# Patient Record
Sex: FEMALE | Race: WHITE | Employment: PART TIME | URBAN - METROPOLITAN AREA
[De-identification: names, ages, dates, MRNs, and addresses within clinical notes are randomized per-mention and may not be internally consistent; named-entity substitution may affect disease eponyms.]

---

## 2017-01-10 ENCOUNTER — HOSPITAL ENCOUNTER (OUTPATIENT)
Dept: CT IMAGING | Age: 32
Discharge: HOME | End: 2017-01-10
Attending: OBSTETRICS & GYNECOLOGY
Payer: COMMERCIAL

## 2017-01-10 DIAGNOSIS — R10.2 PELVIC PAIN: ICD-10-CM

## 2017-01-10 PROCEDURE — 74011000258 HC RX REV CODE- 258: Performed by: OBSTETRICS & GYNECOLOGY

## 2017-01-10 PROCEDURE — 74011636320 HC RX REV CODE- 636/320: Performed by: OBSTETRICS & GYNECOLOGY

## 2017-01-10 PROCEDURE — 74177 CT ABD & PELVIS W/CONTRAST: CPT

## 2017-01-10 RX ORDER — SODIUM CHLORIDE 0.9 % (FLUSH) 0.9 %
10 SYRINGE (ML) INJECTION
Status: COMPLETED | OUTPATIENT
Start: 2017-01-10 | End: 2017-01-10

## 2017-01-10 RX ADMIN — IOVERSOL 100 ML: 741 INJECTION INTRA-ARTERIAL; INTRAVENOUS at 10:05

## 2017-01-10 RX ADMIN — SODIUM CHLORIDE 100 ML: 900 INJECTION, SOLUTION INTRAVENOUS at 10:05

## 2017-01-10 RX ADMIN — Medication 10 ML: at 10:05

## 2017-01-10 RX ADMIN — DIATRIZOATE MEGLUMINE AND DIATRIZOATE SODIUM 15 ML: 660; 100 LIQUID ORAL; RECTAL at 10:05

## 2017-05-23 ENCOUNTER — HOSPITAL ENCOUNTER (OUTPATIENT)
Dept: LAB | Age: 32
Discharge: HOME | End: 2017-05-23
Attending: PHYSICAL MEDICINE & REHABILITATION
Payer: COMMERCIAL

## 2017-05-23 LAB
CRP SERPL-MCNC: <0.2 MG/DL (ref 0–0.9)
ERYTHROCYTE [SEDIMENTATION RATE] IN BLOOD: 6 MM/HR (ref 0–20)
URATE SERPL-MCNC: 3.5 MG/DL (ref 2.6–6)

## 2017-05-23 PROCEDURE — 86140 C-REACTIVE PROTEIN: CPT

## 2017-05-23 PROCEDURE — 84550 ASSAY OF BLOOD/URIC ACID: CPT

## 2017-05-23 PROCEDURE — 86060 ANTISTREPTOLYSIN O TITER: CPT

## 2017-05-23 PROCEDURE — 36415 COLL VENOUS BLD VENIPUNCTURE: CPT

## 2017-05-23 PROCEDURE — 85652 RBC SED RATE AUTOMATED: CPT

## 2017-05-23 PROCEDURE — 82306 VITAMIN D 25 HYDROXY: CPT

## 2017-05-23 PROCEDURE — 86038 ANTINUCLEAR ANTIBODIES: CPT

## 2017-05-23 PROCEDURE — 86430 RHEUMATOID FACTOR TEST QUAL: CPT

## 2017-05-23 PROCEDURE — 82652 VIT D 1 25-DIHYDROXY: CPT

## 2017-05-24 LAB
1,25(OH)2D3 SERPL-MCNC: 84.8 PG/ML (ref 19.9–79.3)
25(OH)D3+25(OH)D2 SERPL-MCNC: 36.5 NG/ML (ref 30–100)
ANA SER QL: NEGATIVE
ASO AB SERPL-ACNC: 84 IU/ML (ref 0–200)
RHEUMATOID FACT SER QL LA: NEGATIVE

## 2017-05-27 LAB
25(OH)D2 SERPL-MCNC: 3.2 NG/ML
25(OH)D3 SERPL-MCNC: 35 NG/ML
25(OH)D3+25(OH)D2 SERPL-MCNC: 38 NG/ML

## 2017-11-07 ENCOUNTER — HOSPITAL ENCOUNTER (OUTPATIENT)
Dept: PHYSICAL THERAPY | Age: 32
Discharge: HOME | End: 2017-11-07
Payer: COMMERCIAL

## 2017-11-07 PROCEDURE — 97140 MANUAL THERAPY 1/> REGIONS: CPT

## 2017-11-07 PROCEDURE — 97162 PT EVAL MOD COMPLEX 30 MIN: CPT

## 2017-11-07 NOTE — PROGRESS NOTES
Ambulatory/Rehab Services H2 Model Falls Risk Assessment    Risk Factor Pts. ·   Confusion/Disorientation/Impulsivity  []    4 ·   Symptomatic Depression  []   2 ·   Altered Elimination  []   1 ·   Dizziness/Vertigo  []   1 ·   Gender (Male)  []   1 ·   Any administered antiepileptics (anticonvulsants):  []   2 ·   Any administered benzodiazepines:  []   1 ·   Visual Impairment (specify):  []   1 ·   Portable Oxygen Use  []   1 ·   Orthostatic ? BP  []   1 ·   History of Recent Falls (within 3 mos.)  []   5     Ability to Rise from Chair (choose one) Pts. ·   Ability to rise in a single movement  [x]   0 ·   Pushes up, successful in one attempt  []   1 ·   Multiple attempts, but successful  []   3 ·   Unable to rise without assistance  []   4   Total: (5 or greater = High Risk) 0     Falls Prevention Plan:   []                Physical Limitations to Exercise (specify):   []                Mobility Assistance Device (type):   []                Exercise/Equipment Adaptation (specify):    ©2010 [facility] Patent #[id number].  Federal Law prohibits the replication, distribution or use without written permission from [facility] [employer]

## 2017-11-07 NOTE — THERAPY EVALUATION
Aisha Joiner Bob  : 1985  Payor: Shannen Hwang / [place] / Product Type: PPO /  [address]  [address]  [phone]   [phone]          OUTPATIENT PHYSICAL THERAPY:Initial Assessment and Daily Note 2017    ICD-10: Treatment Diagnosis: Chronic pelvic pain (R10.2); spasm of muscle (M62.838); Pelvic floor dysfunction (M62.89)  Precautions/Allergies:   Codeine and Dermagraft [cult skin subst, human-bovine]   Fall Risk Score: 0 (? 5 = High Risk)  MD Orders: Evaluate and treat MEDICAL/REFERRING DIAGNOSIS:  Pelvic floor dysfunction [M62.89]   DATE OF ONSET: Chronic endometriosis since menstruation  REFERRING PHYSICIAN: Jeannette Covarrubias, *  RETURN PHYSICIAN APPOINTMENT: TBD     INITIAL ASSESSMENT:  Ms. Henok Johnson presents with significant pelvic floor muscle (PFM) over activity and lack of coordination, as well as connective tissue restrictions and trigger points in her adductors, abdominal wall and posterior chain; this results in chronic pelvic pain. She also demonstrates weakness throughout her core musculature and pelvic girdle. This is contributing to her difficulty with all ADL's and physical activity. I believe she will benefit from skilled PT, with an emphasis on manual therapy and muscle retraining, strength, and coordination to improve her ability to perform housework, , work responsibilities and physical activity without pain or difficulty. She is pleasant, motivated and eager to return to her prior level of function. Progress may be imited, however, due to the chronic and severe nature of her pain. PROBLEM LIST (Impacting functional limitations):  1. Decreased Strength  2. Decreased ADL/Functional Activities  3. Increased Pain  4. Decreased Activity Tolerance INTERVENTIONS PLANNED:  1. Electrical Stimulation  2. Home Exercise Program (HEP)  3. Therapeutic Activites  4.  Therapeutic Exercise/Strengthening   TREATMENT PLAN:  Effective Dates: 11/7/2017 TO 1/30/2018. Frequency/Duration: Once every two weeks for 12 weeks. GOALS: (Goals have been discussed and agreed upon with patient.)  Short-Term Functional Goals: Time Frame: 6 weeks  1. Pt will demonstrate N voluntary relaxation of PFM to improve her ability to urinate without hesitancy or incomplete emptying. 2. Pt will demonstrate I with basic PFM HEP to improve resting tone and decrease pain with prolonged sitting. Discharge Goals: Time Frame: 12 weeks  1. Pt will reports < 2/10 pain with 1 finger palpation of PFM and obturator internus (OI); to initiate pain free intercourse with spouse. 2. Pt will demonstrate appropriate co contraction of the Transversus Abdominus and Pelvic Floor muscle group (without excessive compensation), and maintain x 60 seconds to improve core stability and return to dynamic physical activity such as running and jumping. 3. Pt will tolerate dilator training with < 3/10 pain to initiate at home and improve tenderness in PFM to return to pain free sexual activity with spouse. Rehabilitation Potential For Stated Goals: Good                The information in this section was collected on 11/7/2017 (except where otherwise noted). HISTORY:   History of Present Injury/Illness (Reason for Referral):  Pt with a history of chronic endometriosis. This has resulted in > 10 surgeries to remove, including hysterectomy (has ovaries) and excision in November 2016. Pain comes and goes, but is always present. Recent worsening of pain in beginning of November. Urinary: Complains of hesitancy and incomplete emptying. Takes ~ 15-30 seconds to start stream. Denies UI or dysuria at this time,  Bowel: History of constipation managed with OTC medications. However, recently has been going daily with push/strain. Sexual: Sex has always been painful.  Currently, pain during is 6/10 (diffuse in the vagina) and 8/10 after (throughout the pelvic girdle). 10/10 if he \"hits my cuff\". Pelvic Organ Prolapse/Pelvic Pain: Daily pelvic pain reported in L LQ. Sharp, shooting down anterior thigh on L. Can radiate up to ribs. Best pain is 6/19o while on pain medication, worst 10/10. Nothing seems to make pain better other than meds, prolonged sitting or standing, as well as ADL's increase pain. Past Medical History/Comorbidities:   Ms. Aakash Washington  has a past medical history of Anxiety (9/6/2013); Endometriosis; Gluten intolerance; Migraine; Nausea & vomiting; Ovarian cyst; PUD (peptic ulcer disease) (12/2013 ); Unspecified adverse effect of anesthesia; UTI (urinary tract infection); and Vaginal cuff dehiscence. Ms. Aakash Washington  has a past surgical history that includes breast augmentation (2003); gyn (2000, 2004); hysteroscopy; dilation and curettage; endometrial cryoablation; pelvic laparoscopy (11/03/2016); other surgical; bilateral salpingectomy; other surgical (11/2016); and total laparoscopic hysterectomy. Social History/Living Environment:     Pt lives in [address] [place] [address] home with spouse and 3 children  Prior Level of Function/Work/Activity:  Works part time at [employer]    Current Medications:       Current Outpatient Prescriptions:     carisoprodol (SOMA) 250 mg tablet, Take 250 mg by mouth four (4) times daily. , Disp: , Rfl:     minocycline (MINOCIN, DYNACIN) 100 mg capsule, Take  by mouth., Disp: , Rfl:     traMADol (ULTRAM) 50 mg tablet, Take 1-2 Tabs by mouth every six (6) hours as needed for Pain. Max Daily Amount: 400 mg., Disp: 60 Tab, Rfl: 0    clonazePAM (KLONOPIN) 0.5 mg tablet, Take  by mouth nightly as needed. , Disp: , Rfl:     HYDROcodone-acetaminophen (NORCO) 7.5-325 mg per tablet, Take 1 Tab by mouth every six (6) hours as needed for Pain. Max Daily Amount: 4 Tabs., Disp: 60 Tab, Rfl: 0    amitriptyline (ELAVIL) 25 mg tablet, Take 1 Tab by mouth nightly.  Indications: NEUROPATHIC PAIN, Disp: 30 Tab, Rfl: 11    gabapentin (NEURONTIN) 100 mg capsule, TAKE THREE CAPSULES BY MOUTH EVERY DAY, Disp: 90 Cap, Rfl: 6    citalopram (CELEXA) 10 mg tablet, Take 20 mg by mouth daily. , Disp: , Rfl:     cyclobenzaprine (FLEXERIL) 5 mg tablet, Take 5 mg by mouth as needed for Muscle Spasm(s). , Disp: , Rfl:     promethazine (PHENERGAN) 25 mg tablet, Take 25 mg by mouth every six (6) hours as needed for Nausea., Disp: , Rfl:     docusate sodium (COLACE) 50 mg capsule, Take 50 mg by mouth three (3) times daily. , Disp: , Rfl:     ibuprofen (MOTRIN) 800 mg tablet, Take 1 Tab by mouth every six (6) hours as needed. , Disp: 30 Tab, Rfl: 0    magnesium oxide (MAG-OX) 400 mg tablet, Take 400 mg by mouth daily. , Disp: , Rfl:     melatonin 3 mg tablet, Take 5 mg by mouth nightly., Disp: , Rfl:     loratadine (CLARITIN) 10 mg tablet, Take 10 mg by mouth every evening., Disp: , Rfl:     LACTOBACILLUS ACIDOPHILUS (PROBIOTIC PO), Take  by mouth., Disp: , Rfl:     multivitamin (ONE A DAY) tablet, Take 1 Tab by mouth daily. , Disp: , Rfl:    Date Last Reviewed:  11/7/2017     Number of Personal Factors/Comorbidities that affect the Plan of Care: 1-2: MODERATE COMPLEXITY   EXAMINATION:   Palpation:          Adductors: Moderate connective tissue (CT) restrictions B, L>R with pain and wincing. Pudendal nerve glides significantly tender to palpation (TTP). Thoracic Spine: T10-12 refers pain towards vaginal cuff, however mobility is WNL. ROM:          Pelvic mobility: WNL and painfree. Lumbar Mobility: WNL, and painfree. Strength: Of PFM via SEMG: Resting tone generally 10-15 mV. Superficial PFM via vaginal canal: Power at least 3/5 with moderate delay in relaxation. Transverse Abdominus (TA): Good with strong PFM co contraction       Body Structures Involved:  1. Muscles Body Functions Affected:  1. Reproductive  2. Neuromusculoskeletal  3. Movement Related Activities and Participation Affected:  1.  General Tasks and Demands  2. Mobility  3. Self Care  4. Interpersonal Interactions and Relationships   Number of elements (examined above) that affect the Plan of Care: 3: MODERATE COMPLEXITY   CLINICAL PRESENTATION:   Presentation: Evolving clinical presentation with changing clinical characteristics: MODERATE COMPLEXITY   CLINICAL DECISION MAKING:   Outcome Measure: Tool Used: Pain Disability Index  Score:  Initial: 11/7/2017; 42 Most Recent: TBD   Interpretation of Score: The rating scale measures the degree to which aspects of your life are disrupted by chronic pain. For each of the 7 categories, patient circles the level of disability they experience 0 to 10. 0 signifies no disability at all, 10 signifies that these activities are totally disrupted by pain. Medical Necessity:   · Patient is expected to demonstrate progress in range of motion, coordination and functional technique to return to painfree ADL's, work activities and recreation. Reason for Services/Other Comments:  · Patient continues to require skilled intervention due to above mentioned deficits. Use of outcome tool(s) and clinical judgement create a POC that gives a: Questionable prediction of patient's progress: MODERATE COMPLEXITY            TREATMENT:   (In addition to Assessment/Re-Assessment sessions the following treatments were rendered)  Pre-treatment Symptoms/Complaints: This is the worst flare she has been in in years, and the pain feels different. Has an MRI on the 13th. Pain: Initial: Pain Intensity 1: 6/10 Post Session:  6/10     THERAPEUTIC EXERCISE: (5 minutes):  Exercises per grid below to improve mobility and coordination. Required moderate verbal and tactile cues to promote proper performance of exercise. Progressed repetitions and complexity of movement as indicated.    Date:  11/7/2017 Date:   Date:     Activity/Exercise Parameters Parameters Parameters   Pelvic Floor Drops 2 minutes     Dilator Education 3 minutes         MANUAL THERAPY: (20 minutes): Joint mobilization and Soft tissue mobilization was utilized and necessary because of the patient's painful spasm. (Used abbreviations: MET - muscle energy technique; SCS- Strain counter strain; CTM- Connective tissue mobilizations; CR- Contract/relax; SP- Sustained pressure). SP utilized to superficial PFM with 1 finger and XS dilator, followed by CTM to B adductors, pudendal nerve glides B, and thoracolumbar PA's and UPA's. Ended session with CTM to thoracolumbar paraspinals and coccyx taping. Trigger point dry needling performed to B adductor sami/longus. Treatment/Session Assessment:    · Response to Treatment:  No abdominal assessment this date due to significant L LQ pain. Initiated down training HEP and MT to pelvic girdle. · Compliance with Program/Exercises: Will assess as treatment progresses. · Recommendations/Intent for next treatment session: \"Next visit will focus on semg, internal, adductors and review HEP\".   Total Treatment Duration: Evaluation: 45 minutes, Treatment 25 minutes  PT Patient Time In/Time Out  Time In: 0900  Time Out: Aydin Higuera 38., PT

## 2017-11-13 ENCOUNTER — HOSPITAL ENCOUNTER (OUTPATIENT)
Dept: MRI IMAGING | Age: 32
Discharge: HOME | End: 2017-11-13
Attending: OBSTETRICS & GYNECOLOGY
Payer: COMMERCIAL

## 2017-11-13 DIAGNOSIS — R10.32 LLQ PAIN: ICD-10-CM

## 2017-11-13 PROCEDURE — A9577 INJ MULTIHANCE: HCPCS | Performed by: OBSTETRICS & GYNECOLOGY

## 2017-11-13 PROCEDURE — 74011250636 HC RX REV CODE- 250/636: Performed by: OBSTETRICS & GYNECOLOGY

## 2017-11-13 PROCEDURE — 72197 MRI PELVIS W/O & W/DYE: CPT

## 2017-11-13 RX ORDER — SODIUM CHLORIDE 0.9 % (FLUSH) 0.9 %
10 SYRINGE (ML) INJECTION
Status: COMPLETED | OUTPATIENT
Start: 2017-11-13 | End: 2017-11-13

## 2017-11-13 RX ADMIN — Medication 10 ML: at 16:20

## 2017-11-13 RX ADMIN — GADOBENATE DIMEGLUMINE 10 ML: 529 INJECTION, SOLUTION INTRAVENOUS at 16:20

## 2017-11-14 NOTE — PROGRESS NOTES
Please notify the patient of normal MRI. Between USG and MRI I don't find anything to explain the pain. Follow up as indicated.   Thanks

## 2017-11-21 ENCOUNTER — HOSPITAL ENCOUNTER (OUTPATIENT)
Dept: PHYSICAL THERAPY | Age: 32
Discharge: HOME | End: 2017-11-21
Payer: COMMERCIAL

## 2017-11-21 DIAGNOSIS — R10.2 PELVIC PAIN: ICD-10-CM

## 2017-11-21 PROCEDURE — 97140 MANUAL THERAPY 1/> REGIONS: CPT

## 2017-11-21 PROCEDURE — 97110 THERAPEUTIC EXERCISES: CPT

## 2017-12-08 ENCOUNTER — HOSPITAL ENCOUNTER (OUTPATIENT)
Dept: PHYSICAL THERAPY | Age: 32
Discharge: HOME | End: 2017-12-08
Payer: COMMERCIAL

## 2017-12-08 PROCEDURE — 97110 THERAPEUTIC EXERCISES: CPT

## 2017-12-08 PROCEDURE — 97140 MANUAL THERAPY 1/> REGIONS: CPT

## 2017-12-08 NOTE — PROGRESS NOTES
Giorgio Rojas  : 1985  Payor: Yoli Singh / [place] / Product Type: PPO /  [address]  at [address]  [phone]   [phone]          OUTPATIENT PHYSICAL THERAPY:Daily Note 2017    ICD-10: Treatment Diagnosis: Chronic pelvic pain (R10.2); spasm of muscle (M62.838); Pelvic floor dysfunction (M62.89)  Precautions/Allergies:   Codeine and Dermagraft [cult skin subst, human-bovine]   Fall Risk Score: 0 (? 5 = High Risk)  MD Orders: Evaluate and treat MEDICAL/REFERRING DIAGNOSIS:  Pelvic floor dysfunction [M62.89]   DATE OF ONSET: Chronic endometriosis since menstruation  REFERRING PHYSICIAN: Radha Contreras, *  RETURN PHYSICIAN APPOINTMENT: TBD     INITIAL ASSESSMENT:  Ms. Mathieu Díaz presents with significant pelvic floor muscle (PFM) over activity and lack of coordination, as well as connective tissue restrictions and trigger points in her adductors, abdominal wall and posterior chain; this results in chronic pelvic pain. She also demonstrates weakness throughout her core musculature and pelvic girdle. This is contributing to her difficulty with all ADL's and physical activity. I believe she will benefit from skilled PT, with an emphasis on manual therapy and muscle retraining, strength, and coordination to improve her ability to perform housework, , work responsibilities and physical activity without pain or difficulty. She is pleasant, motivated and eager to return to her prior level of function. Progress may be imited, however, due to the chronic and severe nature of her pain. PROBLEM LIST (Impacting functional limitations):  1. Decreased Strength  2. Decreased ADL/Functional Activities  3. Increased Pain  4. Decreased Activity Tolerance INTERVENTIONS PLANNED:  1. Electrical Stimulation  2. Home Exercise Program (HEP)  3. Therapeutic Activites  4.  Therapeutic Exercise/Strengthening TREATMENT PLAN:  Effective Dates: 11/7/2017 TO 1/30/2018. Frequency/Duration: Once every two weeks for 12 weeks. GOALS: (Goals have been discussed and agreed upon with patient.)  Short-Term Functional Goals: Time Frame: 6 weeks  1. Pt will demonstrate N voluntary relaxation of PFM to improve her ability to urinate without hesitancy or incomplete emptying. 2. Pt will demonstrate I with basic PFM HEP to improve resting tone and decrease pain with prolonged sitting. Discharge Goals: Time Frame: 12 weeks  1. Pt will reports < 2/10 pain with 1 finger palpation of PFM and obturator internus (OI); to initiate pain free intercourse with spouse. 2. Pt will demonstrate appropriate co contraction of the Transversus Abdominus and Pelvic Floor muscle group (without excessive compensation), and maintain x 60 seconds to improve core stability and return to dynamic physical activity such as running and jumping. 3. Pt will tolerate dilator training with < 3/10 pain to initiate at home and improve tenderness in PFM to return to pain free sexual activity with spouse. Rehabilitation Potential For Stated Goals: Good                The information in this section was collected on 11/7/2017 (except where otherwise noted). HISTORY:   History of Present Injury/Illness (Reason for Referral):  Pt with a history of chronic endometriosis. This has resulted in > 10 surgeries to remove, including hysterectomy (has ovaries) and excision in November 2016. Pain comes and goes, but is always present. Recent worsening of pain in beginning of November. Urinary: Complains of hesitancy and incomplete emptying. Takes ~ 15-30 seconds to start stream. Denies UI or dysuria at this time,  Bowel: History of constipation managed with OTC medications. However, recently has been going daily with push/strain. Sexual: Sex has always been painful. Currently, pain during is 6/10 (diffuse in the vagina) and 8/10 after (throughout the pelvic girdle).  10/10 if he \"hits my cuff\". Pelvic Organ Prolapse/Pelvic Pain: Daily pelvic pain reported in L LQ. Sharp, shooting down anterior thigh on L. Can radiate up to ribs. Best pain is 6/19o while on pain medication, worst 10/10. Nothing seems to make pain better other than meds, prolonged sitting or standing, as well as ADL's increase pain. Past Medical History/Comorbidities:   Ms. Sedrick Batista  has a past medical history of Anxiety (9/6/2013); Endometriosis; Gluten intolerance; Migraine; Nausea & vomiting; Ovarian cyst; PUD (peptic ulcer disease) (12/2013 ); Unspecified adverse effect of anesthesia; UTI (urinary tract infection); and Vaginal cuff dehiscence. Ms. Sedrick Batista  has a past surgical history that includes breast augmentation (2003); gyn (2000, 2004); hysteroscopy; dilation and curettage; endometrial cryoablation; pelvic laparoscopy (11/03/2016); other surgical; bilateral salpingectomy; other surgical (11/2016); and total laparoscopic hysterectomy. Social History/Living Environment:     Pt lives in [address] [place] home with spouse and 3 children  Prior Level of Function/Work/Activity:  Works part time at [employer]    Current Medications:       Current Outpatient Prescriptions:     carisoprodol (SOMA) 250 mg tablet, Take 250 mg by mouth four (4) times daily. , Disp: , Rfl:     minocycline (MINOCIN, DYNACIN) 100 mg capsule, Take  by mouth., Disp: , Rfl:     traMADol (ULTRAM) 50 mg tablet, Take 1-2 Tabs by mouth every six (6) hours as needed for Pain. Max Daily Amount: 400 mg., Disp: 60 Tab, Rfl: 0    clonazePAM (KLONOPIN) 0.5 mg tablet, Take  by mouth nightly as needed. , Disp: , Rfl:     HYDROcodone-acetaminophen (NORCO) 7.5-325 mg per tablet, Take 1 Tab by mouth every six (6) hours as needed for Pain. Max Daily Amount: 4 Tabs., Disp: 60 Tab, Rfl: 0    amitriptyline (ELAVIL) 25 mg tablet, Take 1 Tab by mouth nightly.  Indications: NEUROPATHIC PAIN, Disp: 30 Tab, Rfl: 11    gabapentin (NEURONTIN) 100 mg capsule, TAKE THREE CAPSULES BY MOUTH EVERY DAY, Disp: 90 Cap, Rfl: 6    citalopram (CELEXA) 10 mg tablet, Take 20 mg by mouth daily. , Disp: , Rfl:     cyclobenzaprine (FLEXERIL) 5 mg tablet, Take 5 mg by mouth as needed for Muscle Spasm(s). , Disp: , Rfl:     promethazine (PHENERGAN) 25 mg tablet, Take 25 mg by mouth every six (6) hours as needed for Nausea., Disp: , Rfl:     docusate sodium (COLACE) 50 mg capsule, Take 50 mg by mouth three (3) times daily. , Disp: , Rfl:     ibuprofen (MOTRIN) 800 mg tablet, Take 1 Tab by mouth every six (6) hours as needed. , Disp: 30 Tab, Rfl: 0    magnesium oxide (MAG-OX) 400 mg tablet, Take 400 mg by mouth daily. , Disp: , Rfl:     melatonin 3 mg tablet, Take 5 mg by mouth nightly., Disp: , Rfl:     loratadine (CLARITIN) 10 mg tablet, Take 10 mg by mouth every evening., Disp: , Rfl:     LACTOBACILLUS ACIDOPHILUS (PROBIOTIC PO), Take  by mouth., Disp: , Rfl:     multivitamin (ONE A DAY) tablet, Take 1 Tab by mouth daily. , Disp: , Rfl:    Date Last Reviewed:  12/8/2017     Number of Personal Factors/Comorbidities that affect the Plan of Care: 1-2: MODERATE COMPLEXITY   EXAMINATION:   Palpation:          Adductors: Moderate connective tissue (CT) restrictions B, L>R with pain and wincing. Pudendal nerve glides significantly tender to palpation (TTP). Thoracic Spine: T10-12 refers pain towards vaginal cuff, however mobility is WNL. ROM:          Pelvic mobility: WNL and painfree. Lumbar Mobility: WNL, and painfree. Strength: Of PFM via SEMG: Resting tone generally 10-15 mV. Superficial PFM via vaginal canal: Power at least 3/5 with moderate delay in relaxation. Transverse Abdominus (TA): Good with strong PFM co contraction       Body Structures Involved:  1. Muscles Body Functions Affected:  1. Reproductive  2. Neuromusculoskeletal  3. Movement Related Activities and Participation Affected:  1. General Tasks and Demands  2. Mobility  3.  Self Care  4. Interpersonal Interactions and Relationships   Number of elements (examined above) that affect the Plan of Care: 3: MODERATE COMPLEXITY   CLINICAL PRESENTATION:   Presentation: Evolving clinical presentation with changing clinical characteristics: MODERATE COMPLEXITY   CLINICAL DECISION MAKING:   Outcome Measure: Tool Used: Pain Disability Index  Score:  Initial: 11/7/2017; 42 Most Recent: TBD   Interpretation of Score: The rating scale measures the degree to which aspects of your life are disrupted by chronic pain. For each of the 7 categories, patient circles the level of disability they experience 0 to 10. 0 signifies no disability at all, 10 signifies that these activities are totally disrupted by pain. Medical Necessity:   · Patient is expected to demonstrate progress in range of motion, coordination and functional technique to return to painfree ADL's, work activities and recreation. Reason for Services/Other Comments:  · Patient continues to require skilled intervention due to above mentioned deficits. Use of outcome tool(s) and clinical judgement create a POC that gives a: Questionable prediction of patient's progress: MODERATE COMPLEXITY            TREATMENT:   (In addition to Assessment/Re-Assessment sessions the following treatments were rendered)    Pre-treatment Symptoms/Complaints:  Currently, feels like pelvic pain is about the same. Traveled to the [facility] for 10 days, with increase in pain with prolonged travel (as well as return of tailbone pain). Did have intercourse with spouse when she returned, and again very painful, requiring medication. Currently, pain is primarily in the L LQ and can radiate down into the thigh. Pain: Initial: Pain Intensity 1: 6/10 Post Session:  5/10 (but very sore)     THERAPEUTIC EXERCISE: (10 minutes):  Exercises per grid below to improve mobility and coordination.   Required moderate verbal and tactile cues to promote proper performance of exercise. Progressed repetitions and complexity of movement as indicated. Date:  12/8/2017     Activity/Exercise Parameters   Pelvic Floor Drops Supine; 5 minutes   Adductor stretch Performed B. Supine frog and DKTC; 4 minutes   Home Exercise Program Drops, DKTC/Adductor Stretch, Pelvic Mobility, Dilators; 1 minutes       MANUAL THERAPY: (50 minutes): Joint mobilization and Soft tissue mobilization was utilized and necessary because of the patient's painful spasm. (Used abbreviations: MET - muscle energy technique; SCS- Strain counter strain; CTM- Connective tissue mobilizations; CR- Contract/relax; SP- Sustained pressure). - SP utilized to superficial and deep PFM, as well as OI with 1 finger, followed by CTM and IASTM to B adductors, pudendal nerve glides B, gentle iliacus release, and external PFM release. Ended session with trigger point dry needling performed to B adductor sami/longus and iliac clearing B. Treatment/Session Assessment:    · Response to Treatment:  Continues to report significant pain with palpation of PFM, unable to access OI due to pain today. As well as pain, with complaints of nausea, with gentle iliac clearing. She left PT with soreness and continued pain, but generally states that relief comes the following day. Instructed to continue down training and attempt to use dilators this week to improve PFM ROM and decrease daily pain. · Compliance with Program/Exercises: Compliant with downtraining at this time. · Recommendations/Intent for next treatment session: \"Next visit will focus on semg, internal, ischiorectal fossa, thoracic spine mobility\".     Total Treatment Duration: 60 minutes; 5 min regla/doff clothing  PT Patient Time In/Time Out  Time In: 1230  Time Out: 1335    Erna Cunha PT

## 2017-12-19 ENCOUNTER — HOSPITAL ENCOUNTER (OUTPATIENT)
Dept: PHYSICAL THERAPY | Age: 32
Discharge: HOME | End: 2017-12-19
Payer: COMMERCIAL

## 2017-12-19 PROCEDURE — 97110 THERAPEUTIC EXERCISES: CPT

## 2017-12-19 PROCEDURE — 97140 MANUAL THERAPY 1/> REGIONS: CPT

## 2017-12-19 NOTE — PROGRESS NOTES
Le Rojas  : 1985  Payor: Rudy Yuan / Plan[place] / Product Type: PPO /  [address]  at [address]  [phone]   [phone]          OUTPATIENT PHYSICAL THERAPY:Daily Note 2017    ICD-10: Treatment Diagnosis: Chronic pelvic pain (R10.2); spasm of muscle (M62.838); Pelvic floor dysfunction (M62.89)  Precautions/Allergies:   Codeine and Dermagraft [cult skin subst, human-bovine]   Fall Risk Score: 0 (? 5 = High Risk)  MD Orders: Evaluate and treat MEDICAL/REFERRING DIAGNOSIS:  Pelvic floor dysfunction [M62.89]   DATE OF ONSET: Chronic endometriosis since menstruation  REFERRING PHYSICIAN: Kathrin Chaudhari, *  RETURN PHYSICIAN APPOINTMENT: TBD     INITIAL ASSESSMENT:  Ms. Shiv Antony presents with significant pelvic floor muscle (PFM) over activity and lack of coordination, as well as connective tissue restrictions and trigger points in her adductors, abdominal wall and posterior chain; this results in chronic pelvic pain. She also demonstrates weakness throughout her core musculature and pelvic girdle. This is contributing to her difficulty with all ADL's and physical activity. I believe she will benefit from skilled PT, with an emphasis on manual therapy and muscle retraining, strength, and coordination to improve her ability to perform housework, , work responsibilities and physical activity without pain or difficulty. She is pleasant, motivated and eager to return to her prior level of function. Progress may be imited, however, due to the chronic and severe nature of her pain. PROBLEM LIST (Impacting functional limitations):  1. Decreased Strength  2. Decreased ADL/Functional Activities  3. Increased Pain  4. Decreased Activity Tolerance INTERVENTIONS PLANNED:  1. Electrical Stimulation  2. Home Exercise Program (HEP)  3. Therapeutic Activites  4.  Therapeutic Exercise/Strengthening TREATMENT PLAN:  Effective Dates: 11/7/2017 TO 1/30/2018. Frequency/Duration: Once every two weeks for 12 weeks. GOALS: (Goals have been discussed and agreed upon with patient.)  Short-Term Functional Goals: Time Frame: 6 weeks  1. Pt will demonstrate N voluntary relaxation of PFM to improve her ability to urinate without hesitancy or incomplete emptying. 2. Pt will demonstrate I with basic PFM HEP to improve resting tone and decrease pain with prolonged sitting. Discharge Goals: Time Frame: 12 weeks  1. Pt will reports < 2/10 pain with 1 finger palpation of PFM and obturator internus (OI); to initiate pain free intercourse with spouse. 2. Pt will demonstrate appropriate co contraction of the Transversus Abdominus and Pelvic Floor muscle group (without excessive compensation), and maintain x 60 seconds to improve core stability and return to dynamic physical activity such as running and jumping. 3. Pt will tolerate dilator training with < 3/10 pain to initiate at home and improve tenderness in PFM to return to pain free sexual activity with spouse. Rehabilitation Potential For Stated Goals: Good                The information in this section was collected on 11/7/2017 (except where otherwise noted). HISTORY:   History of Present Injury/Illness (Reason for Referral):  Pt with a history of chronic endometriosis. This has resulted in > 10 surgeries to remove, including hysterectomy (has ovaries) and excision in November 2016. Pain comes and goes, but is always present. Recent worsening of pain in beginning of November. Urinary: Complains of hesitancy and incomplete emptying. Takes ~ 15-30 seconds to start stream. Denies UI or dysuria at this time,  Bowel: History of constipation managed with OTC medications. However, recently has been going daily with push/strain. Sexual: Sex has always been painful. Currently, pain during is 6/10 (diffuse in the vagina) and 8/10 after (throughout the pelvic girdle).  10/10 if he \"hits my cuff\". Pelvic Organ Prolapse/Pelvic Pain: Daily pelvic pain reported in L LQ. Sharp, shooting down anterior thigh on L. Can radiate up to ribs. Best pain is 6/19o while on pain medication, worst 10/10. Nothing seems to make pain better other than meds, prolonged sitting or standing, as well as ADL's increase pain. Past Medical History/Comorbidities:   Ms. Alexander Salas  has a past medical history of Anxiety (9/6/2013); Endometriosis; Gluten intolerance; Migraine; Nausea & vomiting; Ovarian cyst; PUD (peptic ulcer disease) (12/2013 ); Unspecified adverse effect of anesthesia; UTI (urinary tract infection); and Vaginal cuff dehiscence. Ms. Alexander Salas  has a past surgical history that includes breast augmentation (2003); gyn (2000, 2004); hysteroscopy; dilation and curettage; endometrial cryoablation; pelvic laparoscopy (11/03/2016); other surgical; bilateral salpingectomy; other surgical (11/2016); and total laparoscopic hysterectomy. Social History/Living Environment:     Pt lives in [address] [place] [address] home with spouse and 3 children  Prior Level of Function/Work/Activity:  Works part time at [employer]    Current Medications:       Current Outpatient Prescriptions:     carisoprodol (SOMA) 250 mg tablet, Take 250 mg by mouth four (4) times daily. , Disp: , Rfl:     minocycline (MINOCIN, DYNACIN) 100 mg capsule, Take  by mouth., Disp: , Rfl:     traMADol (ULTRAM) 50 mg tablet, Take 1-2 Tabs by mouth every six (6) hours as needed for Pain. Max Daily Amount: 400 mg., Disp: 60 Tab, Rfl: 0    clonazePAM (KLONOPIN) 0.5 mg tablet, Take  by mouth nightly as needed. , Disp: , Rfl:     HYDROcodone-acetaminophen (NORCO) 7.5-325 mg per tablet, Take 1 Tab by mouth every six (6) hours as needed for Pain. Max Daily Amount: 4 Tabs., Disp: 60 Tab, Rfl: 0    amitriptyline (ELAVIL) 25 mg tablet, Take 1 Tab by mouth nightly.  Indications: NEUROPATHIC PAIN, Disp: 30 Tab, Rfl: 11    gabapentin (NEURONTIN) 100 mg capsule, TAKE THREE CAPSULES BY MOUTH EVERY DAY, Disp: 90 Cap, Rfl: 6    citalopram (CELEXA) 10 mg tablet, Take 20 mg by mouth daily. , Disp: , Rfl:     cyclobenzaprine (FLEXERIL) 5 mg tablet, Take 5 mg by mouth as needed for Muscle Spasm(s). , Disp: , Rfl:     promethazine (PHENERGAN) 25 mg tablet, Take 25 mg by mouth every six (6) hours as needed for Nausea., Disp: , Rfl:     docusate sodium (COLACE) 50 mg capsule, Take 50 mg by mouth three (3) times daily. , Disp: , Rfl:     ibuprofen (MOTRIN) 800 mg tablet, Take 1 Tab by mouth every six (6) hours as needed. , Disp: 30 Tab, Rfl: 0    magnesium oxide (MAG-OX) 400 mg tablet, Take 400 mg by mouth daily. , Disp: , Rfl:     melatonin 3 mg tablet, Take 5 mg by mouth nightly., Disp: , Rfl:     loratadine (CLARITIN) 10 mg tablet, Take 10 mg by mouth every evening., Disp: , Rfl:     LACTOBACILLUS ACIDOPHILUS (PROBIOTIC PO), Take  by mouth., Disp: , Rfl:     multivitamin (ONE A DAY) tablet, Take 1 Tab by mouth daily. , Disp: , Rfl:    Date Last Reviewed:  12/19/2017     Number of Personal Factors/Comorbidities that affect the Plan of Care: 1-2: MODERATE COMPLEXITY   EXAMINATION:   Palpation:          Adductors: Moderate connective tissue (CT) restrictions B, L>R with pain and wincing. Pudendal nerve glides significantly tender to palpation (TTP). Thoracic Spine: T10-12 refers pain towards vaginal cuff, however mobility is WNL. ROM:          Pelvic mobility: WNL and painfree. Lumbar Mobility: WNL, and painfree. Strength: Of PFM via SEMG: Resting tone generally 10-15 mV. Superficial PFM via vaginal canal: Power at least 3/5 with moderate delay in relaxation. Transverse Abdominus (TA): Good with strong PFM co contraction       Body Structures Involved:  1. Muscles Body Functions Affected:  1. Reproductive  2. Neuromusculoskeletal  3. Movement Related Activities and Participation Affected:  1. General Tasks and Demands  2. Mobility  3.  Self Care  4. Interpersonal Interactions and Relationships   Number of elements (examined above) that affect the Plan of Care: 3: MODERATE COMPLEXITY   CLINICAL PRESENTATION:   Presentation: Evolving clinical presentation with changing clinical characteristics: MODERATE COMPLEXITY   CLINICAL DECISION MAKING:   Outcome Measure: Tool Used: Pain Disability Index  Score:  Initial: 11/7/2017; 42 Most Recent: TBD   Interpretation of Score: The rating scale measures the degree to which aspects of your life are disrupted by chronic pain. For each of the 7 categories, patient circles the level of disability they experience 0 to 10. 0 signifies no disability at all, 10 signifies that these activities are totally disrupted by pain. Medical Necessity:   · Patient is expected to demonstrate progress in range of motion, coordination and functional technique to return to painfree ADL's, work activities and recreation. Reason for Services/Other Comments:  · Patient continues to require skilled intervention due to above mentioned deficits. Use of outcome tool(s) and clinical judgement create a POC that gives a: Questionable prediction of patient's progress: MODERATE COMPLEXITY            TREATMENT:   (In addition to Assessment/Re-Assessment sessions the following treatments were rendered)    Pre-treatment Symptoms/Complaints:  Yesterday was not a good day, significant increase in suprapubic pain. Also, up last night with daughter who was not feeling well so this morning was in 7/10 pain. Now with medication, feels a little better. Pain: Initial: Pain Intensity 1: 7/10 Post Session:  7/10 (very sore)     THERAPEUTIC EXERCISE: (10 minutes):  Exercises per grid below to improve mobility and coordination. Required moderate verbal and tactile cues to promote proper performance of exercise. Progressed repetitions and complexity of movement as indicated.    Date:  12/19/2017     Activity/Exercise Parameters   Pelvic Floor Drops Supine; 5 minutes   Diaphragmatic Breathing 2 minutes   Adductor stretch Performed B. Supine frog; 2 minutes   Home Exercise Program Drops, DKTC/Adductor Stretch, Pelvic Mobility, Dilators; 1 minutes       MANUAL THERAPY: (45 minutes): Joint mobilization and Soft tissue mobilization was utilized and necessary because of the patient's painful spasm. (Used abbreviations: MET - muscle energy technique; SCS- Strain counter strain; CTM- Connective tissue mobilizations; CR- Contract/relax; SP- Sustained pressure). - SP utilized to superficial and deep PFM, as well as OI with 1 finger, followed by CTM and IASTM to B adductors, pudendal nerve glides B, gentle iliacus release, and external PFM release. Ended session with trigger point dry needling performed to B adductor sami/longus and iliac clearing B. Treatment/Session Assessment:    · Response to Treatment:  Did tolerate internal interventions better today, but with continued severe pain reported. Emphasis today on mobilizations throughout the pelvic girdle, as she will undergo a superior hypogastric nerve block in 2 days. Instructed to continue dilators and drops at this time to improve PFM ROM and decrease daily pelvic pain and dyspareunia. · Compliance with Program/Exercises: Compliant with downtraining at this time. · Recommendations/Intent for next treatment session: \"Next visit will focus on semg, internal, ischiorectal fossa, thoracic spine mobility\".     Total Treatment Duration: 55 minutes; 5 min regla/doff clothing  PT Patient Time In/Time Out  Time In: 0805  Time Out: 7111    Dorothy Barakat PT

## 2017-12-21 ENCOUNTER — HOSPITAL ENCOUNTER (OUTPATIENT)
Age: 32
Setting detail: OUTPATIENT SURGERY
Discharge: HOME | End: 2017-12-21
Attending: PHYSICAL MEDICINE & REHABILITATION | Admitting: PHYSICAL MEDICINE & REHABILITATION
Payer: COMMERCIAL

## 2017-12-21 ENCOUNTER — APPOINTMENT (OUTPATIENT)
Dept: GENERAL RADIOLOGY | Age: 32
End: 2017-12-21
Attending: PHYSICAL MEDICINE & REHABILITATION
Payer: COMMERCIAL

## 2017-12-21 ENCOUNTER — APPOINTMENT (OUTPATIENT)
Dept: PHYSICAL THERAPY | Age: 32
End: 2017-12-21
Payer: COMMERCIAL

## 2017-12-21 VITALS
DIASTOLIC BLOOD PRESSURE: 67 MMHG | RESPIRATION RATE: 16 BRPM | WEIGHT: 138 LBS | BODY MASS INDEX: 20.38 KG/M2 | HEART RATE: 79 BPM | TEMPERATURE: 98 F | SYSTOLIC BLOOD PRESSURE: 111 MMHG | OXYGEN SATURATION: 96 %

## 2017-12-21 PROCEDURE — 76010000138 HC OR TIME 0.5 TO 1 HR: Performed by: PHYSICAL MEDICINE & REHABILITATION

## 2017-12-21 PROCEDURE — 77030014125 HC TY EPDRL BBMI -B: Performed by: PHYSICAL MEDICINE & REHABILITATION

## 2017-12-21 PROCEDURE — 76210000026 HC REC RM PH II 1 TO 1.5 HR: Performed by: PHYSICAL MEDICINE & REHABILITATION

## 2017-12-21 PROCEDURE — 74011250636 HC RX REV CODE- 250/636: Performed by: PHYSICAL MEDICINE & REHABILITATION

## 2017-12-21 PROCEDURE — 74011000250 HC RX REV CODE- 250: Performed by: PHYSICAL MEDICINE & REHABILITATION

## 2017-12-21 PROCEDURE — 74011636320 HC RX REV CODE- 636/320: Performed by: PHYSICAL MEDICINE & REHABILITATION

## 2017-12-21 PROCEDURE — 77030003666 HC NDL SPINAL BD -A: Performed by: PHYSICAL MEDICINE & REHABILITATION

## 2017-12-21 RX ORDER — DEXAMETHASONE SODIUM PHOSPHATE 100 MG/10ML
INJECTION INTRAMUSCULAR; INTRAVENOUS AS NEEDED
Status: DISCONTINUED | OUTPATIENT
Start: 2017-12-21 | End: 2017-12-21 | Stop reason: HOSPADM

## 2017-12-21 RX ORDER — LIDOCAINE HYDROCHLORIDE 10 MG/ML
INJECTION INFILTRATION; PERINEURAL AS NEEDED
Status: DISCONTINUED | OUTPATIENT
Start: 2017-12-21 | End: 2017-12-21 | Stop reason: HOSPADM

## 2017-12-21 RX ORDER — FENTANYL CITRATE 50 UG/ML
INJECTION, SOLUTION INTRAMUSCULAR; INTRAVENOUS AS NEEDED
Status: DISCONTINUED | OUTPATIENT
Start: 2017-12-21 | End: 2017-12-21 | Stop reason: HOSPADM

## 2017-12-21 RX ORDER — KETOROLAC TROMETHAMINE 30 MG/ML
30 INJECTION, SOLUTION INTRAMUSCULAR; INTRAVENOUS ONCE
Status: COMPLETED | OUTPATIENT
Start: 2017-12-21 | End: 2017-12-21

## 2017-12-21 RX ORDER — ALPRAZOLAM 0.25 MG/1
0.25 TABLET ORAL
COMMUNITY
End: 2018-09-26

## 2017-12-21 RX ADMIN — KETOROLAC TROMETHAMINE 30 MG: 30 INJECTION, SOLUTION INTRAMUSCULAR; INTRAVENOUS at 14:45

## 2017-12-21 NOTE — PROCEDURES
Procedure Note    Patient: Kwasi Michael MRN: [id number]  SSN: [id number]    YOB: 1985  Age: 28 y.o. Sex: female      Date of Procedure: 12/21/2017     Pre-procedure Diagnosis:  Chronic pelvic pain                                                Sympathetically maintained pain    Post-procedure Diagnosis: Chronic pelvic pain                                                Sympathetically maintained pain    Procedure: Lumbar sympathetic block, bilateral (superior hypogastric plexus)    Performed By: David Escobedo MD     Anesthesia: Local;                          Fentanyl 25 mcg administered IV after completing the left side. Contrast: Omnipaque 300     Findings:  The patient was brought in and placed in a prone position. The patient was prepped and draped in the usual sterile manner. Vital signs were monitored before, during, and after the procedure by the presiding nurse and medical staff. Under fluoroscopic guidance, the T12 level was verified. Counting from T12, the level of interest  was identified. The C-arm was adjusted to flatten the superior endplate at the L5 level. The C-arm was then obliqued to the affected side on the left to align the end of the transverse process with the anterior edge of the vertebral body. The skin overlying the area of interest was marked and, using a 1 inch 25 gauge needle, approximately 1 cc of 1% preservative free lidocaine was injected superficially for local anesthesia. A  5-inch 22 gauge spinal needle was then introduced and slowly advanced under flouroscopic guidance, taking spot films to ensure accuracy. Needle position was then verified with AP and lateral spot films. Upon contacting the vertebral body, the needle was slowly walked anteriorly, keeping contact with the bony surface at all times. Spot films were taken and saved.   Under live fluoroscopy, needle position was verified by injection 0.5-1 cc of contrast through full-length extension tubing, producing flow anterior to the vertebral body. No vascular spread was seen. After checking for negative withdrawal of blood or CSF,  3 cc of 1% preservative free lidocaine and 1 cc of dexamethasone slowly was injected through full-length extension tubing. The needle was then withdrawn. Under fluoroscopic guidance, the T12 level was verified. Counting from T12, the level of interest  was identified. The C-arm was adjusted to flatten the superior endplate at the L5 level. The C-arm was then obliqued to the affected side on the left to align the end of the transverse process with the anterior edge of the vertebral body. The skin overlying the area of interest was marked and, using a 1 inch 25 gauge needle, approximately 1 cc of 1% preservative free lidocaine was injected superficially for local anesthesia. A  5-inch 22 gauge spinal needle was then introduced and slowly advanced under flouroscopic guidance, taking spot films to ensure accuracy. Needle position was then verified with AP and lateral spot films. Upon contacting the vertebral body, the needle was slowly walked anteriorly, keeping contact with the bony surface at all times. Spot films were taken and saved. Under live fluoroscopy, needle position was verified by injection 0.5-1 cc of contrast through full-length extension tubing, producing flow anterior to the vertebral body. No vascular spread was seen. After checking for negative withdrawal of blood or CSF,  3 cc of 1% preservative free lidocaine and 1 cc of dexamethasone slowly was injected through full-length extension tubing. The needle was then withdrawn. The area was then cleaned and bandaged with a sterile dressing. The patient was sent to recovery in good condition. She stated that she had significant low back pain after the procedure and was sensitive to local pressure. Her vital signs were stable. There were no apparent rashes or erythema.  Toradol 30 mg IV was administered. Of note, she has a history of chronic pelvic pain and back pain; she tends to have periods of pain flares after injections. My feeling is that this was more of the same. I discussed this with her at length. I will call her to follow-up. After a period of post-procedure monitoring, the patient received post-procedural instructions and was discharged to home in good condition. Complications: None    Recommendations: Follow-up in 2 weeks.     Signed By: Kirsten Mcgrath MD     December 21, 2017

## 2017-12-21 NOTE — IP AVS SNAPSHOT
[address] 
[phone] Patient: Leon Barraza MRN: [id number] WIO:4[birth year]23 About your hospitalization You were admitted on:  December 21, 2017 You last received care in the:  [place] [address] You were discharged on:  December 21, 2017 Why you were hospitalized Your primary diagnosis was:  Not on File Things You Need To Do (next 8 weeks) Follow up with Rohit Mejia MD  
keep appointment Phone:  [phone] Where:  [facility],[address] [address] Thursday Dec 28, 2017 [facility] with Linford Settler, PT at  8:00 AM  
Where:  [facility] ([address]) Thursday Jan 04, 2018 [facility] with Linford Settler, PT at  9:00 AM  
Where:  [facility] ([address]) Thursday Jan 18, 2018 [facility] with Linford Settler, PT at 12:30 PM  
Where:  [facility] ([address]) Wednesday Jan 31, 2018 [facility] with Linford Settler, PT at 12:30 PM  
Where:  [facility] ([address]) Discharge Orders None A check sobeida indicates which time of day the medication should be taken. My Medications ASK your physician about these medications Instructions Each Dose to Equal  
 Morning Noon Evening Bedtime  
 amitriptyline 25 mg tablet Commonly known as:  ELAVIL Your last dose was: Your next dose is: Take 1 Tab by mouth nightly. Indications: NEUROPATHIC PAIN  
 25 mg  
    
   
   
   
  
 B COMPLEX SL Your last dose was: Your next dose is:    
   
   
 by SubLINGual route daily. CeleXA 10 mg tablet Generic drug:  citalopram  
   
Your last dose was: Your next dose is: Take 20 mg by mouth every evening. 20 mg CLARITIN 10 mg tablet Generic drug:  loratadine Your last dose was: Your next dose is: Take 10 mg by mouth every evening. 10 mg  
    
   
   
   
  
 COQ10  PO Your last dose was: Your next dose is: Take  by mouth nightly.  
     
   
   
   
  
 gabapentin 100 mg capsule Commonly known as:  NEURONTIN Your last dose was: Your next dose is: TAKE THREE CAPSULES BY MOUTH EVERY DAY  
     
   
   
   
  
 ibuprofen 800 mg tablet Commonly known as:  MOTRIN Your last dose was: Your next dose is: Take 1 Tab by mouth every six (6) hours as needed. 800 mg KlonoPIN 0.5 mg tablet Generic drug:  clonazePAM  
   
Your last dose was: Your next dose is: Take  by mouth nightly as needed. magnesium oxide 400 mg tablet Commonly known as:  MAG-OX Your last dose was: Your next dose is: Take 400 mg by mouth daily. 400 mg  
    
   
   
   
  
 melatonin 3 mg tablet Your last dose was: Your next dose is: Take 5 mg by mouth nightly. 5 mg  
    
   
   
   
  
 minocycline 100 mg capsule Commonly known as:  Atifjosé Mcconnell Your last dose was: Your next dose is: Take  by mouth every Monday, Wednesday, Friday. Pt takes twice a day on these days For antiinflammatory in connection with endometriosis  
     
   
   
   
  
 multivitamin tablet Commonly known as:  ONE A DAY Your last dose was: Your next dose is: Take 1 Tab by mouth daily. 1 Tab PROBIOTIC PO Your last dose was: Your next dose is: Take  by mouth.  
     
   
   
   
  
 promethazine 25 mg tablet Commonly known as:  PHENERGAN  
   
 Your last dose was: Your next dose is: Take 25 mg by mouth every six (6) hours as needed for Nausea. 25 mg  
    
   
   
   
  
 SOMA 250 mg tablet Generic drug:  carisoprodol Your last dose was: Your next dose is: Take 250 mg by mouth four (4) times daily. Pt takes only 1 x/d at most at HS  
 250 mg  
    
   
   
   
  
 traMADol 50 mg tablet Commonly known as:  ULTRAM  
   
Your last dose was: Your next dose is: Take 1-2 Tabs by mouth every six (6) hours as needed for Pain. Max Daily Amount: 400 mg.  
  mg  
    
   
   
   
  
 XANAX 0.25 mg tablet Generic drug:  ALPRAZolam  
   
Your last dose was: Your next dose is: Take 0.25 mg by mouth now. 0.25 mg Discharge Instructions ACTIVITY · As tolerated and as directed by your doctor. · Bathe or shower as directed by your doctor. DIET · Clear liquids until no nausea or vomiting; then light diet for the first day. · Advance to regular diet on second day, unless your doctor orders otherwise. · If nausea and vomiting continues, call your doctor. PAIN 
· Take pain medication as directed by your doctor. · Call your doctor if pain is NOT relieved by medication. · DO NOT take aspirin of blood thinners unless directed by your doctor. DRESSING CARE  
 
 
 
 After general anesthesia or intravenous sedation, for 24 hours or while taking prescription Narcotics: · Limit your activities · Do not drive and operate hazardous machinery · Do not make important personal or business decisions · Do  not drink alcoholic beverages · If you have not urinated within 8 hours after discharge, please contact your surgeon on call. *  Please give a list of your current medications to your Primary Care Provider. *  Please update this list whenever your medications are discontinued, doses are 
    changed, or new medications (including over-the-counter products) are added. *  Please carry medication information at all times in case of emergency situations. These are general instructions for a healthy lifestyle: No smoking/ No tobacco products/ Avoid exposure to second hand smoke Surgeon General's Warning:  Quitting smoking now greatly reduces serious risk to your health. Obesity, smoking, and sedentary lifestyle greatly increases your risk for illness A healthy diet, regular physical exercise & weight monitoring are important for maintaining a healthy lifestyle You may be retaining fluid if you have a history of heart failure or if you experience any of the following symptoms:  Weight gain of 3 pounds or more overnight or 5 pounds in a week, increased swelling in our hands or feet or shortness of breath while lying flat in bed. Please call your doctor as soon as you notice any of these symptoms; do not wait until your next office visit. Recognize signs and symptoms of STROKE: 
 
F-face looks uneven A-arms unable to move or move unevenly S-speech slurred or non-existent T-time-call 911 as soon as signs and symptoms begin-DO NOT go Back to bed or wait to see if you get better-TIME IS BRAIN. Introducing hospitals & HEALTH SERVICES! Dear Karen Reece: Thank you for requesting a [employer] account.   Our records indicate that you have previously registered for a [employer] account but its currently inactive. Please call our [employer] support line at [phone]. Additional Information If you have questions, please visit the Frequently Asked Questions section of the [employer] website at [email or web address] Remember, MyChart is NOT to be used for urgent needs. For medical emergencies, dial 911. Now available from your iPhone and Android! Providers Seen During Your Hospitalization Provider Specialty Primary office phone Renata Hampton MD Physical Medicine and Rehab [phone] Your Primary Care Physician (PCP) Primary Care Physician Office Phone Office Fax Nayeli Marcelo [phone] You are allergic to the following Allergen Reactions Codeine Hives Dermagraft (Cult Skin Subst, Human-Bovine) Hives Recent Documentation Weight BMI OB Status Smoking Status 62.6 kg 20.38 kg/m2 Hysterectomy Never Smoker Emergency Contacts Name Discharge Info Relation Home Work Mobile BobJacky DISCHARGE CAREGIVER [3] Spouse [3] [phone] Kae Robins DISCHARGE CAREGIVER [3] Mother [14] [phone] Patient Belongings The following personal items are in your possession at time of discharge: 
  Dental Appliances: None  Visual Aid: Glasses      Home Medications: None   Jewelry: None  Clothing: Undergarments, Pants, Shirt    Other Valuables: None Please provide this summary of care documentation to your next provider. Signatures-by signing, you are acknowledging that this After Visit Summary has been reviewed with you and you have received a copy. Patient Signature:  ____________________________________________________________ Date:  ____________________________________________________________  
  
Leann Naranjo  Provider Signature: ____________________________________________________________ Date:  ____________________________________________________________

## 2017-12-21 NOTE — DISCHARGE INSTRUCTIONS
ACTIVITY  · As tolerated and as directed by your doctor. · Bathe or shower as directed by your doctor. DIET  · Clear liquids until no nausea or vomiting; then light diet for the first day. · Advance to regular diet on second day, unless your doctor orders otherwise. · If nausea and vomiting continues, call your doctor. PAIN  · Take pain medication as directed by your doctor. · Call your doctor if pain is NOT relieved by medication. · DO NOT take aspirin of blood thinners unless directed by your doctor. DRESSING CARE       CALL YOUR DOCTOR IF   · Excessive bleeding that does not stop after holding pressure over the area  · Temperature of 101 degrees F or above  · Excessive redness, swelling or bruising, and/ or green or yellow, smelly discharge from incision    AFTER ANESTHESIA   · For the first 24 hours: DO NOT Drive, Drink alcoholic beverages, or Make important decisions. · Be aware of dizziness following anesthesia and while taking pain medication. DISCHARGE SUMMARY from Nurse    PATIENT INSTRUCTIONS:    After general anesthesia or intravenous sedation, for 24 hours or while taking prescription Narcotics:  · Limit your activities  · Do not drive and operate hazardous machinery  · Do not make important personal or business decisions  · Do  not drink alcoholic beverages  · If you have not urinated within 8 hours after discharge, please contact your surgeon on call. *  Please give a list of your current medications to your Primary Care Provider. *  Please update this list whenever your medications are discontinued, doses are      changed, or new medications (including over-the-counter products) are added. *  Please carry medication information at all times in case of emergency situations.       These are general instructions for a healthy lifestyle:    No smoking/ No tobacco products/ Avoid exposure to second hand smoke    Surgeon General's Warning:  Quitting smoking now greatly reduces serious risk to your health. Obesity, smoking, and sedentary lifestyle greatly increases your risk for illness    A healthy diet, regular physical exercise & weight monitoring are important for maintaining a healthy lifestyle    You may be retaining fluid if you have a history of heart failure or if you experience any of the following symptoms:  Weight gain of 3 pounds or more overnight or 5 pounds in a week, increased swelling in our hands or feet or shortness of breath while lying flat in bed. Please call your doctor as soon as you notice any of these symptoms; do not wait until your next office visit. Recognize signs and symptoms of STROKE:    F-face looks uneven    A-arms unable to move or move unevenly    S-speech slurred or non-existent    T-time-call 911 as soon as signs and symptoms begin-DO NOT go       Back to bed or wait to see if you get better-TIME IS BRAIN.

## 2017-12-28 ENCOUNTER — HOSPITAL ENCOUNTER (OUTPATIENT)
Dept: PHYSICAL THERAPY | Age: 32
Discharge: HOME | End: 2017-12-28
Payer: COMMERCIAL

## 2017-12-28 PROCEDURE — 97110 THERAPEUTIC EXERCISES: CPT

## 2017-12-28 PROCEDURE — 97140 MANUAL THERAPY 1/> REGIONS: CPT

## 2017-12-28 NOTE — PROGRESS NOTES
Robyn Rojas  : 1985  Payor: Kerline Duke / Plan: [place] / Product Type: PPO /  [address]  at [address]  [phone]   [phone]          OUTPATIENT PHYSICAL THERAPY:Daily Note 2017    ICD-10: Treatment Diagnosis: Chronic pelvic pain (R10.2); spasm of muscle (M62.838); Pelvic floor dysfunction (M62.89)  Precautions/Allergies:   Codeine and Dermagraft [cult skin subst, human-bovine]   Fall Risk Score: 0 (? 5 = High Risk)  MD Orders: Evaluate and treat MEDICAL/REFERRING DIAGNOSIS:  Pelvic floor dysfunction [M62.89]   DATE OF ONSET: Chronic endometriosis since menstruation  REFERRING PHYSICIAN: Waylon Oneal, *  RETURN PHYSICIAN APPOINTMENT: TBD     INITIAL ASSESSMENT:  Ms. Dennis Mooney presents with significant pelvic floor muscle (PFM) over activity and lack of coordination, as well as connective tissue restrictions and trigger points in her adductors, abdominal wall and posterior chain; this results in chronic pelvic pain. She also demonstrates weakness throughout her core musculature and pelvic girdle. This is contributing to her difficulty with all ADL's and physical activity. I believe she will benefit from skilled PT, with an emphasis on manual therapy and muscle retraining, strength, and coordination to improve her ability to perform housework, , work responsibilities and physical activity without pain or difficulty. She is pleasant, motivated and eager to return to her prior level of function. Progress may be imited, however, due to the chronic and severe nature of her pain. PROBLEM LIST (Impacting functional limitations):  1. Decreased Strength  2. Decreased ADL/Functional Activities  3. Increased Pain  4. Decreased Activity Tolerance INTERVENTIONS PLANNED:  1. Electrical Stimulation  2. Home Exercise Program (HEP)  3. Therapeutic Activites  4.  Therapeutic Exercise/Strengthening Interval History:    Review Of Systems:  See above, otherwise, complete 10 point review of systems negative.    [ ]Unable to obtain    Medications:  digoxin     Tablet 0.125milliGRAM(s) Oral daily  atorvastatin 80milliGRAM(s) Oral at bedtime  metoprolol succinate ER 25milliGRAM(s) Oral daily  aspirin  chewable 81milliGRAM(s) Oral daily  losartan 50milliGRAM(s) Oral daily  pantoprazole    Tablet 40milliGRAM(s) Oral before breakfast  ertapenem  IVPB 1000milliGRAM(s) IV Intermittent every 24 hours  ertapenem  IVPB  IV Intermittent   furosemide    Tablet 40milliGRAM(s) Oral daily  potassium chloride    Tablet ER 20milliEquivalent(s) Oral daily  loperamide 2milliGRAM(s) Oral every 4 hours PRN    Vitals:  T(C): 36.7, Max: 37 (06-13 @ 20:39)  HR: 70 (70 - 73)  BP: --  BP(mean): 78 (76 - 78)  ABP: --  ABP(mean): --  RR: 18 (18 - 18)  SpO2: 99% (98% - 99%)  Wt(kg): --  CVP(cm H2O): --  CO: --  CI: --  PA: --  PA(mean): --  PCWP: --  SVR: --  PVR: --    Daily     Daily Weight in k.1 (2017 09:59)        I&O's Summary  I & Os for 24h ending 2017 07:00  =============================================  IN: 600 ml / OUT: 1000 ml / NET: -400 ml    I & Os for current day (as of 2017 12:19)  =============================================  IN: 240 ml / OUT: 300 ml / NET: -60 ml      Physical Exam:  Appearance: No Acute Distress  HEENT:   Normal oral mucosa, PERRL, EOMI	  Cardiovascular: Normal S1 S2, No JVD, No murmurs/rubs/gallops  Respiratory: Clear to auscultation bilaterally  Gastrointestinal:  Soft, Non-tender	  Skin: No cyanosis	  Neurologic: Non-focal  Extremities: No clubbing, cyanosis or edema  Vascular: 2+ DP/PT pulses bilateral  Psychiatry: A & O x 3, Mood & affect appropriate    LVAD Interrogation:  Pump Flow:  Pump Speed:  Pulse Index:  Pump Power:  VAD Events:   Driveline evaluation:    Programming Changes: [ ] No changes made [ ] Changes made:    Labs:                        11.7   5.3   )-----------( 257      ( 2017 07:13 )             36.3         140  |  102  |  8   ----------------------------<  91  4.1   |  25  |  0.81    Ca    9.5      2017 07:13      PT/INR - ( 2017 07:13 )   PT: 27.1 sec;   INR: 2.46 ratio                   Lactate Dehydrogenase, Serum: 286 U/L ( @ 07:13)          TELEMETRY: 	    ECG:      [ ] Echocardiogram:    -----------------------------------------------------------------     [ ] ACC/AHA Stage: _____     [ ] NYHA Class: _____ TREATMENT PLAN:  Effective Dates: 11/7/2017 TO 1/30/2018. Frequency/Duration: Once every two weeks for 12 weeks. GOALS: (Goals have been discussed and agreed upon with patient.)  Short-Term Functional Goals: Time Frame: 6 weeks  1. Pt will demonstrate N voluntary relaxation of PFM to improve her ability to urinate without hesitancy or incomplete emptying. 2. Pt will demonstrate I with basic PFM HEP to improve resting tone and decrease pain with prolonged sitting. Discharge Goals: Time Frame: 12 weeks  1. Pt will reports < 2/10 pain with 1 finger palpation of PFM and obturator internus (OI); to initiate pain free intercourse with spouse. 2. Pt will demonstrate appropriate co contraction of the Transversus Abdominus and Pelvic Floor muscle group (without excessive compensation), and maintain x 60 seconds to improve core stability and return to dynamic physical activity such as running and jumping. 3. Pt will tolerate dilator training with < 3/10 pain to initiate at home and improve tenderness in PFM to return to pain free sexual activity with spouse. Rehabilitation Potential For Stated Goals: Good                The information in this section was collected on 11/7/2017 (except where otherwise noted). HISTORY:   History of Present Injury/Illness (Reason for Referral):  Pt with a history of chronic endometriosis. This has resulted in > 10 surgeries to remove, including hysterectomy (has ovaries) and excision in November 2016. Pain comes and goes, but is always present. Recent worsening of pain in beginning of November. Urinary: Complains of hesitancy and incomplete emptying. Takes ~ 15-30 seconds to start stream. Denies UI or dysuria at this time,  Bowel: History of constipation managed with OTC medications. However, recently has been going daily with push/strain. Sexual: Sex has always been painful. Currently, pain during is 6/10 (diffuse in the vagina) and 8/10 after (throughout the pelvic girdle).  10/10 if he \"hits my cuff\". Pelvic Organ Prolapse/Pelvic Pain: Daily pelvic pain reported in L LQ. Sharp, shooting down anterior thigh on L. Can radiate up to ribs. Best pain is 6/19o while on pain medication, worst 10/10. Nothing seems to make pain better other than meds, prolonged sitting or standing, as well as ADL's increase pain. Past Medical History/Comorbidities:   Ms. Umu Rahman  has a past medical history of Anxiety (9/6/2013); Endometriosis; Gluten intolerance; Migraine; Nausea & vomiting; Ovarian cyst; PUD (peptic ulcer disease) (12/2013 ); Unspecified adverse effect of anesthesia; UTI (urinary tract infection); and Vaginal cuff dehiscence. Ms. Umu Rahman  has a past surgical history that includes hx hysteroscopy; pr endometrial cryoablation; hx other surgical; hx bilateral salpingectomy; hx other surgical (11/2016); hx total laparoscopic hysterectomy; hx breast augmentation (2003); hx gyn (2000, 2004); hx dilation and curettage; and hx pelvic laparoscopy (11/03/2016). Social History/Living Environment:     Pt lives in [address] [place] [address] home with spouse and 3 children  Prior Level of Function/Work/Activity:  Works part time at [employer]    Current Medications:       Current Outpatient Prescriptions:     ALPRAZolam (XANAX) 0.25 mg tablet, Take 0.25 mg by mouth now., Disp: , Rfl:     UBIDECARENONE/VITAMIN E MIXED (COQ10  PO), Take  by mouth nightly., Disp: , Rfl:     VIT B2/NIACIN/B6/B12/DEXPANTH (B COMPLEX SL), by SubLINGual route daily. , Disp: , Rfl:     carisoprodol (SOMA) 250 mg tablet, Take 250 mg by mouth four (4) times daily. Pt takes only 1 x/d at most at [facility], Disp: , Rfl:     minocycline (MINOCIN, DYNACIN) 100 mg capsule, Take  by mouth every Monday, Wednesday, Friday. Pt takes twice a day on these days For antiinflammatory in connection with endometriosis, Disp: , Rfl:     traMADol (ULTRAM) 50 mg tablet, Take 1-2 Tabs by mouth every six (6) hours as needed for Pain.  Max Daily Amount: 400 mg., Disp: 60 Tab, Rfl: 0    clonazePAM (KLONOPIN) 0.5 mg tablet, Take  by mouth nightly as needed. , Disp: , Rfl:     amitriptyline (ELAVIL) 25 mg tablet, Take 1 Tab by mouth nightly. Indications: NEUROPATHIC PAIN, Disp: 30 Tab, Rfl: 11    gabapentin (NEURONTIN) 100 mg capsule, TAKE THREE CAPSULES BY MOUTH EVERY DAY (Patient taking differently: TAKE THREE CAPSULES BY MOUTH EVERY DAY at HS), Disp: 90 Cap, Rfl: 6    citalopram (CELEXA) 10 mg tablet, Take 20 mg by mouth every evening., Disp: , Rfl:     promethazine (PHENERGAN) 25 mg tablet, Take 25 mg by mouth every six (6) hours as needed for Nausea., Disp: , Rfl:     ibuprofen (MOTRIN) 800 mg tablet, Take 1 Tab by mouth every six (6) hours as needed. (Patient taking differently: Take 800 mg by mouth every six (6) hours as needed. Last dose 12/17/17), Disp: 30 Tab, Rfl: 0    magnesium oxide (MAG-OX) 400 mg tablet, Take 400 mg by mouth daily. , Disp: , Rfl:     melatonin 3 mg tablet, Take 5 mg by mouth nightly., Disp: , Rfl:     loratadine (CLARITIN) 10 mg tablet, Take 10 mg by mouth every evening., Disp: , Rfl:     LACTOBACILLUS ACIDOPHILUS (PROBIOTIC PO), Take  by mouth., Disp: , Rfl:     multivitamin (ONE A DAY) tablet, Take 1 Tab by mouth daily. , Disp: , Rfl:    Date Last Reviewed:  12/28/2017     Number of Personal Factors/Comorbidities that affect the Plan of Care: 1-2: MODERATE COMPLEXITY   EXAMINATION:   Palpation:          Adductors: Moderate connective tissue (CT) restrictions B, L>R with pain and wincing. Pudendal nerve glides significantly tender to palpation (TTP). Thoracic Spine: T10-12 refers pain towards vaginal cuff, however mobility is WNL. ROM:          Pelvic mobility: WNL and painfree. Lumbar Mobility: WNL, and painfree. Strength: Of PFM via SEMG: Resting tone generally 10-15 mV. Superficial PFM via vaginal canal: Power at least 3/5 with moderate delay in relaxation.  Transverse Abdominus (TA): Good with strong PFM co contraction       Body Structures Involved:  1. Muscles Body Functions Affected:  1. Reproductive  2. Neuromusculoskeletal  3. Movement Related Activities and Participation Affected:  1. General Tasks and Demands  2. Mobility  3. Self Care  4. Interpersonal Interactions and Relationships   Number of elements (examined above) that affect the Plan of Care: 3: MODERATE COMPLEXITY   CLINICAL PRESENTATION:   Presentation: Evolving clinical presentation with changing clinical characteristics: MODERATE COMPLEXITY   CLINICAL DECISION MAKING:   Outcome Measure: Tool Used: Pain Disability Index  Score:  Initial: 11/7/2017; 42 Most Recent: TBD   Interpretation of Score: The rating scale measures the degree to which aspects of your life are disrupted by chronic pain. For each of the 7 categories, patient circles the level of disability they experience 0 to 10. 0 signifies no disability at all, 10 signifies that these activities are totally disrupted by pain. Medical Necessity:   · Patient is expected to demonstrate progress in range of motion, coordination and functional technique to return to painfree ADL's, work activities and recreation. Reason for Services/Other Comments:  · Patient continues to require skilled intervention due to above mentioned deficits. Use of outcome tool(s) and clinical judgement create a POC that gives a: Questionable prediction of patient's progress: MODERATE COMPLEXITY            TREATMENT:   (In addition to Assessment/Re-Assessment sessions the following treatments were rendered)    Pre-treatment Symptoms/Complaints:  Had a superior hypogastric plexus nerve block on the 21st, reports severe low back pain. No change in pelvic pain at this time. Pain: Initial: Pain Intensity 1: 6/10 Post Session:  5/10 (very sore in proximal thighs)     THERAPEUTIC EXERCISE: (10 minutes):  Exercises per grid below to improve mobility and coordination.   Required moderate verbal and tactile cues to promote proper performance of exercise. Progressed repetitions and complexity of movement as indicated. Date:  12/28/2017     Activity/Exercise Parameters   Pelvic Floor Drops Supine; 4 minutes   Diaphragmatic Breathing 2 minutes   Hip Flexor Stretch 2 minutes   Adductor stretch Performed B. Supine frog; 2 minutes   Home Exercise Program Drops, DKTC/Adductor Stretch, Pelvic Mobility, Dilators       MANUAL THERAPY: (45 minutes): Joint mobilization and Soft tissue mobilization was utilized and necessary because of the patient's painful spasm. (Used abbreviations: MET - muscle energy technique; SCS- Strain counter strain; CTM- Connective tissue mobilizations; CR- Contract/relax; SP- Sustained pressure). - SP utilized to superficial and deep PFM, as well as OI with 1 finger, followed by CTM and IASTM to B adductors, pudendal nerve glides B, gentle iliacus release, CTM to suprapubic region, and external PFM release. Ended session with trigger point dry needling performed to B adductor sami/longus and distal medial hamstring. Treatment/Session Assessment:    · Response to Treatment:  Continued to tolerate increased MT to PFM this date, though significant pain and wincing noted on L. Since not using dilators last week due to pain from injection, encouraged to use 2-3 x this week to see if injection has improved tolerance for internal.     · Compliance with Program/Exercises: Compliant with downtraining at this time. · Recommendations/Intent for next treatment session: \"Next visit will focus on semg, internal, ischiorectal fossa, thoracic spine mobility\".     Total Treatment Duration: 55 minutes; 5 min regla/doff clothing  PT Patient Time In/Time Out  Time In: 0805  Time Out: 1616    Dmitry Feng PT Interval History:  Pt feeling better w/ resolution of abdominal pain.    Review Of Systems:  See above, otherwise, complete 10 point review of systems negative.    [ ]Unable to obtain    Medications:  digoxin     Tablet 0.125milliGRAM(s) Oral daily  atorvastatin 80milliGRAM(s) Oral at bedtime  metoprolol succinate ER 25milliGRAM(s) Oral daily  aspirin  chewable 81milliGRAM(s) Oral daily  losartan 50milliGRAM(s) Oral daily  pantoprazole    Tablet 40milliGRAM(s) Oral before breakfast  ertapenem  IVPB 1000milliGRAM(s) IV Intermittent every 24 hours  ertapenem  IVPB  IV Intermittent   furosemide    Tablet 40milliGRAM(s) Oral daily  potassium chloride    Tablet ER 20milliEquivalent(s) Oral daily  loperamide 2milliGRAM(s) Oral every 4 hours PRN    Vitals:  T(C): 36.7, Max: 37 (06-13 @ 20:39)  HR: 70 (70 - 73)  BP: --  BP(mean): 78 (76 - 78)  ABP: --  ABP(mean): --  RR: 18 (18 - 18)  SpO2: 99% (98% - 99%)  Wt(kg): --    Daily     Daily Weight in k.1 (2017 09:59)      I&O's Summary  I & Os for 24h ending 2017 07:00  =============================================  IN: 600 ml / OUT: 1000 ml / NET: -400 ml    I & Os for current day (as of 2017 12:19)  =============================================  IN: 240 ml / OUT: 300 ml / NET: -60 ml      Physical Exam:  Appearance: No Acute Distress  HEENT: No JVD  Cardiovascular: + LVAD hum  Respiratory: Clear to auscultation bilaterally  Gastrointestinal:  Soft, Non-tender	  Skin: No cyanosis	  Neurologic: Non-focal  Extremities: No edema  Psychiatry: A & O x 3, Mood & affect appropriate      Labs:                        11.7   5.3   )-----------( 257      ( 2017 07:13 )             36.3         140  |  102  |  8   ----------------------------<  91  4.1   |  25  |  0.81    Ca    9.5      2017 07:13      PT/INR - ( 2017 07:13 )   PT: 27.1 sec;   INR: 2.46 ratio         Lactate Dehydrogenase, Serum: 286 U/L ( @ 07:13)        TELEMETRY: 	    ECG:      [ ] Echocardiogram:    -----------------------------------------------------------------     [ ] ACC/AHA Stage: _____     [ ] NYHA Class: _____ Interval History:  Pt feeling better w/ resolution of abdominal pain.      Medications:  digoxin     Tablet 0.125milliGRAM(s) Oral daily  atorvastatin 80milliGRAM(s) Oral at bedtime  metoprolol succinate ER 25milliGRAM(s) Oral daily  aspirin  chewable 81milliGRAM(s) Oral daily  losartan 50milliGRAM(s) Oral daily  pantoprazole    Tablet 40milliGRAM(s) Oral before breakfast  ertapenem  IVPB 1000milliGRAM(s) IV Intermittent every 24 hours  ertapenem  IVPB  IV Intermittent   furosemide    Tablet 40milliGRAM(s) Oral daily  potassium chloride    Tablet ER 20milliEquivalent(s) Oral daily  loperamide 2milliGRAM(s) Oral every 4 hours PRN    Vitals:  T(C): 36.7, Max: 37 (06-13 @ 20:39)  HR: 70 (70 - 73)  BP: --  BP(mean): 78 (76 - 78)  ABP: --  ABP(mean): --  RR: 18 (18 - 18)  SpO2: 99% (98% - 99%)  Wt(kg): --    Daily     Daily Weight in k.1 (2017 09:59)      I&O's Summary  I & Os for 24h ending 2017 07:00  =============================================  IN: 600 ml / OUT: 1000 ml / NET: -400 ml    I & Os for current day (as of 2017 12:19)  =============================================  IN: 240 ml / OUT: 300 ml / NET: -60 ml      Physical Exam:  Appearance: No Acute Distress  HEENT: No JVD  Cardiovascular: + LVAD hum  Respiratory: Clear to auscultation bilaterally  Gastrointestinal:  Soft, Non-tender	  Skin: No cyanosis	  Neurologic: Non-focal  Extremities: No edema  Psychiatry: A & O x 3, Mood & affect appropriate      Labs:                        11.7   5.3   )-----------( 257      ( 2017 07:13 )             36.3         140  |  102  |  8   ----------------------------<  91  4.1   |  25  |  0.81    Ca    9.5      2017 07:13      PT/INR - ( 2017 07:13 )   PT: 27.1 sec;   INR: 2.46 ratio         Lactate Dehydrogenase, Serum: 286 U/L ( @ 07:13)

## 2018-01-04 ENCOUNTER — HOSPITAL ENCOUNTER (OUTPATIENT)
Dept: PHYSICAL THERAPY | Age: 33
Discharge: HOME | End: 2018-01-04
Payer: COMMERCIAL

## 2018-01-04 PROCEDURE — 97110 THERAPEUTIC EXERCISES: CPT

## 2018-01-04 PROCEDURE — 97140 MANUAL THERAPY 1/> REGIONS: CPT

## 2018-01-04 NOTE — PROGRESS NOTES
Lili Males Bob  : 1985  Payor: Ann Daily / [place] / Product Type: PPO /  [address]  at [address]  [phone]   [phone]          OUTPATIENT PHYSICAL THERAPY:Daily Note 2018    ICD-10: Treatment Diagnosis: Chronic pelvic pain (R10.2); spasm of muscle (M62.838); Pelvic floor dysfunction (M62.89)  Precautions/Allergies:   Codeine and Dermagraft [cult skin subst, human-bovine]   Fall Risk Score: 0 (? 5 = High Risk)  MD Orders: Evaluate and treat MEDICAL/REFERRING DIAGNOSIS:  Pelvic floor dysfunction [M62.89]   DATE OF ONSET: Chronic endometriosis since menstruation  REFERRING PHYSICIAN: Sai Talbert, *  RETURN PHYSICIAN APPOINTMENT: TBD     INITIAL ASSESSMENT:  Ms. Astrid Vallecillo presents with significant pelvic floor muscle (PFM) over activity and lack of coordination, as well as connective tissue restrictions and trigger points in her adductors, abdominal wall and posterior chain; this results in chronic pelvic pain. She also demonstrates weakness throughout her core musculature and pelvic girdle. This is contributing to her difficulty with all ADL's and physical activity. I believe she will benefit from skilled PT, with an emphasis on manual therapy and muscle retraining, strength, and coordination to improve her ability to perform housework, , work responsibilities and physical activity without pain or difficulty. She is pleasant, motivated and eager to return to her prior level of function. Progress may be imited, however, due to the chronic and severe nature of her pain. PROBLEM LIST (Impacting functional limitations):  1. Decreased Strength  2. Decreased ADL/Functional Activities  3. Increased Pain  4. Decreased Activity Tolerance INTERVENTIONS PLANNED:  1. Electrical Stimulation  2. Home Exercise Program (HEP)  3. Therapeutic Activites  4.  Therapeutic Exercise/Strengthening TREATMENT PLAN:  Effective Dates: 11/7/2017 TO 1/30/2018. Frequency/Duration: Once every two weeks for 12 weeks. GOALS: (Goals have been discussed and agreed upon with patient.)  Short-Term Functional Goals: Time Frame: 6 weeks  1. Pt will demonstrate N voluntary relaxation of PFM to improve her ability to urinate without hesitancy or incomplete emptying. 2. Pt will demonstrate I with basic PFM HEP to improve resting tone and decrease pain with prolonged sitting. (MET 1/4/2018)  Discharge Goals: Time Frame: 12 weeks  1. Pt will reports < 2/10 pain with 1 finger palpation of PFM and obturator internus (OI); to initiate pain free intercourse with spouse. 2. Pt will demonstrate appropriate co contraction of the Transversus Abdominus and Pelvic Floor muscle group (without excessive compensation), and maintain x 60 seconds to improve core stability and return to dynamic physical activity such as running and jumping. 3. Pt will tolerate dilator training with < 3/10 pain to initiate at home and improve tenderness in PFM to return to pain free sexual activity with spouse. Rehabilitation Potential For Stated Goals: Good                The information in this section was collected on 11/7/2017 (except where otherwise noted). HISTORY:   History of Present Injury/Illness (Reason for Referral):  Pt with a history of chronic endometriosis. This has resulted in > 10 surgeries to remove, including hysterectomy (has ovaries) and excision in November 2016. Pain comes and goes, but is always present. Recent worsening of pain in beginning of November. Urinary: Complains of hesitancy and incomplete emptying. Takes ~ 15-30 seconds to start stream. Denies UI or dysuria at this time,  Bowel: History of constipation managed with OTC medications. However, recently has been going daily with push/strain. Sexual: Sex has always been painful.  Currently, pain during is 6/10 (diffuse in the vagina) and 8/10 after (throughout the pelvic girdle). 10/10 if he \"hits my cuff\". Pelvic Organ Prolapse/Pelvic Pain: Daily pelvic pain reported in L LQ. Sharp, shooting down anterior thigh on L. Can radiate up to ribs. Best pain is 6/19o while on pain medication, worst 10/10. Nothing seems to make pain better other than meds, prolonged sitting or standing, as well as ADL's increase pain. Past Medical History/Comorbidities:   Ms. Anderson Leon  has a past medical history of Anxiety (9/6/2013); Endometriosis; Gluten intolerance; Migraine; Nausea & vomiting; Ovarian cyst; PUD (peptic ulcer disease) (12/2013 ); Unspecified adverse effect of anesthesia; UTI (urinary tract infection); and Vaginal cuff dehiscence. Ms. Anderson Leon  has a past surgical history that includes hx hysteroscopy; pr endometrial cryoablation; hx other surgical; hx bilateral salpingectomy; hx other surgical (11/2016); hx total laparoscopic hysterectomy; hx breast augmentation (2003); hx gyn (2000, 2004); hx dilation and curettage; and hx pelvic laparoscopy (11/03/2016). Social History/Living Environment:     Pt lives in 2 [place] home with spouse and 3 children  Prior Level of Function/Work/Activity:  Works part time at [employer]    Current Medications:       Current Outpatient Prescriptions:     ALPRAZolam (XANAX) 0.25 mg tablet, Take 0.25 mg by mouth now., Disp: , Rfl:     UBIDECARENONE/VITAMIN E MIXED (COQ10  PO), Take  by mouth nightly., Disp: , Rfl:     VIT B2/NIACIN/B6/B12/DEXPANTH (B COMPLEX SL), by SubLINGual route daily. , Disp: , Rfl:     carisoprodol (SOMA) 250 mg tablet, Take 250 mg by mouth four (4) times daily. Pt takes only 1 x/d at most at [facility], Disp: , Rfl:     minocycline (MINOCIN, DYNACIN) 100 mg capsule, Take  by mouth every Monday, Wednesday, Friday. Pt takes twice a day on these days For antiinflammatory in connection with endometriosis, Disp: , Rfl:     traMADol (ULTRAM) 50 mg tablet, Take 1-2 Tabs by mouth every six (6) hours as needed for Pain.  Max Daily Amount: 400 mg., Disp: 60 Tab, Rfl: 0    clonazePAM (KLONOPIN) 0.5 mg tablet, Take  by mouth nightly as needed. , Disp: , Rfl:     amitriptyline (ELAVIL) 25 mg tablet, Take 1 Tab by mouth nightly. Indications: NEUROPATHIC PAIN, Disp: 30 Tab, Rfl: 11    gabapentin (NEURONTIN) 100 mg capsule, TAKE THREE CAPSULES BY MOUTH EVERY DAY (Patient taking differently: TAKE THREE CAPSULES BY MOUTH EVERY DAY at HS), Disp: 90 Cap, Rfl: 6    citalopram (CELEXA) 10 mg tablet, Take 20 mg by mouth every evening., Disp: , Rfl:     promethazine (PHENERGAN) 25 mg tablet, Take 25 mg by mouth every six (6) hours as needed for Nausea., Disp: , Rfl:     ibuprofen (MOTRIN) 800 mg tablet, Take 1 Tab by mouth every six (6) hours as needed. (Patient taking differently: Take 800 mg by mouth every six (6) hours as needed. Last dose 12/17/17), Disp: 30 Tab, Rfl: 0    magnesium oxide (MAG-OX) 400 mg tablet, Take 400 mg by mouth daily. , Disp: , Rfl:     melatonin 3 mg tablet, Take 5 mg by mouth nightly., Disp: , Rfl:     loratadine (CLARITIN) 10 mg tablet, Take 10 mg by mouth every evening., Disp: , Rfl:     LACTOBACILLUS ACIDOPHILUS (PROBIOTIC PO), Take  by mouth., Disp: , Rfl:     multivitamin (ONE A DAY) tablet, Take 1 Tab by mouth daily. , Disp: , Rfl:    Date Last Reviewed:  1/4/2018     Number of Personal Factors/Comorbidities that affect the Plan of Care: 1-2: MODERATE COMPLEXITY   EXAMINATION:   Palpation:          Adductors: Moderate connective tissue (CT) restrictions B, L>R with pain and wincing. Pudendal nerve glides significantly tender to palpation (TTP). Thoracic Spine: T10-12 refers pain towards vaginal cuff, however mobility is WNL. ROM:          Pelvic mobility: WNL and painfree. Lumbar Mobility: WNL, and painfree. Strength: Of PFM via SEMG: Resting tone generally 10-15 mV. Superficial PFM via vaginal canal: Power at least 3/5 with moderate delay in relaxation.  Transverse Abdominus (TA): Good with strong PFM co contraction       Body Structures Involved:  1. Muscles Body Functions Affected:  1. Reproductive  2. Neuromusculoskeletal  3. Movement Related Activities and Participation Affected:  1. General Tasks and Demands  2. Mobility  3. Self Care  4. Interpersonal Interactions and Relationships   Number of elements (examined above) that affect the Plan of Care: 3: MODERATE COMPLEXITY   CLINICAL PRESENTATION:   Presentation: Evolving clinical presentation with changing clinical characteristics: MODERATE COMPLEXITY   CLINICAL DECISION MAKING:   Outcome Measure: Tool Used: Pain Disability Index  Score:  Initial: 11/7/2017; 42 Most Recent: TBD   Interpretation of Score: The rating scale measures the degree to which aspects of your life are disrupted by chronic pain. For each of the 7 categories, patient circles the level of disability they experience 0 to 10. 0 signifies no disability at all, 10 signifies that these activities are totally disrupted by pain. Medical Necessity:   · Patient is expected to demonstrate progress in range of motion, coordination and functional technique to return to painfree ADL's, work activities and recreation. Reason for Services/Other Comments:  · Patient continues to require skilled intervention due to above mentioned deficits. Use of outcome tool(s) and clinical judgement create a POC that gives a: Questionable prediction of patient's progress: MODERATE COMPLEXITY            TREATMENT:   (In addition to Assessment/Re-Assessment sessions the following treatments were rendered)    Pre-treatment Symptoms/Complaints:  LBP from injection is improving, but she is on steroids. Woke up this am, 7.5/10 and better since taking medication. No improvements in pelvic pain since nerve block. Spoke to MD at [facility] and discussed plan for continued pain. Possible surgery in March.     Pain: Initial: Pain Intensity 1: 7/10 Post Session:  5/10 (very sore in proximal thighs) THERAPEUTIC EXERCISE: (10 minutes):  Exercises per grid below to improve mobility and coordination. Required moderate verbal and tactile cues to promote proper performance of exercise. Progressed repetitions and complexity of movement as indicated. Date:  1/4/2018     Activity/Exercise Parameters   Pelvic Floor Drops Supine; 2 minutes   Diaphragmatic Breathing 2 minutes   Prone Press Ups 2 minutes   Hip Flexor Stretch 2 minutes   Adductor stretch Performed B. Supine frog; 2 minutes   Home Exercise Program Drops, DKTC/Adductor Stretch, Pelvic Mobility, Dilators       MANUAL THERAPY: (50 minutes): Joint mobilization and Soft tissue mobilization was utilized and necessary because of the patient's painful spasm. (Used abbreviations: MET - muscle energy technique; SCS- Strain counter strain; CTM- Connective tissue mobilizations; CR- Contract/relax; SP- Sustained pressure). - SP utilized to superficial and deep PFM, as well as OI with 1 finger, followed by CTM and IASTM to B adductors, pudendal nerve glides B, gentle iliacus release, CTM to suprapubic region, and external PFM release. In prone, performed lumbar and sacral PA's (2/3) to improve mobility and decrease pain. Ended session with trigger point dry needling performed to B adductor sami/longus and distal medial hamstring. Treatment/Session Assessment:    · Response to Treatment:  Continued to tolerate increased MT to PFM this date, though continued significant pain and wincing noted on L. Radiation of pelvic pain into L lower quadrant with palpation of R levator ani today. Discussed mobility exercises and physical activity that should be performed at home to maintain length gains made in PT.    · Compliance with Program/Exercises: Compliant with downtraining at this time. Met STG 2 at this time. · Recommendations/Intent for next treatment session: \"Next visit will focus on semg, internal, ischiorectal fossa, thoracic spine mobility\".     Total Treatment Duration: 60 minutes; 5 min regla/doff clothing  PT Patient Time In/Time Out  Time In: 0900  Time Out: 1000    Silvio Shetty PT

## 2018-01-11 ENCOUNTER — HOSPITAL ENCOUNTER (OUTPATIENT)
Dept: PHYSICAL THERAPY | Age: 33
Discharge: HOME | End: 2018-01-11
Payer: COMMERCIAL

## 2018-01-11 PROCEDURE — 97110 THERAPEUTIC EXERCISES: CPT

## 2018-01-11 PROCEDURE — 97140 MANUAL THERAPY 1/> REGIONS: CPT

## 2018-01-11 NOTE — PROGRESS NOTES
Yusuf Rojas  : 1985  Payor: Crescencio Hampton / [place] / Product Type: PPO /  Therapy Center at [address] [place] [address]  [phone]   [id number]          OUTPATIENT PHYSICAL THERAPY:Daily Note 2018    ICD-10: Treatment Diagnosis: Chronic pelvic pain (R10.2); spasm of muscle (M62.838); Pelvic floor dysfunction (M62.89)  Precautions/Allergies:   Codeine and Dermagraft [cult skin subst, human-bovine]   Fall Risk Score: 0 (? 5 = High Risk)  MD Orders: Evaluate and treat MEDICAL/REFERRING DIAGNOSIS:  Pelvic floor dysfunction [M62.89]   DATE OF ONSET: Chronic endometriosis since menstruation  REFERRING PHYSICIAN: Laci Terrell, *  RETURN PHYSICIAN APPOINTMENT: TBD     INITIAL ASSESSMENT:  Ms. Umu Rahman presents with significant pelvic floor muscle (PFM) over activity and lack of coordination, as well as connective tissue restrictions and trigger points in her adductors, abdominal wall and posterior chain; this results in chronic pelvic pain. She also demonstrates weakness throughout her core musculature and pelvic girdle. This is contributing to her difficulty with all ADL's and physical activity. I believe she will benefit from skilled PT, with an emphasis on manual therapy and muscle retraining, strength, and coordination to improve her ability to perform housework, , work responsibilities and physical activity without pain or difficulty. She is pleasant, motivated and eager to return to her prior level of function. Progress may be imited, however, due to the chronic and severe nature of her pain. PROBLEM LIST (Impacting functional limitations):  1. Decreased Strength  2. Decreased ADL/Functional Activities  3. Increased Pain  4. Decreased Activity Tolerance INTERVENTIONS PLANNED:  1. Electrical Stimulation  2. Home Exercise Program (HEP)  3. Therapeutic Activites  4.  Therapeutic Exercise/Strengthening TREATMENT PLAN:  Effective Dates: 11/7/2017 TO 1/30/2018. Frequency/Duration: Once every two weeks for 12 weeks. GOALS: (Goals have been discussed and agreed upon with patient.)  Short-Term Functional Goals: Time Frame: 6 weeks  1. Pt will demonstrate N voluntary relaxation of PFM to improve her ability to urinate without hesitancy or incomplete emptying. 2. Pt will demonstrate I with basic PFM HEP to improve resting tone and decrease pain with prolonged sitting. (MET 1/4/2018)  Discharge Goals: Time Frame: 12 weeks  1. Pt will reports < 2/10 pain with 1 finger palpation of PFM and obturator internus (OI); to initiate pain free intercourse with spouse. 2. Pt will demonstrate appropriate co contraction of the Transversus Abdominus and Pelvic Floor muscle group (without excessive compensation), and maintain x 60 seconds to improve core stability and return to dynamic physical activity such as running and jumping. 3. Pt will tolerate dilator training with < 3/10 pain to initiate at home and improve tenderness in PFM to return to pain free sexual activity with spouse. Rehabilitation Potential For Stated Goals: Good                The information in this section was collected on 11/7/2017 (except where otherwise noted). HISTORY:   History of Present Injury/Illness (Reason for Referral):  Pt with a history of chronic endometriosis. This has resulted in > 10 surgeries to remove, including hysterectomy (has ovaries) and excision in November 2016. Pain comes and goes, but is always present. Recent worsening of pain in beginning of November. Urinary: Complains of hesitancy and incomplete emptying. Takes ~ 15-30 seconds to start stream. Denies UI or dysuria at this time,  Bowel: History of constipation managed with OTC medications. However, recently has been going daily with push/strain. Sexual: Sex has always been painful.  Currently, pain during is 6/10 (diffuse in the vagina) and 8/10 after (throughout the pelvic girdle). 10/10 if he \"hits my cuff\". Pelvic Organ Prolapse/Pelvic Pain: Daily pelvic pain reported in L LQ. Sharp, shooting down anterior thigh on L. Can radiate up to ribs. Best pain is 6/19o while on pain medication, worst 10/10. Nothing seems to make pain better other than meds, prolonged sitting or standing, as well as ADL's increase pain. Past Medical History/Comorbidities:   Ms. Tigre Fuentes  has a past medical history of Anxiety (9/6/2013); Endometriosis; Gluten intolerance; Migraine; Nausea & vomiting; Ovarian cyst; PUD (peptic ulcer disease) (12/2013 ); Unspecified adverse effect of anesthesia; UTI (urinary tract infection); and Vaginal cuff dehiscence. Ms. Tigre Fuentes  has a past surgical history that includes hx hysteroscopy; pr endometrial cryoablation; hx other surgical; hx bilateral salpingectomy; hx other surgical (11/2016); hx total laparoscopic hysterectomy; hx breast augmentation (2003); hx gyn (2000, 2004); hx dilation and curettage; and hx pelvic laparoscopy (11/03/2016). Social History/Living Environment:     Pt lives in 2 [place] home with spouse and 3 children  Prior Level of Function/Work/Activity:  Works part time at [employer]    Current Medications:       Current Outpatient Prescriptions:     ALPRAZolam (XANAX) 0.25 mg tablet, Take 0.25 mg by mouth now., Disp: , Rfl:     UBIDECARENONE/VITAMIN E MIXED (COQ10  PO), Take  by mouth nightly., Disp: , Rfl:     VIT B2/NIACIN/B6/B12/DEXPANTH (B COMPLEX SL), by SubLINGual route daily. , Disp: , Rfl:     carisoprodol (SOMA) 250 mg tablet, Take 250 mg by mouth four (4) times daily. Pt takes only 1 x/d [facility] at [facility], Disp: , Rfl:     minocycline (MINOCIN, DYNACIN) 100 mg capsule, Take  by mouth every Monday, Wednesday, Friday. Pt takes twice a day on these days For antiinflammatory in connection with endometriosis, Disp: , Rfl:     traMADol (ULTRAM) 50 mg tablet, Take 1-2 Tabs by mouth every six (6) hours as needed for Pain.  Max Daily Amount: 400 mg., Disp: 60 Tab, Rfl: 0    clonazePAM (KLONOPIN) 0.5 mg tablet, Take  by mouth nightly as needed. , Disp: , Rfl:     amitriptyline (ELAVIL) 25 mg tablet, Take 1 Tab by mouth nightly. Indications: NEUROPATHIC PAIN, Disp: 30 Tab, Rfl: 11    gabapentin (NEURONTIN) 100 mg capsule, TAKE THREE CAPSULES BY MOUTH EVERY DAY (Patient taking differently: TAKE THREE CAPSULES BY MOUTH EVERY DAY at HS), Disp: 90 Cap, Rfl: 6    citalopram (CELEXA) 10 mg tablet, Take 20 mg by mouth every evening., Disp: , Rfl:     promethazine (PHENERGAN) 25 mg tablet, Take 25 mg by mouth every six (6) hours as needed for Nausea., Disp: , Rfl:     ibuprofen (MOTRIN) 800 mg tablet, Take 1 Tab by mouth every six (6) hours as needed. (Patient taking differently: Take 800 mg by mouth every six (6) hours as needed. Last dose 12/17/17), Disp: 30 Tab, Rfl: 0    magnesium oxide (MAG-OX) 400 mg tablet, Take 400 mg by mouth daily. , Disp: , Rfl:     melatonin 3 mg tablet, Take 5 mg by mouth nightly., Disp: , Rfl:     loratadine (CLARITIN) 10 mg tablet, Take 10 mg by mouth every evening., Disp: , Rfl:     LACTOBACILLUS ACIDOPHILUS (PROBIOTIC PO), Take  by mouth., Disp: , Rfl:     multivitamin (ONE A DAY) tablet, Take 1 Tab by mouth daily. , Disp: , Rfl:    Date Last Reviewed:  1/11/2018     Number of Personal Factors/Comorbidities that affect the Plan of Care: 1-2: MODERATE COMPLEXITY   EXAMINATION:   Palpation:          Adductors: Moderate connective tissue (CT) restrictions B, L>R with pain and wincing. Pudendal nerve glides significantly tender to palpation (TTP). Thoracic Spine: T10-12 refers pain towards vaginal cuff, however mobility is WNL. ROM:          Pelvic mobility: WNL and painfree. Lumbar Mobility: WNL, and painfree. Strength: Of PFM via SEMG: Resting tone generally 10-15 mV. Superficial PFM via vaginal canal: Power at least 3/5 with moderate delay in relaxation.  Transverse Abdominus (TA): Good with strong PFM co contraction       Body Structures Involved:  1. Muscles Body Functions Affected:  1. Reproductive  2. Neuromusculoskeletal  3. Movement Related Activities and Participation Affected:  1. General Tasks and Demands  2. Mobility  3. Self Care  4. Interpersonal Interactions and Relationships   Number of elements (examined above) that affect the Plan of Care: 3: MODERATE COMPLEXITY   CLINICAL PRESENTATION:   Presentation: Evolving clinical presentation with changing clinical characteristics: MODERATE COMPLEXITY   CLINICAL DECISION MAKING:   Outcome Measure: Tool Used: Pain Disability Index  Score:  Initial: 11/7/2017; 42 Most Recent: TBD   Interpretation of Score: The rating scale measures the degree to which aspects of your life are disrupted by chronic pain. For each of the 7 categories, patient circles the level of disability they experience 0 to 10. 0 signifies no disability at all, 10 signifies that these activities are totally disrupted by pain. Medical Necessity:   · Patient is expected to demonstrate progress in range of motion, coordination and functional technique to return to painfree ADL's, work activities and recreation. Reason for Services/Other Comments:  · Patient continues to require skilled intervention due to above mentioned deficits. Use of outcome tool(s) and clinical judgement create a POC that gives a: Questionable prediction of patient's progress: MODERATE COMPLEXITY            TREATMENT:   (In addition to Assessment/Re-Assessment sessions the following treatments were rendered)    Pre-treatment Symptoms/Complaints:   left to go out of town for work on Saturday, so has been managing her kids and home alone. Slight increase in pelvic pain, primarily left sided, this week.      Pain: Initial: Pain Intensity 1: 7/10 Post Session:  5/10 (very sore in proximal thighs)     THERAPEUTIC EXERCISE: (10 minutes):  Exercises per grid below to improve mobility and coordination. Required moderate verbal and tactile cues to promote proper performance of exercise. Progressed repetitions and complexity of movement as indicated. Date:  1/11/2018     Activity/Exercise Parameters   Pelvic Floor Drops Supine, w/SEMG; 6 minutes   Diaphragmatic Breathing 2 minutes   Prone Press Ups    Hip Flexor Stretch    Adductor stretch Performed B. Supine frog; 2 minutes   Home Exercise Program Drops, DKTC/Adductor Stretch, Pelvic Mobility, Dilators     MANUAL THERAPY: (45 minutes): Joint mobilization and Soft tissue mobilization was utilized and necessary because of the patient's painful spasm. (Used abbreviations: MET - muscle energy technique; SCS- Strain counter strain; CTM- Connective tissue mobilizations; CR- Contract/relax; SP- Sustained pressure). - SP utilized to superficial and deep PFM, as well as OI with 1 finger, followed by CTM and IASTM to B adductors, pudendal nerve glides B, gentle iliacus release, CTM to suprapubic region, and external PFM release. Ended session with trigger point dry needling performed to B adductor sami/longus and distal medial hamstring. MODALITIES: (10 minutes DURING INTERNAL RELEASE OF PFM): Moist hot pack utilized to decrease pain throughout abdominal wall, improve blood flow and increase tissue mobility. Treatment/Session Assessment:    · Response to Treatment:  Resting tone elevated this am, generally around 15 mV. But following external MT this was < 2 mV and after internal was WNL. Also, demonstrated increased tightness and tension in PFM with 1 finger treatment; however she continues to tolerate more MT than in the past. Discussed addition of yoga to HEP for mindfulness, mobility, and pain relief. · Compliance with Program/Exercises: Compliant with downtraining at this time. · Recommendations/Intent for next treatment session: \"Next visit will focus on internal,thoracic spine mobility\".     Total Treatment Duration: 55 minutes  PT Patient Time In/Time Out  Time In: 0810  Time Out: 6849 [facility], PT

## 2018-01-18 ENCOUNTER — APPOINTMENT (OUTPATIENT)
Dept: PHYSICAL THERAPY | Age: 33
End: 2018-01-18
Payer: COMMERCIAL

## 2018-01-24 ENCOUNTER — HOSPITAL ENCOUNTER (OUTPATIENT)
Dept: PHYSICAL THERAPY | Age: 33
Discharge: HOME | End: 2018-01-24
Payer: COMMERCIAL

## 2018-01-24 PROCEDURE — 97140 MANUAL THERAPY 1/> REGIONS: CPT

## 2018-01-24 PROCEDURE — 97110 THERAPEUTIC EXERCISES: CPT

## 2018-01-24 NOTE — PROGRESS NOTES
Akila Rojas  : 1985  Payor: [employer] / Plan: [place] BL[place] FEDER[place] / Product Type: PPO /  [facility] at [address] [place] [address], [address] [place] [address]  [phone]   [phone]          OUTPATIENT PHYSICAL THERAPY:Daily Note 2018    ICD-10: Treatment Diagnosis: Chronic pelvic pain (R10.2); spasm of muscle (M62.838); Pelvic floor dysfunction (M62.89)  Precautions/Allergies:   Codeine and Dermagraft [cult skin subst, human-bovine]   Fall Risk Score: 0 (? 5 = High Risk)  MD Orders: Evaluate and treat MEDICAL/REFERRING DIAGNOSIS:  Pelvic floor dysfunction [M62.89]   DATE OF ONSET: Chronic endometriosis since menstruation  REFERRING PHYSICIAN: Tonia Fleischer, *  RETURN PHYSICIAN APPOINTMENT: TBD     INITIAL ASSESSMENT:  Ms. Jt Reed presents with significant pelvic floor muscle (PFM) over activity and lack of coordination, as well as connective tissue restrictions and trigger points in her adductors, abdominal wall and posterior chain; this results in chronic pelvic pain. She also demonstrates weakness throughout her core musculature and pelvic girdle. This is contributing to her difficulty with all ADL's and physical activity. I believe she will benefit from skilled PT, with an emphasis on manual therapy and muscle retraining, strength, and coordination to improve her ability to perform housework, , work responsibilities and physical activity without pain or difficulty. She is pleasant, motivated and eager to return to her prior level of function. Progress may be imited, however, due to the chronic and severe nature of her pain. PROBLEM LIST (Impacting functional limitations):  1. Decreased Strength  2. Decreased ADL/Functional Activities  3. Increased Pain  4. Decreased Activity Tolerance INTERVENTIONS PLANNED:  1. Electrical Stimulation  2. Home Exercise Program (HEP)  3. Therapeutic Activites  4.  Therapeutic Exercise/Strengthening TREATMENT PLAN:  Effective Dates: 11/7/2017 TO 1/30/2018. Frequency/Duration: Once every two weeks for 12 weeks. GOALS: (Goals have been discussed and agreed upon with patient.)  Short-Term Functional Goals: Time Frame: 6 weeks  1. Pt will demonstrate N voluntary relaxation of PFM to improve her ability to urinate without hesitancy or incomplete emptying. 2. Pt will demonstrate I with basic PFM HEP to improve resting tone and decrease pain with prolonged sitting. (MET 1/4/2018)  Discharge Goals: Time Frame: 12 weeks  1. Pt will reports < 2/10 pain with 1 finger palpation of PFM and obturator internus (OI); to initiate pain free intercourse with spouse. 2. Pt will demonstrate appropriate co contraction of the Transversus Abdominus and Pelvic Floor muscle group (without excessive compensation), and maintain x 60 seconds to improve core stability and return to dynamic physical activity such as running and jumping. 3. Pt will tolerate dilator training with < 3/10 pain to initiate at home and improve tenderness in PFM to return to pain free sexual activity with spouse. Rehabilitation Potential For Stated Goals: Good                The information in this section was collected on 11/7/2017 (except where otherwise noted). HISTORY:   History of Present Injury/Illness (Reason for Referral):  Pt with a history of chronic endometriosis. This has resulted in > 10 surgeries to remove, including hysterectomy (has ovaries) and excision in November 2016. Pain comes and goes, but is always present. Recent worsening of pain in beginning of November. Urinary: Complains of hesitancy and incomplete emptying. Takes ~ 15-30 seconds to start stream. Denies UI or dysuria at this time,  Bowel: History of constipation managed with OTC medications. However, recently has been going daily with push/strain. Sexual: Sex has always been painful.  Currently, pain during is 6/10 (diffuse in the vagina) and 8/10 after (throughout the pelvic girdle). 10/10 if he \"hits my cuff\". Pelvic Organ Prolapse/Pelvic Pain: Daily pelvic pain reported in L LQ. Sharp, shooting down anterior thigh on L. Can radiate up to ribs. Best pain is 6/19o while on pain medication, worst 10/10. Nothing seems to make pain better other than meds, prolonged sitting or standing, as well as ADL's increase pain. Past Medical History/Comorbidities:   Ms. Jt Reed  has a past medical history of Anxiety (9/6/2013); Endometriosis; Gluten intolerance; Migraine; Nausea & vomiting; Ovarian cyst; PUD (peptic ulcer disease) (12/2013 ); Unspecified adverse effect of anesthesia; UTI (urinary tract infection); and Vaginal cuff dehiscence. Ms. Jt Reed  has a past surgical history that includes hx hysteroscopy; pr endometrial cryoablation; hx other surgical; hx bilateral salpingectomy; hx other surgical (11/2016); hx total laparoscopic hysterectomy; hx breast augmentation (2003); hx gyn (2000, 2004); hx dilation and curettage; and hx pelvic laparoscopy (11/03/2016). Social History/Living Environment:     Pt lives in [address] home with spouse and 3 children  Prior Level of Function/Work/Activity:  Works part time at [employer]    Current Medications:       Current Outpatient Prescriptions:     ALPRAZolam (XANAX) 0.25 mg tablet, Take 0.25 mg by mouth now., Disp: , Rfl:     UBIDECARENONE/VITAMIN E MIXED (COQ10  PO), Take  by mouth nightly., Disp: , Rfl:     VIT B2/NIACIN/B6/B12/DEXPANTH (B COMPLEX SL), by SubLINGual route daily. , Disp: , Rfl:     carisoprodol (SOMA) 250 mg tablet, Take 250 mg by mouth four (4) times daily. Pt takes only 1 x/d at most at [facility], Disp: , Rfl:     minocycline (MINOCIN, DYNACIN) 100 mg capsule, Take  by mouth every Monday, Wednesday, Friday. Pt takes twice a day on these days For antiinflammatory in connection with endometriosis, Disp: , Rfl:     traMADol (ULTRAM) 50 mg tablet, Take 1-2 Tabs by mouth every six (6) hours as needed for Pain.  Max Daily Amount: 400 mg., Disp: 60 Tab, Rfl: 0    clonazePAM (KLONOPIN) 0.5 mg tablet, Take  by mouth nightly as needed. , Disp: , Rfl:     amitriptyline (ELAVIL) 25 mg tablet, Take 1 Tab by mouth nightly. Indications: NEUROPATHIC PAIN, Disp: 30 Tab, Rfl: 11    gabapentin (NEURONTIN) 100 mg capsule, TAKE THREE CAPSULES BY MOUTH EVERY DAY (Patient taking differently: TAKE THREE CAPSULES BY MOUTH EVERY DAY at HS), Disp: 90 Cap, Rfl: 6    citalopram (CELEXA) 10 mg tablet, Take 20 mg by mouth every evening., Disp: , Rfl:     promethazine (PHENERGAN) 25 mg tablet, Take 25 mg by mouth every six (6) hours as needed for Nausea., Disp: , Rfl:     ibuprofen (MOTRIN) 800 mg tablet, Take 1 Tab by mouth every six (6) hours as needed. (Patient taking differently: Take 800 mg by mouth every six (6) hours as needed. Last dose 12/17/17), Disp: 30 Tab, Rfl: 0    magnesium oxide (MAG-OX) 400 mg tablet, Take 400 mg by mouth daily. , Disp: , Rfl:     melatonin 3 mg tablet, Take 5 mg by mouth nightly., Disp: , Rfl:     loratadine (CLARITIN) 10 mg tablet, Take 10 mg by mouth every evening., Disp: , Rfl:     LACTOBACILLUS ACIDOPHILUS (PROBIOTIC PO), Take  by mouth., Disp: , Rfl:     multivitamin (ONE A DAY) tablet, Take 1 Tab by mouth daily. , Disp: , Rfl:    Date Last Reviewed:  1/24/2018     Number of Personal Factors/Comorbidities that affect the Plan of Care: 1-2: MODERATE COMPLEXITY   EXAMINATION:   Palpation:          Adductors: Moderate connective tissue (CT) restrictions B, L>R with pain and wincing. Pudendal nerve glides significantly tender to palpation (TTP). Thoracic Spine: T10-12 refers pain towards vaginal cuff, however mobility is WNL. ROM:          Pelvic mobility: WNL and painfree. Lumbar Mobility: WNL, and painfree. Strength: Of PFM via SEMG: Resting tone generally 10-15 mV. Superficial PFM via vaginal canal: Power at least 3/5 with moderate delay in relaxation.  Transverse Abdominus (TA): Good with strong PFM co contraction       Body Structures Involved:  1. Muscles Body Functions Affected:  1. Reproductive  2. Neuromusculoskeletal  3. Movement Related Activities and Participation Affected:  1. General Tasks and Demands  2. Mobility  3. Self Care  4. Interpersonal Interactions and Relationships   Number of elements (examined above) that affect the Plan of Care: 3: MODERATE COMPLEXITY   CLINICAL PRESENTATION:   Presentation: Evolving clinical presentation with changing clinical characteristics: MODERATE COMPLEXITY   CLINICAL DECISION MAKING:   Outcome Measure: Tool Used: Pain Disability Index  Score:  Initial: 11/7/2017; 42 Most Recent: TBD   Interpretation of Score: The rating scale measures the degree to which aspects of your life are disrupted by chronic pain. For each of the 7 categories, patient circles the level of disability they experience 0 to 10. 0 signifies no disability at all, 10 signifies that these activities are totally disrupted by pain. Medical Necessity:   · Patient is expected to demonstrate progress in range of motion, coordination and functional technique to return to painfree ADL's, work activities and recreation. Reason for Services/Other Comments:  · Patient continues to require skilled intervention due to above mentioned deficits. Use of outcome tool(s) and clinical judgement create a POC that gives a: Questionable prediction of patient's progress: MODERATE COMPLEXITY            TREATMENT:   (In addition to Assessment/Re-Assessment sessions the following treatments were rendered)    Pre-treatment Symptoms/Complaints:  Traveled to visit  and walked a good bit in [place], this increased pain (even used a wheelchair at [facility]), but worse was having to drive 9 hours to return home. Pain has been much worse. Slight improvements (8 to a 7) after having a BM.      Pain: Initial: Pain Intensity 1: 7/10 Post Session:  5/10     THERAPEUTIC EXERCISE: (10 minutes): Exercises per grid below to improve mobility and coordination. Required moderate verbal and tactile cues to promote proper performance of exercise. Progressed repetitions and complexity of movement as indicated. Date:  1/24/2018     Activity/Exercise Parameters   Pelvic Floor Drops Supine; 4 minutes   Diaphragmatic Breathing 2 minutes   Prone Press Ups    Hip Flexor Stretch 2 minutes   Adductor stretch Performed B. Supine frog; 2 minutes   Home Exercise Program Drops, DKTC/Adductor Stretch, Pelvic Mobility, Dilators     MANUAL THERAPY: (45 minutes): Joint mobilization and Soft tissue mobilization was utilized and necessary because of the patient's painful spasm. (Used abbreviations: MET - muscle energy technique; SCS- Strain counter strain; CTM- Connective tissue mobilizations; CR- Contract/relax; SP- Sustained pressure). - SP utilized to superficial and deep PFM, as well as OI with 1 finger, followed by CTM and IASTM to B adductors, pudendal nerve glides B, gentle iliacus release, CTM to suprapubic region, and external PFM release. Ended session with trigger point dry needling performed to B adductor sami/longus, distal medial hamstring, and lumbar multifidus. MODALITIES: (10 minutes DURING INTERNAL RELEASE OF PFM): Moist hot pack utilized to decrease pain throughout abdominal wall, improve blood flow and increase tissue mobility. Treatment/Session Assessment:    · Response to Treatment:  Due to increased pain, emphasis on down training and MT throughout pelvic girdle. Significant increase in pain with internal palpation of PFM, minimal improvements with SCS. Patient did inform PT that she will undergo another excision surgery on April 9th/. MD rec. Continued PT until surgery. · Compliance with Program/Exercises: Compliant with downtraining at this time. · Recommendations/Intent for next treatment session: \"Next visit will focus on internal,thoracic spine mobility\".     Total Treatment Duration: 55 minutes  PT Patient Time In/Time Out  Time In: 1230  Time Out: [facility] [place],

## 2018-01-31 ENCOUNTER — APPOINTMENT (OUTPATIENT)
Dept: PHYSICAL THERAPY | Age: 33
End: 2018-01-31
Payer: COMMERCIAL

## 2018-02-08 ENCOUNTER — HOSPITAL ENCOUNTER (OUTPATIENT)
Dept: PHYSICAL THERAPY | Age: 33
Discharge: HOME | End: 2018-02-08
Payer: COMMERCIAL

## 2018-02-08 PROCEDURE — 97140 MANUAL THERAPY 1/> REGIONS: CPT

## 2018-02-08 PROCEDURE — 97110 THERAPEUTIC EXERCISES: CPT

## 2018-02-08 NOTE — THERAPY RECERTIFICATION
Alize Vaca Bob  : 1985  Payor: Yarelis Aguirre / Plan: [facility] FEDERAL / Product Type: PPO /  [address]  at [address]  [phone]   [phone]          OUTPATIENT PHYSICAL THERAPY:Daily Note, Progress Report and Recertification [id number]    ICD-10: Treatment Diagnosis: Chronic pelvic pain (R10.2); spasm of muscle (M62.838); Pelvic floor dysfunction (M62.89)  Precautions/Allergies:   Codeine and Dermagraft [cult skin subst, human-bovine]   Fall Risk Score: 0 (? 5 = High Risk)  MD Orders: Evaluate and treat MEDICAL/REFERRING DIAGNOSIS:  Pelvic floor dysfunction [M62.89]   DATE OF ONSET: Chronic endometriosis since menstruation  REFERRING PHYSICIAN: Orin Jean, *  RETURN PHYSICIAN APPOINTMENT: TBD     INITIAL ASSESSMENT:  Ms. Julianne Hayes presents with significant pelvic floor muscle (PFM) over activity and lack of coordination, as well as connective tissue restrictions and trigger points in her adductors, abdominal wall and posterior chain; this results in chronic pelvic pain. She also demonstrates weakness throughout her core musculature and pelvic girdle. This is contributing to her difficulty with all ADL's and physical activity. I believe she will benefit from skilled PT, with an emphasis on manual therapy and muscle retraining, strength, and coordination to improve her ability to perform housework, , work responsibilities and physical activity without pain or difficulty. She is pleasant, motivated and eager to return to her prior level of function. Progress may be imited, however, due to the chronic and severe nature of her pain. RE-CERTIFICATION, PROGRESS NOTE 2018: Jerald Rucker has been seen in PT for 9 sessions since 2017. Treatment has emphasized manual therapy throughout the pelvic girdle, both internally and externally, as well as PFM down training and sexual and bowel/bladder health education. She is able to tolerate more internal PFM physical therapy, but continues to report significant pain at levator ani and obturator internus B. All PFM on L reproduce/radiate into her L lower quadrant. She receives temporary relief in her pelvic pain following PT. However, her surgeons believe that she is having a re-occurrence of endometriosis and she is schedule to undergo surgery on April 4, 2018 for excision of endometriosis and removal of her L ovary. Because of this, I believe she will continue to benefit from skilled PT to manage her pain and prepare her body and musculature for surgery. She is pleasant and motivated and using dilators at home. PROBLEM LIST (Impacting functional limitations):  1. Decreased Strength  2. Decreased ADL/Functional Activities  3. Increased Pain  4. Decreased Activity Tolerance INTERVENTIONS PLANNED:  1. Electrical Stimulation  2. Home Exercise Program (HEP)  3. Therapeutic Activites  4. Therapeutic Exercise/Strengthening   TREATMENT PLAN:  Effective Dates: 2/8/2018 TO 5/3/2018 . Frequency/Duration: Once every two weeks for 12 weeks. GOALS: (Goals have been discussed and agreed upon with patient.)    Short-Term Functional Goals: Time Frame: 6 weeks  1. Pt will demonstrate N voluntary relaxation of PFM to improve her ability to urinate without hesitancy or incomplete emptying. (ONGOING)  2. Pt will demonstrate I with basic PFM HEP to improve resting tone and decrease pain with prolonged sitting. (MET 1/4/2018)    Discharge Goals: Time Frame: 12 weeks  1. Pt will reports < 2/10 pain with 1 finger palpation of PFM and obturator internus (OI); to initiate pain free intercourse with spouse. (ONGOING)  2. Pt will demonstrate appropriate co contraction of the Transversus Abdominus and Pelvic Floor muscle group (without excessive compensation), and maintain x 60 seconds to improve core stability and return to dynamic physical activity such as running and jumping. (ONGOING)  3.  Pt will tolerate dilator training with < 3/10 pain to initiate at home and improve tenderness in PFM to return to pain free sexual activity with spouse. (ONGOING)    Rehabilitation Potential For Stated Goals: Good      Regarding chris carlton's physical therapy, I agree with the above stated plan of care to be performed by a skilled physical therapist.    ____________________________________kimberly pope md  _____________________________________date                The information in this section was collected on 11/7/2017 (except where otherwise noted). HISTORY:   History of Present Injury/Illness (Reason for Referral):  Pt with a history of chronic endometriosis. This has resulted in > 10 surgeries to remove, including hysterectomy (has ovaries) and excision in November 2016. Pain comes and goes, but is always present. Recent worsening of pain in beginning of November. Urinary: Complains of hesitancy and incomplete emptying. Takes ~ 15-30 seconds to start stream. Denies UI or dysuria at this time,  Bowel: History of constipation managed with OTC medications. However, recently has been going daily with push/strain. Sexual: Sex has always been painful. Currently, pain during is 6/10 (diffuse in the vagina) and 8/10 after (throughout the pelvic girdle). 10/10 if he \"hits my cuff\". Pelvic Organ Prolapse/Pelvic Pain: Daily pelvic pain reported in L LQ. Sharp, shooting down anterior thigh on L. Can radiate up to ribs. Best pain is 6/10 while on pain medication, worst 10/10. Nothing seems to make pain better other than meds, prolonged sitting or standing, as well as ADL's increase pain. RE-CERTIFICATION/PROGRESS NOTE 2/8/2018: Urinary hesitancy has persisted since starting PT. Bowel function continues to be limited, and requires OTC medication and push/strain to empty. [place] has improved slightly since returning to PT.  Pain is present constantly, but severity has decreased to a 5/10 and the length of pain following PT has decreased to < 1 day. Daily pelvic pain is present. Relief in severity to < 5/10 following PT (for a few days), then returns to 7/10 (consistently) in the L lower quadrant. Patient states pain feels like \"ovary pain\". Past Medical History/Comorbidities:   Ms. Amador Walton  has a past medical history of Anxiety (9/6/2013); Endometriosis; Gluten intolerance; Migraine; Nausea & vomiting; Ovarian cyst; PUD (peptic ulcer disease) (12/2013 ); Unspecified adverse effect of anesthesia; UTI (urinary tract infection); and Vaginal cuff dehiscence. Ms. Amador Walton  has a past surgical history that includes hx hysteroscopy; pr endometrial cryoablation; hx other surgical; hx bilateral salpingectomy; hx other surgical (11/2016); hx total laparoscopic hysterectomy; hx breast augmentation (2003); hx gyn (2000, 2004); hx dilation and curettage; and hx pelvic laparoscopy (11/03/2016). Social History/Living Environment:     Pt lives in [address] with spouse and 3 children    Prior Level of Function/Work/Activity:  Works part time at [employer]    Current Medications:       Current Outpatient Prescriptions:     ALPRAZolam (XANAX) 0.25 mg tablet, Take 0.25 mg by mouth now., Disp: , Rfl:     UBIDECARENONE/VITAMIN E MIXED (COQ10  PO), Take  by mouth nightly., Disp: , Rfl:     VIT B2/NIACIN/B6/B12/DEXPANTH (B COMPLEX SL), by SubLINGual route daily. , Disp: , Rfl:     carisoprodol (SOMA) 250 mg tablet, Take 250 mg by mouth four (4) times daily. Pt takes only 1 x/d at [facility] at [facility], Disp: , Rfl:     minocycline (MINOCIN, DYNACIN) 100 mg capsule, Take  by mouth every Monday, Wednesday, Friday. Pt takes twice a day on these days For antiinflammatory in connection with endometriosis, Disp: , Rfl:     traMADol (ULTRAM) 50 mg tablet, Take 1-2 Tabs by mouth every six (6) hours as needed for Pain. Max Daily Amount: 400 mg., Disp: 60 Tab, Rfl: 0    clonazePAM (KLONOPIN) 0.5 mg tablet, Take  by mouth nightly as needed. , Disp: , Rfl:     amitriptyline (ELAVIL) 25 mg tablet, Take 1 Tab by mouth nightly. Indications: NEUROPATHIC PAIN, Disp: 30 Tab, Rfl: 11    gabapentin (NEURONTIN) 100 mg capsule, TAKE THREE CAPSULES BY MOUTH EVERY DAY (Patient taking differently: TAKE THREE CAPSULES BY MOUTH EVERY DAY at HS), Disp: 90 Cap, Rfl: 6    citalopram (CELEXA) 10 mg tablet, Take 20 mg by mouth every evening., Disp: , Rfl:     promethazine (PHENERGAN) 25 mg tablet, Take 25 mg by mouth every six (6) hours as needed for Nausea., Disp: , Rfl:     ibuprofen (MOTRIN) 800 mg tablet, Take 1 Tab by mouth every six (6) hours as needed. (Patient taking differently: Take 800 mg by mouth every six (6) hours as needed. Last dose 12/17/17), Disp: 30 Tab, Rfl: 0    magnesium oxide (MAG-OX) 400 mg tablet, Take 400 mg by mouth daily. , Disp: , Rfl:     melatonin 3 mg tablet, Take 5 mg by mouth nightly., Disp: , Rfl:     loratadine (CLARITIN) 10 mg tablet, Take 10 mg by mouth every evening., Disp: , Rfl:     LACTOBACILLUS ACIDOPHILUS (PROBIOTIC PO), Take  by mouth., Disp: , Rfl:     multivitamin (ONE A DAY) tablet, Take 1 Tab by mouth daily. , Disp: , Rfl:    Date Last Reviewed:  2/8/2018     Number of Personal Factors/Comorbidities that affect the Plan of Care: 1-2: MODERATE COMPLEXITY   EXAMINATION:   UPDATED 2/8/2018:  Palpation:          Adductors: MILD connective tissue (CT) restrictions ON R, MODERATE ON L with pain and wincing on L. Pudendal nerve glides significantly tender to palpation (TTP) on L, moderately painful on         R. Thoracic Spine: T10-12 refers pain towards vaginal cuff, however mobility is WNL. PFM via vaginal canal: L superficial PFM non tender locally, but with sustained pressure refer towards L lower quadrant. All levator ani and OI TTP B, with severe pain reported diffusely throughout       vagina, into LB and L LQ.  ROM:          Pelvic mobility: WNL and painfree.         Lumbar Mobility: WNL and painfree. Strength: Of PFM via SEMG: Resting tone generally 10 mV, but with MT and drops patient is able to achieve < 1.2 mV. Superficial PFM via vaginal canal: Power at least 3/5 with moderate delay in relaxation. Transverse Abdominus (TA): Good with strong PFM co contraction       Body Structures Involved:  1. Muscles Body Functions Affected:  1. Reproductive  2. Neuromusculoskeletal  3. Movement Related Activities and Participation Affected:  1. General Tasks and Demands  2. Mobility  3. Self Care  4. Interpersonal Interactions and Relationships   Number of elements (examined above) that affect the Plan of Care: 3: MODERATE COMPLEXITY   CLINICAL PRESENTATION:   Presentation: Evolving clinical presentation with changing clinical characteristics: MODERATE COMPLEXITY   CLINICAL DECISION MAKING:   Outcome Measure: Tool Used: Pain Disability Index  Score:  Initial: 11/7/2017; 42 Most Recent: TBD   Interpretation of Score: The rating scale measures the degree to which aspects of your life are disrupted by chronic pain. For each of the 7 categories, patient circles the level of disability they experience 0 to 10. 0 signifies no disability at all, 10 signifies that these activities are totally disrupted by pain. Medical Necessity:   · Patient is expected to demonstrate progress in range of motion, coordination and functional technique to return to painfree ADL's, work activities and recreation. Reason for Services/Other Comments:  · Patient continues to require skilled intervention due to above mentioned deficits. Use of outcome tool(s) and clinical judgement create a POC that gives a: Questionable prediction of patient's progress: MODERATE COMPLEXITY            TREATMENT:   (In addition to Assessment/Re-Assessment sessions the following treatments were rendered)    Pre-treatment Symptoms/Complaints:  Managing pain with medication and gentle down training.  However, yesterday was a very bad day requiring cancellation of appointments and work obligations. Pain: Initial: Pain Intensity 1: 6/10 Post Session:  5/10     THERAPEUTIC EXERCISE: (10 minutes):  Exercises per grid below to improve mobility and coordination. Required moderate verbal and tactile cues to promote proper performance of exercise. Progressed repetitions and complexity of movement as indicated. Date:  2/8/2018     Activity/Exercise Parameters   Pelvic Floor Drops Supine; 2 minutes   Diaphragmatic Breathing 2 minutes   Prone Press Ups 2 minutes   Cat/Camel 2 minutes   Adductor stretch Performed B. Supine frog; 2 minutes   Home Exercise Program Drops, DKTC/Adductor Stretch, Pelvic Mobility, Dilators     MANUAL THERAPY: (55 minutes): Joint mobilization and Soft tissue mobilization was utilized and necessary because of the patient's painful spasm. (Used abbreviations: MET - muscle energy technique; SCS- Strain counter strain; CTM- Connective tissue mobilizations; CR- Contract/relax; SP- Sustained pressure). - SP utilized to superficial and deep PFM, as well as OI with 1 finger  - CTM and IASTM to B adductors and ischiorectal fossa  - Pudendal nerve glides B  - Gentle iliacus release B   - CTM to suprapubic region  - Trigger point dry needling performed to B adductor sami/longus, distal medial hamstring, and lumbar multifidus. MODALITIES: (10 minutes DURING INTERNAL RELEASE OF PFM; Does not contribute to total treatment time): Moist hot pack utilized to decrease pain throughout abdominal wall, improve blood flow and increase tissue mobility. Treatment/Session Assessment:    · Response to Treatment:  Patient continues to be a good candidate for PT and MD rec. continued PT until surgery. · Compliance with Program/Exercises: Compliant with downtraining at this time. · Recommendations/Intent for next treatment session: \"Next visit will focus on internal,thoracic spine mobility\".  COMPLETE [place]    Total Treatment Duration: 65 minutes, 5 minutes regla/doff clothing  PT Patient Time In/Time Out  Time In: 1000  Time Out: [facility], PT

## 2018-02-14 ENCOUNTER — HOSPITAL ENCOUNTER (OUTPATIENT)
Dept: PHYSICAL THERAPY | Age: 33
Discharge: HOME | End: 2018-02-14
Payer: COMMERCIAL

## 2018-02-14 PROCEDURE — 97140 MANUAL THERAPY 1/> REGIONS: CPT

## 2018-02-14 NOTE — PROGRESS NOTES
Robyn Rojas  : 1985  Payor: Kerline Duke / Plan: [place] BL[place] FED[place] / Product Type: PPO /  [facility] at [address] [place] [address], [address] [place] [address]  [phone]   [phone]          OUTPATIENT PHYSICAL THERAPY:Daily Note 2018    ICD-10: Treatment Diagnosis: Chronic pelvic pain (R10.2); spasm of muscle (M62.838); Pelvic floor dysfunction (M62.89)  Precautions/Allergies:   Codeine and Dermagraft [cult skin subst, human-bovine]   Fall Risk Score: 0 (? 5 = High Risk)  MD Orders: Evaluate and treat MEDICAL/REFERRING DIAGNOSIS:  Pelvic floor dysfunction [M62.89]   DATE OF ONSET: Chronic endometriosis since menstruation  REFERRING PHYSICIAN: Waylon Oneal, *  RETURN PHYSICIAN APPOINTMENT: TBD     INITIAL ASSESSMENT:  Ms. Dennis Mooney presents with significant pelvic floor muscle (PFM) over activity and lack of coordination, as well as connective tissue restrictions and trigger points in her adductors, abdominal wall and posterior chain; this results in chronic pelvic pain. She also demonstrates weakness throughout her core musculature and pelvic girdle. This is contributing to her difficulty with all ADL's and physical activity. I believe she will benefit from skilled PT, with an emphasis on manual therapy and muscle retraining, strength, and coordination to improve her ability to perform housework, , work responsibilities and physical activity without pain or difficulty. She is pleasant, motivated and eager to return to her prior level of function. Progress may be imited, however, due to the chronic and severe nature of her pain. RE-CERTIFICATION, PROGRESS NOTE 2018: Areli Bullard has been seen in PT for 9 sessions since 2017. Treatment has emphasized manual therapy throughout the pelvic girdle, both internally and externally, as well as PFM down training and sexual and bowel/bladder health education.  She is able to tolerate more internal PFM physical therapy, but continues to report significant pain at levator ani and obturator internus B. All PFM on L reproduce/radiate into her L lower quadrant. She receives temporary relief in her pelvic pain following PT. However, her surgeons believe that she is having a re-occurrence of endometriosis and she is schedule to undergo surgery on April 4, 2018 for excision of endometriosis and removal of her L ovary. Because of this, I believe she will continue to benefit from skilled PT to manage her pain and prepare her body and musculature for surgery. She is pleasant and motivated and using dilators at home. PROBLEM LIST (Impacting functional limitations):  1. Decreased Strength  2. Decreased ADL/Functional Activities  3. Increased Pain  4. Decreased Activity Tolerance INTERVENTIONS PLANNED:  1. Electrical Stimulation  2. Home Exercise Program (HEP)  3. Therapeutic Activites  4. Therapeutic Exercise/Strengthening   TREATMENT PLAN:  Effective Dates: 2/8/2018 TO 5/3/2018 . Frequency/Duration: Once every two weeks for 12 weeks. GOALS: (Goals have been discussed and agreed upon with patient.)    Short-Term Functional Goals: Time Frame: 6 weeks  1. Pt will demonstrate N voluntary relaxation of PFM to improve her ability to urinate without hesitancy or incomplete emptying. (ONGOING)  2. Pt will demonstrate I with basic PFM HEP to improve resting tone and decrease pain with prolonged sitting. (MET 1/4/2018)    Discharge Goals: Time Frame: 12 weeks  1. Pt will reports < 2/10 pain with 1 finger palpation of PFM and obturator internus (OI); to initiate pain free intercourse with spouse. (ONGOING)  2. Pt will demonstrate appropriate co contraction of the Transversus Abdominus and Pelvic Floor muscle group (without excessive compensation), and maintain x 60 seconds to improve core stability and return to dynamic physical activity such as running and jumping. (ONGOING)  3.  Pt will tolerate dilator training with < 3/10 pain to initiate at home and improve tenderness in PFM to return to pain free sexual activity with spouse. (ONGOING)    Rehabilitation Potential For Stated Goals: Good      Regarding [facility], I agree with the above stated plan of care to be performed by a skilled physical therapist.    ____________________________________kimberly pope md  _____________________________________date                The information in this section was collected on 11/7/2017 (except where otherwise noted). HISTORY:   History of Present Injury/Illness (Reason for Referral):  Pt with a history of chronic endometriosis. This has resulted in > 10 surgeries to remove, including hysterectomy (has ovaries) and excision in November 2016. Pain comes and goes, but is always present. Recent worsening of pain in beginning of November. Urinary: Complains of hesitancy and incomplete emptying. Takes ~ 15-30 seconds to start stream. Denies UI or dysuria at this time,  Bowel: History of constipation managed with OTC medications. However, recently has been going daily with push/strain. Sexual: Sex has always been painful. Currently, pain during is 6/10 (diffuse in the vagina) and 8/10 after (throughout the pelvic girdle). 10/10 if he \"hits my cuff\". Pelvic Organ Prolapse/Pelvic Pain: Daily pelvic pain reported in L LQ. Sharp, shooting down anterior thigh on L. Can radiate up to ribs. Best pain is 6/10 while on pain medication, worst 10/10. Nothing seems to make pain better other than meds, prolonged sitting or standing, as well as ADL's increase pain. RE-CERTIFICATION/PROGRESS NOTE 2/8/2018: Urinary hesitancy has persisted since starting PT. Bowel function continues to be limited, and requires OTC medication and push/strain to empty. [place] has improved slightly since returning to PT. Pain is present constantly, but severity has decreased to a 5/10 and the length of pain following PT has decreased to < 1 day. Daily pelvic pain is present. Relief in severity to < 5/10 following PT (for a few days), then returns to 7/10 (consistently) in the L lower quadrant. Patient states pain feels like \"ovary pain\". Past Medical History/Comorbidities:   Ms. Carmencita Carey  has a past medical history of Anxiety (9/6/2013); Endometriosis; Gluten intolerance; Migraine; Nausea & vomiting; Ovarian cyst; PUD (peptic ulcer disease) (12/2013 ); Unspecified adverse effect of anesthesia; UTI (urinary tract infection); and Vaginal cuff dehiscence. Ms. Carmencita Carey  has a past surgical history that includes hx hysteroscopy; pr endometrial cryoablation; hx other surgical; hx bilateral salpingectomy; hx other surgical (11/2016); hx total laparoscopic hysterectomy; hx breast augmentation (2003); hx gyn (2000, 2004); hx dilation and curettage; and hx pelvic laparoscopy (11/03/2016). Social History/Living Environment:     Pt lives in 2 story home with spouse and 3 children    Prior Level of Function/Work/Activity:  Works part time at [employer]    Current Medications:       Current Outpatient Prescriptions:     ALPRAZolam (XANAX) 0.25 mg tablet, Take 0.25 mg by mouth now., Disp: , Rfl:     UBIDECARENONE/VITAMIN E MIXED (COQ10  PO), Take  by mouth nightly., Disp: , Rfl:     VIT B2/NIACIN/B6/B12/DEXPANTH (B COMPLEX SL), by SubLINGual route daily. , Disp: , Rfl:     carisoprodol (SOMA) 250 mg tablet, Take 250 mg by mouth four (4) times daily. Pt takes only 1 x/d [facility] at [facility], Disp: , Rfl:     minocycline (MINOCIN, DYNACIN) 100 mg capsule, Take  by mouth every Monday, Wednesday, Friday. Pt takes twice a day on these days For antiinflammatory in connection with endometriosis, Disp: , Rfl:     traMADol (ULTRAM) 50 mg tablet, Take 1-2 Tabs by mouth every six (6) hours as needed for Pain. Max Daily Amount: 400 mg., Disp: 60 Tab, Rfl: 0    clonazePAM (KLONOPIN) 0.5 mg tablet, Take  by mouth nightly as needed. , Disp: , Rfl:     amitriptyline (ELAVIL) 25 mg tablet, Take 1 Tab by mouth nightly. Indications: NEUROPATHIC PAIN, Disp: 30 Tab, Rfl: 11    gabapentin (NEURONTIN) 100 mg capsule, TAKE THREE CAPSULES BY MOUTH EVERY DAY (Patient taking differently: TAKE THREE CAPSULES BY MOUTH EVERY DAY at HS), Disp: 90 Cap, Rfl: 6    citalopram (CELEXA) 10 mg tablet, Take 20 mg by mouth every evening., Disp: , Rfl:     promethazine (PHENERGAN) 25 mg tablet, Take 25 mg by mouth every six (6) hours as needed for Nausea., Disp: , Rfl:     ibuprofen (MOTRIN) 800 mg tablet, Take 1 Tab by mouth every six (6) hours as needed. (Patient taking differently: Take 800 mg by mouth every six (6) hours as needed. Last dose 12/17/17), Disp: 30 Tab, Rfl: 0    magnesium oxide (MAG-OX) 400 mg tablet, Take 400 mg by mouth daily. , Disp: , Rfl:     melatonin 3 mg tablet, Take 5 mg by mouth nightly., Disp: , Rfl:     loratadine (CLARITIN) 10 mg tablet, Take 10 mg by mouth every evening., Disp: , Rfl:     LACTOBACILLUS ACIDOPHILUS (PROBIOTIC PO), Take  by mouth., Disp: , Rfl:     multivitamin (ONE A DAY) tablet, Take 1 Tab by mouth daily. , Disp: , Rfl:    Date Last Reviewed:  2/14/2018     Number of Personal Factors/Comorbidities that affect the Plan of Care: 1-2: MODERATE COMPLEXITY   EXAMINATION:   UPDATED 2/8/2018:  Palpation:          Adductors: MILD connective tissue (CT) restrictions ON R, MODERATE ON L with pain and wincing on L. Pudendal nerve glides significantly tender to palpation (TTP) on L, moderately painful on         R. Thoracic Spine: T10-12 refers pain towards vaginal cuff, however mobility is WNL. PFM via vaginal canal: L superficial PFM non tender locally, but with sustained pressure refer towards L lower quadrant. All levator ani and OI TTP B, with severe pain reported diffusely throughout       vagina, into LB and L LQ.  ROM:          Pelvic mobility: WNL and painfree. Lumbar Mobility: WNL and painfree. Strength:           Of PFM via SEMG: Resting tone generally 10 mV, but with MT and drops patient is able to achieve < 1.2 mV. Superficial PFM via vaginal canal: Power at least 3/5 with moderate delay in relaxation. Transverse Abdominus (TA): Good with strong PFM co contraction       Body Structures Involved:  1. Muscles Body Functions Affected:  1. Reproductive  2. Neuromusculoskeletal  3. Movement Related Activities and Participation Affected:  1. General Tasks and Demands  2. Mobility  3. Self Care  4. Interpersonal Interactions and Relationships   Number of elements (examined above) that affect the Plan of Care: 3: MODERATE COMPLEXITY   CLINICAL PRESENTATION:   Presentation: Evolving clinical presentation with changing clinical characteristics: MODERATE COMPLEXITY   CLINICAL DECISION MAKING:   Outcome Measure: Tool Used: Pain Disability Index  Score:  Initial: 11/7/2017; 42 Most Recent: TBD   Interpretation of Score: The rating scale measures the degree to which aspects of your life are disrupted by chronic pain. For each of the 7 categories, patient circles the level of disability they experience 0 to 10. 0 signifies no disability at all, 10 signifies that these activities are totally disrupted by pain. Medical Necessity:   · Patient is expected to demonstrate progress in range of motion, coordination and functional technique to return to painfree ADL's, work activities and recreation. Reason for Services/Other Comments:  · Patient continues to require skilled intervention due to above mentioned deficits. Use of outcome tool(s) and clinical judgement create a POC that gives a: Questionable prediction of patient's progress: MODERATE COMPLEXITY            TREATMENT:   (In addition to Assessment/Re-Assessment sessions the following treatments were rendered)    Pre-treatment Symptoms/Complaints:  Significant increase in L lower quadrant, LBP, and pelvic girdle pain over the past few days.  Thinks life is just catching up with her and she is in a flare. Pain: Initial: Pain Intensity 1: 8/10 Post Session:  6/10     THERAPEUTIC EXERCISE: (5 minutes):  Exercises per grid below to improve mobility and coordination. Required moderate verbal and tactile cues to promote proper performance of exercise. Progressed repetitions and complexity of movement as indicated. Date:  2/14/2018     Activity/Exercise Parameters   Pelvic Floor Drops Supine; 2 minutes   Diaphragmatic Breathing 2 minutes   Prone Press Ups    Cat/Camel    Adductor stretch Performed B. Supine frog; 1 minute   Home Exercise Program Drops, DKTC/Adductor Stretch, Pelvic Mobility, Dilators     MANUAL THERAPY: (40 minutes): Joint mobilization and Soft tissue mobilization was utilized and necessary because of the patient's painful spasm. (Used abbreviations: MET - muscle energy technique; SCS- Strain counter strain; CTM- Connective tissue mobilizations; CR- Contract/relax; SP- Sustained pressure). - CTM and IASTM to B adductors and ischiorectal fossa  - Pudendal nerve glides B  - Gentle iliacus release B   - CTM to suprapubic region  - Trigger point dry needling performed to B adductor sami/longus, distal medial hamstring, and rectus abdominus    MODALITIES: (10 minutes DURING ADDUCTOR CTM/IASTM; Does not contribute to total treatment time): Moist hot pack utilized to decrease pain throughout abdominal wall, improve blood flow and increase tissue mobility. Treatment/Session Assessment:    · Response to Treatment:  Gentle CTM and MT performed this date due to increased pain. Demonstrated increased tightness and tenderness at abdominal wall and L proximal adductor group. Decreased pain when leaving PT and instructed to continue drops and down training at home. · Compliance with Program/Exercises: Compliant with downtraining at this time. · Recommendations/Intent for next treatment session: \"Next visit will focus on internal,thoracic spine mobility\".  COMPLETE Bubba    Total Treatment Duration: 45 MINUTES  PT Patient Time In/Time Out  Time In: 1100  Time Out: MALIA Rees

## 2018-02-22 ENCOUNTER — HOSPITAL ENCOUNTER (OUTPATIENT)
Dept: PHYSICAL THERAPY | Age: 33
Discharge: HOME | End: 2018-02-22
Payer: COMMERCIAL

## 2018-02-22 PROCEDURE — 97140 MANUAL THERAPY 1/> REGIONS: CPT

## 2018-02-22 NOTE — PROGRESS NOTES
Erickson Rojas  : 1985  Payor: Elissa Mckinley / Plan: [place] BLUE [place] FEDER[place] / Product Type: PPO /  [facility] at [address] [place] [address], [address] [place] [address]  [phone]   [phone]          OUTPATIENT PHYSICAL THERAPY:Daily Note 2018    ICD-10: Treatment Diagnosis: Chronic pelvic pain (R10.2); spasm of muscle (M62.838); Pelvic floor dysfunction (M62.89)  Precautions/Allergies:   Codeine and Dermagraft [cult skin subst, human-bovine]   Fall Risk Score: 0 (? 5 = High Risk)  MD Orders: Evaluate and treat MEDICAL/REFERRING DIAGNOSIS:  Pelvic floor dysfunction [M62.89]   DATE OF ONSET: Chronic endometriosis since menstruation  REFERRING PHYSICIAN: Sherice Husain, *  RETURN PHYSICIAN APPOINTMENT: TBD     INITIAL ASSESSMENT:  Ms. Otilia Treviño presents with significant pelvic floor muscle (PFM) over activity and lack of coordination, as well as connective tissue restrictions and trigger points in her adductors, abdominal wall and posterior chain; this results in chronic pelvic pain. She also demonstrates weakness throughout her core musculature and pelvic girdle. This is contributing to her difficulty with all ADL's and physical activity. I believe she will benefit from skilled PT, with an emphasis on manual therapy and muscle retraining, strength, and coordination to improve her ability to perform housework, , work responsibilities and physical activity without pain or difficulty. She is pleasant, motivated and eager to return to her prior level of function. Progress may be imited, however, due to the chronic and severe nature of her pain. RE-CERTIFICATION, PROGRESS NOTE 2018: Delio Jennings has been seen in PT for 9 sessions since 2017. Treatment has emphasized manual therapy throughout the pelvic girdle, both internally and externally, as well as PFM down training and sexual and bowel/bladder health education.  She is able to tolerate more internal PFM physical therapy, but continues to report significant pain at levator ani and obturator internus B. All PFM on L reproduce/radiate into her L lower quadrant. She receives temporary relief in her pelvic pain following PT. However, her surgeons believe that she is having a re-occurrence of endometriosis and she is schedule to undergo surgery on April 4, 2018 for excision of endometriosis and removal of her L ovary. Because of this, I believe she will continue to benefit from skilled PT to manage her pain and prepare her body and musculature for surgery. She is pleasant and motivated and using dilators at home. PROBLEM LIST (Impacting functional limitations):  1. Decreased Strength  2. Decreased ADL/Functional Activities  3. Increased Pain  4. Decreased Activity Tolerance INTERVENTIONS PLANNED:  1. Electrical Stimulation  2. Home Exercise Program (HEP)  3. Therapeutic Activites  4. Therapeutic Exercise/Strengthening   TREATMENT PLAN:  Effective Dates: 2/8/2018 TO 5/3/2018 . Frequency/Duration: Once every two weeks for 12 weeks. GOALS: (Goals have been discussed and agreed upon with patient.)    Short-Term Functional Goals: Time Frame: 6 weeks  1. Pt will demonstrate N voluntary relaxation of PFM to improve her ability to urinate without hesitancy or incomplete emptying. (ONGOING)  2. Pt will demonstrate I with basic PFM HEP to improve resting tone and decrease pain with prolonged sitting. (MET 1/4/2018)    Discharge Goals: Time Frame: 12 weeks  1. Pt will reports < 2/10 pain with 1 finger palpation of PFM and obturator internus (OI); to initiate pain free intercourse with spouse. (ONGOING)  2. Pt will demonstrate appropriate co contraction of the Transversus Abdominus and Pelvic Floor muscle group (without excessive compensation), and maintain x 60 seconds to improve core stability and return to dynamic physical activity such as running and jumping. (ONGOING)  3.  Pt will tolerate dilator training with < 3/10 pain to initiate at home and improve tenderness in PFM to return to pain free sexual activity with spouse. (ONGOING)    Rehabilitation Potential For Stated Goals: Good                The information in this section was collected on 11/7/2017 (except where otherwise noted). HISTORY:   History of Present Injury/Illness (Reason for Referral):  Pt with a history of chronic endometriosis. This has resulted in > 10 surgeries to remove, including hysterectomy (has ovaries) and excision in November 2016. Pain comes and goes, but is always present. Recent worsening of pain in beginning of November. Urinary: Complains of hesitancy and incomplete emptying. Takes ~ 15-30 seconds to start stream. Denies UI or dysuria at this time,  Bowel: History of constipation managed with OTC medications. However, recently has been going daily with push/strain. Sexual: Sex has always been painful. Currently, pain during is 6/10 (diffuse in the vagina) and 8/10 after (throughout the pelvic girdle). 10/10 if he \"hits my cuff\". Pelvic Organ Prolapse/Pelvic Pain: Daily pelvic pain reported in L LQ. Sharp, shooting down anterior thigh on L. Can radiate up to ribs. Best pain is 6/10 while on pain medication, worst 10/10. Nothing seems to make pain better other than meds, prolonged sitting or standing, as well as ADL's increase pain. RE-CERTIFICATION/PROGRESS NOTE 2/8/2018: Urinary hesitancy has persisted since starting PT. Bowel function continues to be limited, and requires OTC medication and push/strain to empty. [place] has improved slightly since returning to PT. Pain is present constantly, but severity has decreased to a 5/10 and the length of pain following PT has decreased to < 1 day. Daily pelvic pain is present. Relief in severity to < 5/10 following PT (for a few days), then returns to 7/10 (consistently) in the L lower quadrant. Patient states pain feels like \"ovary pain\".      Past Medical History/Comorbidities:   Ms. Flower Bradshaw  has a past medical history of Anxiety (9/6/2013); Endometriosis; Gluten intolerance; Migraine; Nausea & vomiting; Ovarian cyst; PUD (peptic ulcer disease) (12/2013 ); Unspecified adverse effect of anesthesia; UTI (urinary tract infection); and Vaginal cuff dehiscence. Ms. Flower Bradshaw  has a past surgical history that includes hx hysteroscopy; pr endometrial cryoablation; hx other surgical; hx bilateral salpingectomy; hx other surgical (11/2016); hx total laparoscopic hysterectomy; hx breast augmentation (2003); hx gyn (2000, 2004); hx dilation and curettage; and hx pelvic laparoscopy (11/03/2016). Social History/Living Environment:     Pt lives in 2 story home with spouse and 3 children    Prior Level of Function/Work/Activity:  Works part time at [employer]    Current Medications:       Current Outpatient Prescriptions:     ALPRAZolam (XANAX) 0.25 mg tablet, Take 0.25 mg by mouth now., Disp: , Rfl:     UBIDECARENONE/VITAMIN E MIXED (COQ10  PO), Take  by mouth nightly., Disp: , Rfl:     VIT B2/NIACIN/B6/B12/DEXPANTH (B COMPLEX SL), by SubLINGual route daily. , Disp: , Rfl:     carisoprodol (SOMA) 250 mg tablet, Take 250 mg by mouth four (4) times daily. Pt takes only 1 x/d at most at [facility], Disp: , Rfl:     minocycline (MINOCIN, DYNACIN) 100 mg capsule, Take  by mouth every Monday, Wednesday, Friday. Pt takes twice a day on these days For antiinflammatory in connection with endometriosis, Disp: , Rfl:     traMADol (ULTRAM) 50 mg tablet, Take 1-2 Tabs by mouth every six (6) hours as needed for Pain. Max Daily Amount: 400 mg., Disp: 60 Tab, Rfl: 0    clonazePAM (KLONOPIN) 0.5 mg tablet, Take  by mouth nightly as needed. , Disp: , Rfl:     amitriptyline (ELAVIL) 25 mg tablet, Take 1 Tab by mouth nightly.  Indications: NEUROPATHIC PAIN, Disp: 30 Tab, Rfl: 11    gabapentin (NEURONTIN) 100 mg capsule, TAKE THREE CAPSULES BY MOUTH EVERY DAY (Patient taking differently: TAKE THREE CAPSULES BY MOUTH EVERY DAY at HS), Disp: 90 Cap, Rfl: 6    citalopram (CELEXA) 10 mg tablet, Take 20 mg by mouth every evening., Disp: , Rfl:     promethazine (PHENERGAN) 25 mg tablet, Take 25 mg by mouth every six (6) hours as needed for Nausea., Disp: , Rfl:     ibuprofen (MOTRIN) 800 mg tablet, Take 1 Tab by mouth every six (6) hours as needed. (Patient taking differently: Take 800 mg by mouth every six (6) hours as needed. Last dose 12/17/17), Disp: 30 Tab, Rfl: 0    magnesium oxide (MAG-OX) 400 mg tablet, Take 400 mg by mouth daily. , Disp: , Rfl:     melatonin 3 mg tablet, Take 5 mg by mouth nightly., Disp: , Rfl:     loratadine (CLARITIN) 10 mg tablet, Take 10 mg by mouth every evening., Disp: , Rfl:     LACTOBACILLUS ACIDOPHILUS (PROBIOTIC PO), Take  by mouth., Disp: , Rfl:     multivitamin (ONE A DAY) tablet, Take 1 Tab by mouth daily. , Disp: , Rfl:    Date Last Reviewed:  2/22/2018     Number of Personal Factors/Comorbidities that affect the Plan of Care: 1-2: MODERATE COMPLEXITY   EXAMINATION:   UPDATED 2/8/2018:  Palpation:          Adductors: MILD connective tissue (CT) restrictions ON R, MODERATE ON L with pain and wincing on L. Pudendal nerve glides significantly tender to palpation (TTP) on L, moderately painful on         R. Thoracic Spine: T10-12 refers pain towards vaginal cuff, however mobility is WNL. PFM via vaginal canal: L superficial PFM non tender locally, but with sustained pressure refer towards L lower quadrant. All levator ani and OI TTP B, with severe pain reported diffusely throughout       vagina, into LB and L LQ.  ROM:          Pelvic mobility: WNL and painfree. Lumbar Mobility: WNL and painfree. Strength: Of PFM via SEMG: Resting tone generally 10 mV, but with MT and drops patient is able to achieve < 1.2 mV. Superficial PFM via vaginal canal: Power at least 3/5 with moderate delay in relaxation.  Transverse Abdominus (TA): Good with strong PFM co contraction       Body Structures Involved:  1. Muscles Body Functions Affected:  1. Reproductive  2. Neuromusculoskeletal  3. Movement Related Activities and Participation Affected:  1. General Tasks and Demands  2. Mobility  3. Self Care  4. Interpersonal Interactions and Relationships   Number of elements (examined above) that affect the Plan of Care: 3: MODERATE COMPLEXITY   CLINICAL PRESENTATION:   Presentation: Evolving clinical presentation with changing clinical characteristics: MODERATE COMPLEXITY   CLINICAL DECISION MAKING:   Outcome Measure: Tool Used: Pain Disability Index  Score:  Initial: 11/7/2017; 42 Most Recent: TBD   Interpretation of Score: The rating scale measures the degree to which aspects of your life are disrupted by chronic pain. For each of the 7 categories, patient circles the level of disability they experience 0 to 10. 0 signifies no disability at all, 10 signifies that these activities are totally disrupted by pain. Medical Necessity:   · Patient is expected to demonstrate progress in range of motion, coordination and functional technique to return to painfree ADL's, work activities and recreation. Reason for Services/Other Comments:  · Patient continues to require skilled intervention due to above mentioned deficits. Use of outcome tool(s) and clinical judgement create a POC that gives a: Questionable prediction of patient's progress: MODERATE COMPLEXITY            TREATMENT:   (In addition to Assessment/Re-Assessment sessions the following treatments were rendered)    Pre-treatment Symptoms/Complaints: Very stressful two weeks. Children are ill and  is still out of town. Did have intercourse and reported significant pain the day after. Pain: Initial: Pain Intensity 1: 8/10 Post Session:  6/10     THERAPEUTIC EXERCISE: (0 minutes):  Exercises per grid below to improve mobility and coordination.   Required moderate verbal and tactile cues to promote proper performance of exercise. Progressed repetitions and complexity of movement as indicated. Date:  2/22/2018     Activity/Exercise Parameters   Pelvic Floor Drops    Diaphragmatic Breathing    Prone Press Ups    Cat/Camel    Adductor stretch    Home Exercise Program Drops, DKTC/Adductor Stretch, Pelvic Mobility, Dilators     MANUAL THERAPY: (55 minutes): Joint mobilization and Soft tissue mobilization was utilized and necessary because of the patient's painful spasm. (Used abbreviations: MET - muscle energy technique; SCS- Strain counter strain; CTM- Connective tissue mobilizations; CR- Contract/relax; SP- Sustained pressure). - CTM and IASTM to B adductors and ischiorectal fossa  - Pudendal nerve glides B  - Gentle iliacus release B   - CTM to suprapubic region  - Trigger point dry needling performed to B adductor sami/longus, distal medial hamstring, and rectus abdominus      Treatment/Session Assessment:    · Response to Treatment:  Due to increased pain, emphasis on MT to decrease pain and improve mobility. Improvements noted in tolerance for internal interventions, but significant pain still reported. · Compliance with Program/Exercises: Compliant with downtraining and flexibility at this time. · Recommendations/Intent for next treatment session: \"Next visit will focus on internal,thoracic spine mobility\".  COMPLETE [place]    Total Treatment Duration: 55 minutes, 5 minutes regla/doff clothing  PT Patient Time In/Time Out  Time In: 1230  Time Out: [address] [facility], PT

## 2018-03-02 ENCOUNTER — HOSPITAL ENCOUNTER (OUTPATIENT)
Dept: PHYSICAL THERAPY | Age: 33
Discharge: HOME | End: 2018-03-02
Payer: COMMERCIAL

## 2018-03-02 PROCEDURE — 97110 THERAPEUTIC EXERCISES: CPT

## 2018-03-02 PROCEDURE — 97140 MANUAL THERAPY 1/> REGIONS: CPT

## 2018-03-02 NOTE — PROGRESS NOTES
Ten Rojas  : 1985  Payor: Angelique Najera / Plan: [place] / Product Type: PPO /  [facility]rapy Center at [address] [place] [address]  [phone]   [phone]          OUTPATIENT PHYSICAL THERAPY:Daily Note 3/2/2018    ICD-10: Treatment Diagnosis: Chronic pelvic pain (R10.2); spasm of muscle (M62.838); Pelvic floor dysfunction (M62.89)  Precautions/Allergies:   Codeine and Dermagraft [cult skin subst, human-bovine]   Fall Risk Score: 0 (? 5 = High Risk)  MD Orders: Evaluate and treat MEDICAL/REFERRING DIAGNOSIS:  Pelvic floor dysfunction [M62.89]   DATE OF ONSET: Chronic endometriosis since menstruation  REFERRING PHYSICIAN: Malcom France, *  RETURN PHYSICIAN APPOINTMENT: TBD     INITIAL ASSESSMENT:  Ms. Rodney Pete presents with significant pelvic floor muscle (PFM) over activity and lack of coordination, as well as connective tissue restrictions and trigger points in her adductors, abdominal wall and posterior chain; this results in chronic pelvic pain. She also demonstrates weakness throughout her core musculature and pelvic girdle. This is contributing to her difficulty with all ADL's and physical activity. I believe she will benefit from skilled PT, with an emphasis on manual therapy and muscle retraining, strength, and coordination to improve her ability to perform housework, , work responsibilities and physical activity without pain or difficulty. She is pleasant, motivated and eager to return to her prior level of function. Progress may be imited, however, due to the chronic and severe nature of her pain. RE-CERTIFICATION, PROGRESS NOTE 2018: Javi Fernandez has been seen in PT for 9 sessions since 2017. Treatment has emphasized manual therapy throughout the pelvic girdle, both internally and externally, as well as PFM down training and sexual and bowel/bladder health education.  She is able to tolerate more internal PFM physical therapy, but continues to report significant pain at levator ani and obturator internus B. All PFM on L reproduce/radiate into her L lower quadrant. She receives temporary relief in her pelvic pain following PT. However, her surgeons believe that she is having a re-occurrence of endometriosis and she is schedule to undergo surgery on April 4, 2018 for excision of endometriosis and removal of her L ovary. Because of this, I believe she will continue to benefit from skilled PT to manage her pain and prepare her body and musculature for surgery. She is pleasant and motivated and using dilators at home. PROBLEM LIST (Impacting functional limitations):  1. Decreased Strength  2. Decreased ADL/Functional Activities  3. Increased Pain  4. Decreased Activity Tolerance INTERVENTIONS PLANNED:  1. Electrical Stimulation  2. Home Exercise Program (HEP)  3. Therapeutic Activites  4. Therapeutic Exercise/Strengthening   TREATMENT PLAN:  Effective Dates: 2/8/2018 TO 5/3/2018 . Frequency/Duration: Once every two weeks for 12 weeks. GOALS: (Goals have been discussed and agreed upon with patient.)    Short-Term Functional Goals: Time Frame: 6 weeks  1. Pt will demonstrate N voluntary relaxation of PFM to improve her ability to urinate without hesitancy or incomplete emptying. (ONGOING)  2. Pt will demonstrate I with basic PFM HEP to improve resting tone and decrease pain with prolonged sitting. (MET 1/4/2018)    Discharge Goals: Time Frame: 12 weeks  1. Pt will reports < 2/10 pain with 1 finger palpation of PFM and obturator internus (OI); to initiate pain free intercourse with spouse. (ONGOING)  2. Pt will demonstrate appropriate co contraction of the Transversus Abdominus and Pelvic Floor muscle group (without excessive compensation), and maintain x 60 seconds to improve core stability and return to dynamic physical activity such as running and jumping. (ONGOING)  3.  Pt will tolerate dilator training with < 3/10 pain to initiate at home and improve tenderness in PFM to return to pain free sexual activity with spouse. (ONGOING)    Rehabilitation Potential For Stated Goals: Good                The information in this section was collected on 11/7/2017 (except where otherwise noted). HISTORY:   History of Present Injury/Illness (Reason for Referral):  Pt with a history of chronic endometriosis. This has resulted in > 10 surgeries to remove, including hysterectomy (has ovaries) and excision in November 2016. Pain comes and goes, but is always present. Recent worsening of pain in beginning of November. Urinary: Complains of hesitancy and incomplete emptying. Takes ~ 15-30 seconds to start stream. Denies UI or dysuria at this time,  Bowel: History of constipation managed with OTC medications. However, recently has been going daily with push/strain. Sexual: Sex has always been painful. Currently, pain during is 6/10 (diffuse in the vagina) and 8/10 after (throughout the pelvic girdle). 10/10 if he \"hits my cuff\". Pelvic Organ Prolapse/Pelvic Pain: Daily pelvic pain reported in L LQ. Sharp, shooting down anterior thigh on L. Can radiate up to ribs. Best pain is 6/10 while on pain medication, worst 10/10. Nothing seems to make pain better other than meds, prolonged sitting or standing, as well as ADL's increase pain. RE-CERTIFICATION/PROGRESS NOTE 2/8/2018: Urinary hesitancy has persisted since starting PT. Bowel function continues to be limited, and requires OTC medication and push/strain to empty. [place] has improved slightly since returning to PT. Pain is present constantly, but severity has decreased to a 5/10 and the length of pain following PT has decreased to < 1 day. Daily pelvic pain is present. Relief in severity to < 5/10 following PT (for a few days), then returns to 7/10 (consistently) in the L lower quadrant. Patient states pain feels like \"ovary pain\".      Past Medical History/Comorbidities:   Ms. Pérez Baird  has a past medical history of Anxiety (9/6/2013); Endometriosis; Gluten intolerance; Migraine; Nausea & vomiting; Ovarian cyst; PUD (peptic ulcer disease) (12/2013 ); Unspecified adverse effect of anesthesia; UTI (urinary tract infection); and Vaginal cuff dehiscence. Ms. Pérez Baird  has a past surgical history that includes hx hysteroscopy; pr endometrial cryoablation; hx other surgical; hx bilateral salpingectomy; hx other surgical (11/2016); hx total laparoscopic hysterectomy; hx breast augmentation (2003); hx gyn (2000, 2004); hx dilation and curettage; and hx pelvic laparoscopy (11/03/2016). Social History/Living Environment:     Pt lives in 2 story home with spouse and 3 children    Prior Level of Function/Work/Activity:  Works part time at [employer]    Current Medications:       Current Outpatient Prescriptions:     ALPRAZolam (XANAX) 0.25 mg tablet, Take 0.25 mg by mouth now., Disp: , Rfl:     UBIDECARENONE/VITAMIN E MIXED (COQ10  PO), Take  by mouth nightly., Disp: , Rfl:     VIT B2/NIACIN/B6/B12/DEXPANTH (B COMPLEX SL), by SubLINGual route daily. , Disp: , Rfl:     carisoprodol (SOMA) 250 mg tablet, Take 250 mg by mouth four (4) times daily. Pt takes only 1 x/d at [facility] at [facility], Disp: , Rfl:     minocycline (MINOCIN, DYNACIN) 100 mg capsule, Take  by mouth every Monday, Wednesday, Friday. Pt takes twice a day on these days For antiinflammatory in connection with endometriosis, Disp: , Rfl:     traMADol (ULTRAM) 50 mg tablet, Take 1-2 Tabs by mouth every six (6) hours as needed for Pain. Max Daily Amount: 400 mg., Disp: 60 Tab, Rfl: 0    clonazePAM (KLONOPIN) 0.5 mg tablet, Take  by mouth nightly as needed. , Disp: , Rfl:     amitriptyline (ELAVIL) 25 mg tablet, Take 1 Tab by mouth nightly.  Indications: NEUROPATHIC PAIN, Disp: 30 Tab, Rfl: 11    gabapentin (NEURONTIN) 100 mg capsule, TAKE THREE CAPSULES BY MOUTH EVERY DAY (Patient taking differently: TAKE THREE CAPSULES BY MOUTH EVERY DAY at HS), Disp: 90 Cap, Rfl: 6    citalopram (CELEXA) 10 mg tablet, Take 20 mg by mouth every evening., Disp: , Rfl:     promethazine (PHENERGAN) 25 mg tablet, Take 25 mg by mouth every six (6) hours as needed for Nausea., Disp: , Rfl:     ibuprofen (MOTRIN) 800 mg tablet, Take 1 Tab by mouth every six (6) hours as needed. (Patient taking differently: Take 800 mg by mouth every six (6) hours as needed. Last dose 12/17/17), Disp: 30 Tab, Rfl: 0    magnesium oxide (MAG-OX) 400 mg tablet, Take 400 mg by mouth daily. , Disp: , Rfl:     melatonin 3 mg tablet, Take 5 mg by mouth nightly., Disp: , Rfl:     loratadine (CLARITIN) 10 mg tablet, Take 10 mg by mouth every evening., Disp: , Rfl:     LACTOBACILLUS ACIDOPHILUS (PROBIOTIC PO), Take  by mouth., Disp: , Rfl:     multivitamin (ONE A DAY) tablet, Take 1 Tab by mouth daily. , Disp: , Rfl:    Date Last Reviewed:  3/2/2018     Number of Personal Factors/Comorbidities that affect the Plan of Care: 1-2: MODERATE COMPLEXITY   EXAMINATION:   UPDATED 2/8/2018:  Palpation:          Adductors: MILD connective tissue (CT) restrictions ON R, MODERATE ON L with pain and wincing on L. Pudendal nerve glides significantly tender to palpation (TTP) on L, moderately painful on         R. Thoracic Spine: T10-12 refers pain towards vaginal cuff, however mobility is WNL. PFM via vaginal canal: L superficial PFM non tender locally, but with sustained pressure refer towards L lower quadrant. All levator ani and OI TTP B, with severe pain reported diffusely throughout       vagina, into LB and L LQ.  ROM:          Pelvic mobility: WNL and painfree. Lumbar Mobility: WNL and painfree. Strength: Of PFM via SEMG: Resting tone generally 10 mV, but with MT and drops patient is able to achieve < 1.2 mV. Superficial PFM via vaginal canal: Power at least 3/5 with moderate delay in relaxation.  Transverse Abdominus (TA): Good with strong PFM co contraction       Body Structures Involved:  1. Muscles Body Functions Affected:  1. Reproductive  2. Neuromusculoskeletal  3. Movement Related Activities and Participation Affected:  1. General Tasks and Demands  2. Mobility  3. Self Care  4. Interpersonal Interactions and Relationships   Number of elements (examined above) that affect the Plan of Care: 3: MODERATE COMPLEXITY   CLINICAL PRESENTATION:   Presentation: Evolving clinical presentation with changing clinical characteristics: MODERATE COMPLEXITY   CLINICAL DECISION MAKING:   Outcome Measure: Tool Used: Pain Disability Index  Score:  Initial: 11/7/2017; 42 Most Recent: TBD   Interpretation of Score: The rating scale measures the degree to which aspects of your life are disrupted by chronic pain. For each of the 7 categories, patient circles the level of disability they experience 0 to 10. 0 signifies no disability at all, 10 signifies that these activities are totally disrupted by pain. Medical Necessity:   · Patient is expected to demonstrate progress in range of motion, coordination and functional technique to return to painfree ADL's, work activities and recreation. Reason for Services/Other Comments:  · Patient continues to require skilled intervention due to above mentioned deficits. Use of outcome tool(s) and clinical judgement create a POC that gives a: Questionable prediction of patient's progress: MODERATE COMPLEXITY            TREATMENT:   (In addition to Assessment/Re-Assessment sessions the following treatments were rendered)    Pre-treatment Symptoms/Complaints: The weather really seems to have worsened pelvic pain symptoms. Significant L sided lower quadrant pain (like a stabbing, searing pain) present almost constantly.  Stretching in the am and pm does seem to help some, but this past week has been busy with kids and life    Pain: Initial: Pain Intensity 1: 7/10 Post Session:  6/10     THERAPEUTIC EXERCISE: (8 minutes):  Exercises per grid below to improve mobility and coordination. Required moderate verbal and tactile cues to promote proper performance of exercise. Progressed repetitions and complexity of movement as indicated. Date:  3/2/2018     Activity/Exercise Parameters   Pelvic Floor Drops 2 minutes   Diaphragmatic Breathing 2 minutes   Prone Press Ups    Cat/Camel    Adductor stretch 2 minutes   Child's Pose W/ overpressure; 2 minutes   Home Exercise Program Drops, DKTC/Adductor Stretch, Pelvic Mobility, Dilators     MANUAL THERAPY: (50 minutes): Joint mobilization and Soft tissue mobilization was utilized and necessary because of the patient's painful spasm. (Used abbreviations: MET - muscle energy technique; SCS- Strain counter strain; CTM- Connective tissue mobilizations; CR- Contract/relax; SP- Sustained pressure). - CTM and IASTM to B adductors and ischiorectal fossa  - Pudendal nerve glides B  - Gentle iliacus release B   - CTM to suprapubic region  - Trigger point dry needling performed to B Upper lumbar multifidus and gluteus jennifer    MODALITIES: (10 minutes): *  Hot Pack Therapy in order to provide analgesia and relieve muscle spasm. (Applied suprapubically during internal MT. DOES NOT CONTRIBUTE TO TOTAL TIME)    Treatment/Session Assessment:    · Response to Treatment:  Slight increase in pain with palpation of L levator ani and OI, with no change with S/CS. Emphasized TDN to posterior chain to improve gluteal mobility. Slight improvements in symptoms following PT    · Compliance with Program/Exercises: Compliant with downtraining and flexibility at this time. · Recommendations/Intent for next treatment session: \"Next visit will focus on internal,thoracic spine mobility\".  COMPLETE Reed[place]ire    Total Treatment Duration: 58 minutes  PT Patient Time In/Time Out  Time In: 0964  Time Out: 30 [address], PT

## 2018-03-13 ENCOUNTER — HOSPITAL ENCOUNTER (OUTPATIENT)
Dept: PHYSICAL THERAPY | Age: 33
Discharge: HOME | End: 2018-03-13
Payer: COMMERCIAL

## 2018-03-13 PROCEDURE — 97110 THERAPEUTIC EXERCISES: CPT

## 2018-03-13 PROCEDURE — 97140 MANUAL THERAPY 1/> REGIONS: CPT

## 2018-03-13 NOTE — THERAPY RECERTIFICATION
José Rojas  : 1985  Payor: Yulia Phelan / Plan: [facility] FEDERAL / Product Type: PPO /  [address]  at [address]  [phone]   [phone]          OUTPATIENT PHYSICAL THERAPY:Daily Note and Recertification     ICD-10: Treatment Diagnosis: Chronic pelvic pain (R10.2); spasm of muscle (M62.838); Pelvic floor dysfunction (M62.89)  Precautions/Allergies:   Codeine and Dermagraft [cult skin subst, human-bovine]   Fall Risk Score: 0 (? 5 = High Risk)  MD Orders: Evaluate and treat MEDICAL/REFERRING DIAGNOSIS:  Pelvic floor dysfunction [M62.89]   DATE OF ONSET: Chronic endometriosis since menstruation  REFERRING PHYSICIAN: Handy Vaca, *  RETURN PHYSICIAN APPOINTMENT: TBD     INITIAL ASSESSMENT:  Ms. Anderson Leon presents with significant pelvic floor muscle (PFM) over activity and lack of coordination, as well as connective tissue restrictions and trigger points in her adductors, abdominal wall and posterior chain; this results in chronic pelvic pain. She also demonstrates weakness throughout her core musculature and pelvic girdle. This is contributing to her difficulty with all ADL's and physical activity. I believe she will benefit from skilled PT, with an emphasis on manual therapy and muscle retraining, strength, and coordination to improve her ability to perform housework, , work responsibilities and physical activity without pain or difficulty. She is pleasant, motivated and eager to return to her prior level of function. Progress may be imited, however, due to the chronic and severe nature of her pain. RE-CERTIFICATION, PROGRESS NOTE 2018: Georgia Bernardo has been seen in PT for 9 sessions since 2017. Treatment has emphasized manual therapy throughout the pelvic girdle, both internally and externally, as well as PFM down training and sexual and bowel/bladder health education.  She is able to tolerate more internal PFM physical therapy, but continues to report significant pain at levator ani and obturator internus B. All PFM on L reproduce/radiate into her L lower quadrant. She receives temporary relief in her pelvic pain following PT. However, her surgeons believe that she is having a re-occurrence of endometriosis and she is schedule to undergo surgery on April 4, 2018 for excision of endometriosis and removal of her L ovary. Because of this, I believe she will continue to benefit from skilled PT to manage her pain and prepare her body and musculature for surgery. She is pleasant and motivated and using dilators at home. PROBLEM LIST (Impacting functional limitations):  1. Decreased Strength  2. Decreased ADL/Functional Activities  3. Increased Pain  4. Decreased Activity Tolerance INTERVENTIONS PLANNED:  1. Electrical Stimulation  2. Home Exercise Program (HEP)  3. Therapeutic Activites  4. Therapeutic Exercise/Strengthening   TREATMENT PLAN:  Effective Dates: 3/13/2018 TO 4/3/2018 . Frequency/Duration: 1-2 x weekly for 3 weeks    GOALS: (Goals have been discussed and agreed upon with patient.)    Short-Term Functional Goals: Time Frame: 6 weeks  1. Pt will demonstrate N voluntary relaxation of PFM to improve her ability to urinate without hesitancy or incomplete emptying. (ONGOING)  2. Pt will demonstrate I with basic PFM HEP to improve resting tone and decrease pain with prolonged sitting. (MET 1/4/2018)    Discharge Goals: Time Frame: 12 weeks  1. Pt will reports < 2/10 pain with 1 finger palpation of PFM and obturator internus (OI); to initiate pain free intercourse with spouse. (ONGOING)  2. Pt will demonstrate appropriate co contraction of the Transversus Abdominus and Pelvic Floor muscle group (without excessive compensation), and maintain x 60 seconds to improve core stability and return to dynamic physical activity such as running and jumping. (ONGOING)  3.  Pt will tolerate dilator training with < 3/10 pain to initiate at home and improve tenderness in PFM to return to pain free sexual activity with spouse. (ONGOING)    Rehabilitation Potential For Stated Goals: Good    Regarding [place] therapy, I certify that the treatment plan above will be carried out by a therapist or under their direction. Thank you for this referral,  Karol Partida, PT       Referring Physician Signature: Gabbi Robert MD              Date                         The information in this section was collected on 11/7/2017 (except where otherwise noted). HISTORY:   History of Present Injury/Illness (Reason for Referral):  Pt with a history of chronic endometriosis. This has resulted in > 10 surgeries to remove, including hysterectomy (has ovaries) and excision in November 2016. Pain comes and goes, but is always present. Recent worsening of pain in beginning of November. Urinary: Complains of hesitancy and incomplete emptying. Takes ~ 15-30 seconds to start stream. Denies UI or dysuria at this time,  Bowel: History of constipation managed with OTC medications. However, recently has been going daily with push/strain. Sexual: Sex has always been painful. Currently, pain during is 6/10 (diffuse in the vagina) and 8/10 after (throughout the pelvic girdle). 10/10 if he \"hits my cuff\". Pelvic Organ Prolapse/Pelvic Pain: Daily pelvic pain reported in L LQ. Sharp, shooting down anterior thigh on L. Can radiate up to ribs. Best pain is 6/10 while on pain medication, worst 10/10. Nothing seems to make pain better other than meds, prolonged sitting or standing, as well as ADL's increase pain. RE-CERTIFICATION/PROGRESS NOTE 2/8/2018: Urinary hesitancy has persisted since starting PT. Bowel function continues to be limited, and requires OTC medication and push/strain to empty. [place] has improved slightly since returning to PT.  Pain is present constantly, but severity has decreased to a 5/10 and the length of pain following PT has decreased to < 1 day. Daily pelvic pain is present. Relief in severity to < 5/10 following PT (for a few days), then returns to 7/10 (consistently) in the L lower quadrant. Patient states pain feels like \"ovary pain\". Past Medical History/Comorbidities:   Ms. Connor Bravo  has a past medical history of Anxiety (9/6/2013); Endometriosis; Gluten intolerance; Migraine; Nausea & vomiting; Ovarian cyst; PUD (peptic ulcer disease) (12/2013 ); Unspecified adverse effect of anesthesia; UTI (urinary tract infection); and Vaginal cuff dehiscence. Ms. Connor Bravo  has a past surgical history that includes hx hysteroscopy; pr endometrial cryoablation; hx other surgical; hx bilateral salpingectomy; hx other surgical (11/2016); hx total laparoscopic hysterectomy; hx breast augmentation (2003); hx gyn (2000, 2004); hx dilation and curettage; and hx pelvic laparoscopy (11/03/2016). Social History/Living Environment:     Pt lives in [address] [place] home with spouse and 3 children    Prior Level of Function/Work/Activity:  Works part time at [employer]    Current Medications:       Current Outpatient Prescriptions:     ALPRAZolam (XANAX) 0.25 mg tablet, Take 0.25 mg by mouth now., Disp: , Rfl:     UBIDECARENONE/VITAMIN E MIXED (COQ10  PO), Take  by mouth nightly., Disp: , Rfl:     VIT B2/NIACIN/B6/B12/DEXPANTH (B COMPLEX SL), by SubLINGual route daily. , Disp: , Rfl:     carisoprodol (SOMA) 250 mg tablet, Take 250 mg by mouth four (4) times daily. Pt takes only 1 x/d at most at [facility], Disp: , Rfl:     minocycline (MINOCIN, DYNACIN) 100 mg capsule, Take  by mouth every Monday, Wednesday, Friday. Pt takes twice a day on these days For antiinflammatory in connection with endometriosis, Disp: , Rfl:     traMADol (ULTRAM) 50 mg tablet, Take 1-2 Tabs by mouth every six (6) hours as needed for Pain.  Max Daily Amount: 400 mg., Disp: 60 Tab, Rfl: 0    clonazePAM (KLONOPIN) 0.5 mg tablet, Take  by mouth nightly as needed. , Disp: , Rfl:     amitriptyline (ELAVIL) 25 mg tablet, Take 1 Tab by mouth nightly. Indications: NEUROPATHIC PAIN, Disp: 30 Tab, Rfl: 11    gabapentin (NEURONTIN) 100 mg capsule, TAKE THREE CAPSULES BY MOUTH EVERY DAY (Patient taking differently: TAKE THREE CAPSULES BY MOUTH EVERY DAY at HS), Disp: 90 Cap, Rfl: 6    citalopram (CELEXA) 10 mg tablet, Take 20 mg by mouth every evening., Disp: , Rfl:     promethazine (PHENERGAN) 25 mg tablet, Take 25 mg by mouth every six (6) hours as needed for Nausea., Disp: , Rfl:     ibuprofen (MOTRIN) 800 mg tablet, Take 1 Tab by mouth every six (6) hours as needed. (Patient taking differently: Take 800 mg by mouth every six (6) hours as needed. Last dose 12/17/17), Disp: 30 Tab, Rfl: 0    magnesium oxide (MAG-OX) 400 mg tablet, Take 400 mg by mouth daily. , Disp: , Rfl:     melatonin 3 mg tablet, Take 5 mg by mouth nightly., Disp: , Rfl:     loratadine (CLARITIN) 10 mg tablet, Take 10 mg by mouth every evening., Disp: , Rfl:     LACTOBACILLUS ACIDOPHILUS (PROBIOTIC PO), Take  by mouth., Disp: , Rfl:     multivitamin (ONE A DAY) tablet, Take 1 Tab by mouth daily. , Disp: , Rfl:    Date Last Reviewed:  3/13/2018     Number of Personal Factors/Comorbidities that affect the Plan of Care: 1-2: MODERATE COMPLEXITY   EXAMINATION:   UPDATED 2/8/2018:  Palpation:          Adductors: MILD connective tissue (CT) restrictions ON R, MODERATE ON L with pain and wincing on L. Pudendal nerve glides significantly tender to palpation (TTP) on L, moderately painful on         R. Thoracic Spine: T10-12 refers pain towards vaginal cuff, however mobility is WNL. PFM via vaginal canal: L superficial PFM non tender locally, but with sustained pressure refer towards L lower quadrant. All levator ani and OI TTP B, with severe pain reported diffusely throughout       vagina, into LB and L LQ.  ROM:          Pelvic mobility: WNL and painfree.         Lumbar Mobility: WNL and painfree. Strength: Of PFM via SEMG: Resting tone generally 10 mV, but with MT and drops patient is able to achieve < 1.2 mV. Superficial PFM via vaginal canal: Power at least 3/5 with moderate delay in relaxation. Transverse Abdominus (TA): Good with strong PFM co contraction    Objective Findings from 3/13: In standing, pt demonstrates R femoral IR, R genu recurvatum, elevation of R iliac crest, L innominate ER and anterior rotation. Minimal flex/ext of L innominate in standing swing test. Palpation: Unable to palpate L ovary, significant restrictions of bladder mobility in R sidelying (towards 11:00), moderate restrictions of descending colon over iliacus. Visible restrictions of L hysterectomy scar, as well as at umbilicus. Difficulty  L rectus abdominus (at umbilicus) form external obliques. Atrophy of R gluteus medius noted as well. Body Structures Involved:  1. Muscles Body Functions Affected:  1. Reproductive  2. Neuromusculoskeletal  3. Movement Related Activities and Participation Affected:  1. General Tasks and Demands  2. Mobility  3. Self Care  4. Interpersonal Interactions and Relationships   Number of elements (examined above) that affect the Plan of Care: 3: MODERATE COMPLEXITY   CLINICAL PRESENTATION:   Presentation: Evolving clinical presentation with changing clinical characteristics: MODERATE COMPLEXITY   CLINICAL DECISION MAKING:   Outcome Measure: Tool Used: Pain Disability Index  Score:  Initial: 11/7/2017; 42 Most Recent: TBD   Interpretation of Score: The rating scale measures the degree to which aspects of your life are disrupted by chronic pain. For each of the 7 categories, patient circles the level of disability they experience 0 to 10. 0 signifies no disability at all, 10 signifies that these activities are totally disrupted by pain.      Medical Necessity:   · Patient is expected to demonstrate progress in range of motion, coordination and functional technique to return to painfree ADL's, work activities and recreation. Reason for Services/Other Comments:  · Patient continues to require skilled intervention due to above mentioned deficits. Use of outcome tool(s) and clinical judgement create a POC that gives a: Questionable prediction of patient's progress: MODERATE COMPLEXITY            TREATMENT:   (In addition to Assessment/Re-Assessment sessions the following treatments were rendered)    Pre-treatment Symptoms/Complaints: Very difficult weekend. Had a massage at the end of last week and that worsened cuff pain, then had intercourse Vish night, as well as bad weather-all have aggravated symptoms. Currently, pain is located in the L lower quadrant. Pain: Initial: Pain Intensity 1: 7/10 Post Session:  9/10     THERAPEUTIC EXERCISE: (10 minutes):  Exercises per grid below to improve mobility and coordination. Required moderate verbal and tactile cues to promote proper performance of exercise. Progressed repetitions and complexity of movement as indicated. Date:  3/13/2018     Activity/Exercise Parameters   Pelvic Floor Drops 2 minutes   Diaphragmatic Breathing 2 minutes   Prone Press Ups    Cat/Camel    Adductor stretch 2 minutes   Central Sensitization 2 minutes   Anatomy/Pathology 2 minutes   Home Exercise Program Drops, DKTC/Adductor Stretch, Pelvic Mobility, Dilators     MANUAL THERAPY: (60 minutes): Joint mobilization and Soft tissue mobilization was utilized and necessary because of the patient's painful spasm. (Used abbreviations: MET - muscle energy technique; SCS- Strain counter strain; CTM- Connective tissue mobilizations; CR- Contract/relax; SP- Sustained pressure).    - CTM and IASTM to B adductors and ischiorectal fossa  - Pudendal nerve glides B  - Superficial PFM release (externally)  - CTM to suprapubic region  - Bladder mobilizations and descending colon mobilizations in sidelying  - Iliacus release on L  - rectus abdominus mobilizations    MODALITIES: (10 minutes): *  Hot Pack Therapy in order to provide analgesia and relieve muscle spasm. (Applied suprapubically during internal MT. DOES NOT CONTRIBUTE TO TOTAL TIME)    Treatment/Session Assessment:    · Response to Treatment:  Significant increase in pain following PT, however dramatic improvements in L innominate mobility, as well as abdominal wall movement. Discussed central sensitization and the need to aggressively treat the L lower quadrant (including bladder, descending colon, iliacus, psoas and ovary) prior to surgery. Due to recent findings, would like to increase frequency to 1-2 x weekly for 3 weeks in preparation for surgery. · Compliance with Program/Exercises: Compliant with downtraining and flexibility at this time. · Recommendations/Intent for next treatment session: \"Next visit will focus on internal, abdominal work (bladder mobs, iliacus, descending colon- sidelying)\".  COMPLETE [place]    Total Treatment Duration: 70 minutes, 5 minutes regla/doff clothing  PT Patient Time In/Time Out  Time In: 0900  Time Out: 1015    Tristan Frank PT

## 2018-03-16 ENCOUNTER — HOSPITAL ENCOUNTER (OUTPATIENT)
Dept: PHYSICAL THERAPY | Age: 33
Discharge: HOME | End: 2018-03-16
Payer: COMMERCIAL

## 2018-03-16 PROCEDURE — 97140 MANUAL THERAPY 1/> REGIONS: CPT

## 2018-03-16 PROCEDURE — 97110 THERAPEUTIC EXERCISES: CPT

## 2018-03-16 NOTE — PROGRESS NOTES
Anastasiya Fernández Bob  : 1985  Payor: Rick Gudino / Plan: [place] BL[place] FEDER[place] / Product Type: PPO /  [facility] at [address] [place] [address], [address] [place] [address]  [phone]   [phone]          OUTPATIENT PHYSICAL THERAPY:Daily Note 3/16/2018    ICD-10: Treatment Diagnosis: Chronic pelvic pain (R10.2); spasm of muscle (M62.838); Pelvic floor dysfunction (M62.89)  Precautions/Allergies:   Codeine and Dermagraft [cult skin subst, human-bovine]   Fall Risk Score: 0 (? 5 = High Risk)  MD Orders: Evaluate and treat MEDICAL/REFERRING DIAGNOSIS:  Pelvic floor dysfunction [M62.89]   DATE OF ONSET: Chronic endometriosis since menstruation  REFERRING PHYSICIAN: Husam Berumen, *  RETURN PHYSICIAN APPOINTMENT: TBD     INITIAL ASSESSMENT:  Ms. Camacho Nicely presents with significant pelvic floor muscle (PFM) over activity and lack of coordination, as well as connective tissue restrictions and trigger points in her adductors, abdominal wall and posterior chain; this results in chronic pelvic pain. She also demonstrates weakness throughout her core musculature and pelvic girdle. This is contributing to her difficulty with all ADL's and physical activity. I believe she will benefit from skilled PT, with an emphasis on manual therapy and muscle retraining, strength, and coordination to improve her ability to perform housework, , work responsibilities and physical activity without pain or difficulty. She is pleasant, motivated and eager to return to her prior level of function. Progress may be imited, however, due to the chronic and severe nature of her pain. RE-CERTIFICATION, PROGRESS NOTE 2018: Farhat Mcnulty has been seen in PT for 9 sessions since 2017. Treatment has emphasized manual therapy throughout the pelvic girdle, both internally and externally, as well as PFM down training and sexual and bowel/bladder health education.  She is able to tolerate more internal PFM physical therapy, but continues to report significant pain at levator ani and obturator internus B. All PFM on L reproduce/radiate into her L lower quadrant. She receives temporary relief in her pelvic pain following PT. However, her surgeons believe that she is having a re-occurrence of endometriosis and she is schedule to undergo surgery on April 4, 2018 for excision of endometriosis and removal of her L ovary. Because of this, I believe she will continue to benefit from skilled PT to manage her pain and prepare her body and musculature for surgery. She is pleasant and motivated and using dilators at home. PROBLEM LIST (Impacting functional limitations):  1. Decreased Strength  2. Decreased ADL/Functional Activities  3. Increased Pain  4. Decreased Activity Tolerance INTERVENTIONS PLANNED:  1. Electrical Stimulation  2. Home Exercise Program (HEP)  3. Therapeutic Activites  4. Therapeutic Exercise/Strengthening   TREATMENT PLAN:  Effective Dates: 3/13/2018 TO 4/3/2018 . Frequency/Duration: 1-2 x weekly for 3 weeks    GOALS: (Goals have been discussed and agreed upon with patient.)    Short-Term Functional Goals: Time Frame: 6 weeks  1. Pt will demonstrate N voluntary relaxation of PFM to improve her ability to urinate without hesitancy or incomplete emptying. (ONGOING)  2. Pt will demonstrate I with basic PFM HEP to improve resting tone and decrease pain with prolonged sitting. (MET 1/4/2018)    Discharge Goals: Time Frame: 12 weeks  1. Pt will reports < 2/10 pain with 1 finger palpation of PFM and obturator internus (OI); to initiate pain free intercourse with spouse. (ONGOING)  2. Pt will demonstrate appropriate co contraction of the Transversus Abdominus and Pelvic Floor muscle group (without excessive compensation), and maintain x 60 seconds to improve core stability and return to dynamic physical activity such as running and jumping. (ONGOING)  3.  Pt will tolerate dilator training with < 3/10 pain to initiate at home and improve tenderness in PFM to return to pain free sexual activity with spouse. (ONGOING)    Rehabilitation Potential For Stated Goals: Good                The information in this section was collected on 11/7/2017 (except where otherwise noted). HISTORY:   History of Present Injury/Illness (Reason for Referral):  Pt with a history of chronic endometriosis. This has resulted in > 10 surgeries to remove, including hysterectomy (has ovaries) and excision in November 2016. Pain comes and goes, but is always present. Recent worsening of pain in beginning of November. Urinary: Complains of hesitancy and incomplete emptying. Takes ~ 15-30 seconds to start stream. Denies UI or dysuria at this time,  Bowel: History of constipation managed with OTC medications. However, recently has been going daily with push/strain. Sexual: Sex has always been painful. Currently, pain during is 6/10 (diffuse in the vagina) and 8/10 after (throughout the pelvic girdle). 10/10 if he \"hits my cuff\". Pelvic Organ Prolapse/Pelvic Pain: Daily pelvic pain reported in L LQ. Sharp, shooting down anterior thigh on L. Can radiate up to ribs. Best pain is 6/10 while on pain medication, worst 10/10. Nothing seems to make pain better other than meds, prolonged sitting or standing, as well as ADL's increase pain. RE-CERTIFICATION/PROGRESS NOTE 2/8/2018: Urinary hesitancy has persisted since starting PT. Bowel function continues to be limited, and requires OTC medication and push/strain to empty. [place] has improved slightly since returning to PT. Pain is present constantly, but severity has decreased to a 5/10 and the length of pain following PT has decreased to < 1 day. Daily pelvic pain is present. Relief in severity to < 5/10 following PT (for a few days), then returns to 7/10 (consistently) in the L lower quadrant. Patient states pain feels like \"ovary pain\".      Past Medical History/Comorbidities:   Ms. Pretty Aritaots has a past medical history of Anxiety (9/6/2013); Endometriosis; Gluten intolerance; Migraine; Nausea & vomiting; Ovarian cyst; PUD (peptic ulcer disease) (12/2013 ); Unspecified adverse effect of anesthesia; UTI (urinary tract infection); and Vaginal cuff dehiscence. Ms. Karie Gentile  has a past surgical history that includes hx hysteroscopy; pr endometrial cryoablation; hx other surgical; hx bilateral salpingectomy; hx other surgical (11/2016); hx total laparoscopic hysterectomy; hx breast augmentation (2003); hx gyn (2000, 2004); hx dilation and curettage; and hx pelvic laparoscopy (11/03/2016). Social History/Living Environment:     Pt lives in 2 story home with spouse and 3 children    Prior Level of Function/Work/Activity:  Works part time at [employer]    Current Medications:       Current Outpatient Prescriptions:     ALPRAZolam (XANAX) 0.25 mg tablet, Take 0.25 mg by mouth now., Disp: , Rfl:     UBIDECARENONE/VITAMIN E MIXED (COQ10  PO), Take  by mouth nightly., Disp: , Rfl:     VIT B2/NIACIN/B6/B12/DEXPANTH (B COMPLEX SL), by SubLINGual route daily. , Disp: , Rfl:     carisoprodol (SOMA) 250 mg tablet, Take 250 mg by mouth four (4) times daily. Pt takes only 1 x/d at most at [facility], Disp: , Rfl:     minocycline (MINOCIN, DYNACIN) 100 mg capsule, Take  by mouth every Monday, Wednesday, Friday. Pt takes twice a day on these days For antiinflammatory in connection with endometriosis, Disp: , Rfl:     traMADol (ULTRAM) 50 mg tablet, Take 1-2 Tabs by mouth every six (6) hours as needed for Pain. Max Daily Amount: 400 mg., Disp: 60 Tab, Rfl: 0    clonazePAM (KLONOPIN) 0.5 mg tablet, Take  by mouth nightly as needed. , Disp: , Rfl:     amitriptyline (ELAVIL) 25 mg tablet, Take 1 Tab by mouth nightly.  Indications: NEUROPATHIC PAIN, Disp: 30 Tab, Rfl: 11    gabapentin (NEURONTIN) 100 mg capsule, TAKE THREE CAPSULES BY MOUTH EVERY DAY (Patient taking differently: TAKE THREE CAPSULES BY MOUTH EVERY DAY at HS), Disp: 90 Cap, Rfl: 6    citalopram (CELEXA) 10 mg tablet, Take 20 mg by mouth every evening., Disp: , Rfl:     promethazine (PHENERGAN) 25 mg tablet, Take 25 mg by mouth every six (6) hours as needed for Nausea., Disp: , Rfl:     ibuprofen (MOTRIN) 800 mg tablet, Take 1 Tab by mouth every six (6) hours as needed. (Patient taking differently: Take 800 mg by mouth every six (6) hours as needed. Last dose 12/17/17), Disp: 30 Tab, Rfl: 0    magnesium oxide (MAG-OX) 400 mg tablet, Take 400 mg by mouth daily. , Disp: , Rfl:     melatonin 3 mg tablet, Take 5 mg by mouth nightly., Disp: , Rfl:     loratadine (CLARITIN) 10 mg tablet, Take 10 mg by mouth every evening., Disp: , Rfl:     LACTOBACILLUS ACIDOPHILUS (PROBIOTIC PO), Take  by mouth., Disp: , Rfl:     multivitamin (ONE A DAY) tablet, Take 1 Tab by mouth daily. , Disp: , Rfl:    Date Last Reviewed:  3/16/2018     Number of Personal Factors/Comorbidities that affect the Plan of Care: 1-2: MODERATE COMPLEXITY   EXAMINATION:   UPDATED 2/8/2018:  Palpation:          Adductors: MILD connective tissue (CT) restrictions ON R, MODERATE ON L with pain and wincing on L. Pudendal nerve glides significantly tender to palpation (TTP) on L, moderately painful on         R. Thoracic Spine: T10-12 refers pain towards vaginal cuff, however mobility is WNL. PFM via vaginal canal: L superficial PFM non tender locally, but with sustained pressure refer towards L lower quadrant. All levator ani and OI TTP B, with severe pain reported diffusely throughout       vagina, into LB and L LQ.  ROM:          Pelvic mobility: WNL and painfree. Lumbar Mobility: WNL and painfree. Strength: Of PFM via SEMG: Resting tone generally 10 mV, but with MT and drops patient is able to achieve < 1.2 mV. Superficial PFM via vaginal canal: Power at least 3/5 with moderate delay in relaxation.  Transverse Abdominus (TA): Good with strong PFM co contraction    Objective Findings from 3/13: In standing, pt demonstrates R femoral IR, R genu recurvatum, elevation of R iliac crest, L innominate ER and anterior rotation. Minimal flex/ext of L innominate in standing swing test. Palpation: Unable to palpate L ovary, significant restrictions of bladder mobility in R sidelying (towards 11:00), moderate restrictions of descending colon over iliacus. Visible restrictions of L hysterectomy scar, as well as at umbilicus. Difficulty  L rectus abdominus (at umbilicus) form external obliques. Atrophy of R gluteus medius noted as well. Body Structures Involved:  1. Muscles Body Functions Affected:  1. Reproductive  2. Neuromusculoskeletal  3. Movement Related Activities and Participation Affected:  1. General Tasks and Demands  2. Mobility  3. Self Care  4. Interpersonal Interactions and Relationships   Number of elements (examined above) that affect the Plan of Care: 3: MODERATE COMPLEXITY   CLINICAL PRESENTATION:   Presentation: Evolving clinical presentation with changing clinical characteristics: MODERATE COMPLEXITY   CLINICAL DECISION MAKING:   Outcome Measure: Tool Used: Pain Disability Index  Score:  Initial: 11/7/2017; 42 Most Recent: TBD   Interpretation of Score: The rating scale measures the degree to which aspects of your life are disrupted by chronic pain. For each of the 7 categories, patient circles the level of disability they experience 0 to 10. 0 signifies no disability at all, 10 signifies that these activities are totally disrupted by pain. Medical Necessity:   · Patient is expected to demonstrate progress in range of motion, coordination and functional technique to return to painfree ADL's, work activities and recreation. Reason for Services/Other Comments:  · Patient continues to require skilled intervention due to above mentioned deficits.    Use of outcome tool(s) and clinical judgement create a POC that gives a: Questionable prediction of patient's progress: MODERATE COMPLEXITY            TREATMENT:   (In addition to Assessment/Re-Assessment sessions the following treatments were rendered)    Pre-treatment Symptoms/Complaints: Significant pain following last session, but noted improvements in ROM. Pain: Initial: Pain Intensity 1: 6/10 Post Session:  6/10     THERAPEUTIC EXERCISE: (10 minutes):  Exercises per grid below to improve mobility and coordination. Required moderate verbal and tactile cues to promote proper performance of exercise. Progressed repetitions and complexity of movement as indicated. Date:  3/16/2018     Activity/Exercise Parameters   Pelvic Floor Drops 2 minutes   Diaphragmatic Breathing 2 minutes   DKTC 2 minutes   Deep Squat 2 minutes   Adductor stretch 2 minutes   Central Sensitization    Home Exercise Program Drops, DKTC/Adductor Stretch, Pelvic Mobility, Dilators     MANUAL THERAPY: (50 minutes): Joint mobilization and Soft tissue mobilization was utilized and necessary because of the patient's painful spasm. (Used abbreviations: MET - muscle energy technique; SCS- Strain counter strain; CTM- Connective tissue mobilizations; CR- Contract/relax; SP- Sustained pressure). - CTM and IASTM to B adductors and ischiorectal fossa  - Pudendal nerve glides B  - Superficial PFM release (externally)  - CTM to suprapubic region  - Bladder mobilizations and descending colon mobilizations in sidelying  - Iliacus release on L  - Rectus abdominus mobilizations  - TDN to B piriformis and adductors in prone    MODALITIES: (10 minutes): *  Hot Pack Therapy in order to provide analgesia and relieve muscle spasm. (Applied suprapubically during internal MT. DOES NOT CONTRIBUTE TO TOTAL TIME)    Treatment/Session Assessment:    · Response to Treatment:  Tolerated increased abdominal mobilizations, as well as internal periurethral mobs. Not nearly as provacative as last session.     · Compliance with Program/Exercises: Compliant with downtraining and flexibility at this time. · Recommendations/Intent for next treatment session: \"Next visit will focus on internal, abdominal work (bladder mobs, iliacus, descending colon- sidelying)\".  COMPLETE [place]    Total Treatment Duration: 60 minutes, 5 minutes regla/doff clothing  PT Patient Time In/Time Out  Time In: 1000  Time Out: Andrews Santos PT

## 2018-03-22 ENCOUNTER — APPOINTMENT (OUTPATIENT)
Dept: PHYSICAL THERAPY | Age: 33
End: 2018-03-22
Payer: COMMERCIAL

## 2018-03-23 ENCOUNTER — HOSPITAL ENCOUNTER (OUTPATIENT)
Dept: PHYSICAL THERAPY | Age: 33
Discharge: HOME | End: 2018-03-23
Payer: COMMERCIAL

## 2018-03-23 PROCEDURE — 97110 THERAPEUTIC EXERCISES: CPT

## 2018-03-23 PROCEDURE — 97140 MANUAL THERAPY 1/> REGIONS: CPT

## 2018-03-23 NOTE — PROGRESS NOTES
Akila Rojas  : 1985  Payor: [employer] / Plan: [place] / Product Type: PPO /  [facility] at [address] [place] [address], [address] [place] [address]  [phone]   [phone]          OUTPATIENT PHYSICAL THERAPY:Daily Note 3/23/2018    ICD-10: Treatment Diagnosis: Chronic pelvic pain (R10.2); spasm of muscle (M62.838); Pelvic floor dysfunction (M62.89)  Precautions/Allergies:   Codeine and Dermagraft [cult skin subst, human-bovine]   Fall Risk Score: 0 (? 5 = High Risk)  MD Orders: Evaluate and treat MEDICAL/REFERRING DIAGNOSIS:  Pelvic floor dysfunction [M62.89]   DATE OF ONSET: Chronic endometriosis since menstruation  REFERRING PHYSICIAN: Tonia Fleischer, *  RETURN PHYSICIAN APPOINTMENT: TBD     INITIAL ASSESSMENT:  Ms. Jt Reed presents with significant pelvic floor muscle (PFM) over activity and lack of coordination, as well as connective tissue restrictions and trigger points in her adductors, abdominal wall and posterior chain; this results in chronic pelvic pain. She also demonstrates weakness throughout her core musculature and pelvic girdle. This is contributing to her difficulty with all ADL's and physical activity. I believe she will benefit from skilled PT, with an emphasis on manual therapy and muscle retraining, strength, and coordination to improve her ability to perform housework, , work responsibilities and physical activity without pain or difficulty. She is pleasant, motivated and eager to return to her prior level of function. Progress may be imited, however, due to the chronic and severe nature of her pain. RE-CERTIFICATION, PROGRESS NOTE 2018: Jomar Edwards has been seen in PT for 9 sessions since 2017. Treatment has emphasized manual therapy throughout the pelvic girdle, both internally and externally, as well as PFM down training and sexual and bowel/bladder health education.  She is able to tolerate more internal PFM physical therapy, but continues to report significant pain at levator ani and obturator internus B. All PFM on L reproduce/radiate into her L lower quadrant. She receives temporary relief in her pelvic pain following PT. However, her surgeons believe that she is having a re-occurrence of endometriosis and she is schedule to undergo surgery on April 4, 2018 for excision of endometriosis and removal of her L ovary. Because of this, I believe she will continue to benefit from skilled PT to manage her pain and prepare her body and musculature for surgery. She is pleasant and motivated and using dilators at home. PROBLEM LIST (Impacting functional limitations):  1. Decreased Strength  2. Decreased ADL/Functional Activities  3. Increased Pain  4. Decreased Activity Tolerance INTERVENTIONS PLANNED:  1. Electrical Stimulation  2. Home Exercise Program (HEP)  3. Therapeutic Activites  4. Therapeutic Exercise/Strengthening   TREATMENT PLAN:  Effective Dates: 3/13/2018 TO 4/3/2018 . Frequency/Duration: 1-2 x weekly for 3 weeks    GOALS: (Goals have been discussed and agreed upon with patient.)    Short-Term Functional Goals: Time Frame: 6 weeks  1. Pt will demonstrate N voluntary relaxation of PFM to improve her ability to urinate without hesitancy or incomplete emptying. (ONGOING)  2. Pt will demonstrate I with basic PFM HEP to improve resting tone and decrease pain with prolonged sitting. (MET 1/4/2018)    Discharge Goals: Time Frame: 12 weeks  1. Pt will reports < 2/10 pain with 1 finger palpation of PFM and obturator internus (OI); to initiate pain free intercourse with spouse. (ONGOING)  2. Pt will demonstrate appropriate co contraction of the Transversus Abdominus and Pelvic Floor muscle group (without excessive compensation), and maintain x 60 seconds to improve core stability and return to dynamic physical activity such as running and jumping. (ONGOING)  3.  Pt will tolerate dilator training with < 3/10 pain to initiate at home and improve tenderness in PFM to return to pain free sexual activity with spouse. (ONGOING)    Rehabilitation Potential For Stated Goals: Good                The information in this section was collected on 11/7/2017 (except where otherwise noted). HISTORY:   History of Present Injury/Illness (Reason for Referral):  Pt with a history of chronic endometriosis. This has resulted in > 10 surgeries to remove, including hysterectomy (has ovaries) and excision in November 2016. Pain comes and goes, but is always present. Recent worsening of pain in beginning of November. Urinary: Complains of hesitancy and incomplete emptying. Takes ~ 15-30 seconds to start stream. Denies UI or dysuria at this time,  Bowel: History of constipation managed with OTC medications. However, recently has been going daily with push/strain. Sexual: Sex has always been painful. Currently, pain during is 6/10 (diffuse in the vagina) and 8/10 after (throughout the pelvic girdle). 10/10 if he \"hits my cuff\". Pelvic Organ Prolapse/Pelvic Pain: Daily pelvic pain reported in L LQ. Sharp, shooting down anterior thigh on L. Can radiate up to ribs. Best pain is 6/10 while on pain medication, worst 10/10. Nothing seems to make pain better other than meds, prolonged sitting or standing, as well as ADL's increase pain. RE-CERTIFICATION/PROGRESS NOTE 2/8/2018: Urinary hesitancy has persisted since starting PT. Bowel function continues to be limited, and requires OTC medication and push/strain to empty. [place] has improved slightly since returning to PT. Pain is present constantly, but severity has decreased to a 5/10 and the length of pain following PT has decreased to < 1 day. Daily pelvic pain is present. Relief in severity to < 5/10 following PT (for a few days), then returns to 7/10 (consistently) in the L lower quadrant. Patient states pain feels like \"ovary pain\".      Past Medical History/Comorbidities:   Ms. Otilia Treviño has a past medical history of Anxiety (9/6/2013); Endometriosis; Gluten intolerance; Migraine; Nausea & vomiting; Ovarian cyst; PUD (peptic ulcer disease) (12/2013 ); Unspecified adverse effect of anesthesia; UTI (urinary tract infection); and Vaginal cuff dehiscence. Ms. Gil Quinones  has a past surgical history that includes hx hysteroscopy; pr endometrial cryoablation; hx other surgical; hx bilateral salpingectomy; hx other surgical (11/2016); hx total laparoscopic hysterectomy; hx breast augmentation (2003); hx gyn (2000, 2004); hx dilation and curettage; and hx pelvic laparoscopy (11/03/2016). Social History/Living Environment:     Pt lives in 2 story home with spouse and 3 children    Prior Level of Function/Work/Activity:  Works part time at [employer]    Current Medications:       Current Outpatient Prescriptions:     ALPRAZolam (XANAX) 0.25 mg tablet, Take 0.25 mg by mouth now., Disp: , Rfl:     UBIDECARENONE/VITAMIN E MIXED (COQ10  PO), Take  by mouth nightly., Disp: , Rfl:     VIT B2/NIACIN/B6/B12/DEXPANTH (B COMPLEX SL), by SubLINGual route daily. , Disp: , Rfl:     carisoprodol (SOMA) 250 mg tablet, Take 250 mg by mouth four (4) times daily. Pt takes only 1 x/d at [facility] at [facility], Disp: , Rfl:     minocycline (MINOCIN, DYNACIN) 100 mg capsule, Take  by mouth every Monday, Wednesday, Friday. Pt takes twice a day on these days For antiinflammatory in connection with endometriosis, Disp: , Rfl:     traMADol (ULTRAM) 50 mg tablet, Take 1-2 Tabs by mouth every six (6) hours as needed for Pain. Max Daily Amount: 400 mg., Disp: 60 Tab, Rfl: 0    clonazePAM (KLONOPIN) 0.5 mg tablet, Take  by mouth nightly as needed. , Disp: , Rfl:     amitriptyline (ELAVIL) 25 mg tablet, Take 1 Tab by mouth nightly.  Indications: NEUROPATHIC PAIN, Disp: 30 Tab, Rfl: 11    gabapentin (NEURONTIN) 100 mg capsule, TAKE THREE CAPSULES BY MOUTH EVERY DAY (Patient taking differently: TAKE THREE CAPSULES BY MOUTH EVERY DAY at HS), Disp: 90 Cap, Rfl: 6    citalopram (CELEXA) 10 mg tablet, Take 20 mg by mouth every evening., Disp: , Rfl:     promethazine (PHENERGAN) 25 mg tablet, Take 25 mg by mouth every six (6) hours as needed for Nausea., Disp: , Rfl:     ibuprofen (MOTRIN) 800 mg tablet, Take 1 Tab by mouth every six (6) hours as needed. (Patient taking differently: Take 800 mg by mouth every six (6) hours as needed. Last dose 12/17/17), Disp: 30 Tab, Rfl: 0    magnesium oxide (MAG-OX) 400 mg tablet, Take 400 mg by mouth daily. , Disp: , Rfl:     melatonin 3 mg tablet, Take 5 mg by mouth nightly., Disp: , Rfl:     loratadine (CLARITIN) 10 mg tablet, Take 10 mg by mouth every evening., Disp: , Rfl:     LACTOBACILLUS ACIDOPHILUS (PROBIOTIC PO), Take  by mouth., Disp: , Rfl:     multivitamin (ONE A DAY) tablet, Take 1 Tab by mouth daily. , Disp: , Rfl:    Date Last Reviewed:  3/23/2018     Number of Personal Factors/Comorbidities that affect the Plan of Care: 1-2: MODERATE COMPLEXITY   EXAMINATION:   UPDATED 2/8/2018:  Palpation:          Adductors: MILD connective tissue (CT) restrictions ON R, MODERATE ON L with pain and wincing on L. Pudendal nerve glides significantly tender to palpation (TTP) on L, moderately painful on         R. Thoracic Spine: T10-12 refers pain towards vaginal cuff, however mobility is WNL. PFM via vaginal canal: L superficial PFM non tender locally, but with sustained pressure refer towards L lower quadrant. All levator ani and OI TTP B, with severe pain reported diffusely throughout       vagina, into LB and L LQ.  ROM:          Pelvic mobility: WNL and painfree. Lumbar Mobility: WNL and painfree. Strength: Of PFM via SEMG: Resting tone generally 10 mV, but with MT and drops patient is able to achieve < 1.2 mV. Superficial PFM via vaginal canal: Power at least 3/5 with moderate delay in relaxation.  Transverse Abdominus (TA): Good with strong PFM co contraction    Objective Findings from 3/13: In standing, pt demonstrates R femoral IR, R genu recurvatum, elevation of R iliac crest, L innominate ER and anterior rotation. Minimal flex/ext of L innominate in standing swing test. Palpation: Unable to palpate L ovary, significant restrictions of bladder mobility in R sidelying (towards 11:00), moderate restrictions of descending colon over iliacus. Visible restrictions of L hysterectomy scar, as well as at umbilicus. Difficulty  L rectus abdominus (at umbilicus) form external obliques. Atrophy of R gluteus medius noted as well. Body Structures Involved:  1. Muscles Body Functions Affected:  1. Reproductive  2. Neuromusculoskeletal  3. Movement Related Activities and Participation Affected:  1. General Tasks and Demands  2. Mobility  3. Self Care  4. Interpersonal Interactions and Relationships   Number of elements (examined above) that affect the Plan of Care: 3: MODERATE COMPLEXITY   CLINICAL PRESENTATION:   Presentation: Evolving clinical presentation with changing clinical characteristics: MODERATE COMPLEXITY   CLINICAL DECISION MAKING:   Outcome Measure: Tool Used: Pain Disability Index  Score:  Initial: 11/7/2017; 42 Most Recent: 3/23/2018; 44   Interpretation of Score: The rating scale measures the degree to which aspects of your life are disrupted by chronic pain. For each of the 7 categories, patient circles the level of disability they experience 0 to 10. 0 signifies no disability at all, 10 signifies that these activities are totally disrupted by pain. Medical Necessity:   · Patient is expected to demonstrate progress in range of motion, coordination and functional technique to return to painfree ADL's, work activities and recreation. Reason for Services/Other Comments:  · Patient continues to require skilled intervention due to above mentioned deficits.    Use of outcome tool(s) and clinical judgement create a POC that gives a: Questionable prediction of patient's progress: MODERATE COMPLEXITY            TREATMENT:   (In addition to Assessment/Re-Assessment sessions the following treatments were rendered)    Pre-treatment Symptoms/Complaints: Seems ot have put herself in a flare by over doing it a few days ago, and this weather usually makes her pain worse. Pain: Initial: Pain Intensity 1: 6/10 Post Session:  6/10     THERAPEUTIC EXERCISE: (10 minutes):  Exercises per grid below to improve mobility and coordination. Required moderate verbal and tactile cues to promote proper performance of exercise. Progressed repetitions and complexity of movement as indicated. Date:  3/23/2018     Activity/Exercise Parameters   Pelvic Floor Drops 2 minutes   Diaphragmatic Breathing 2 minutes   DKTC    Deep Squat    Adductor stretch 2 minutes   Spouse Education 4 minutes   Home Exercise Program Drops, DKTC/Adductor Stretch, Pelvic Mobility, Dilators     MANUAL THERAPY: (55 minutes): Joint mobilization and Soft tissue mobilization was utilized and necessary because of the patient's painful spasm. (Used abbreviations: MET - muscle energy technique; SCS- Strain counter strain; CTM- Connective tissue mobilizations; CR- Contract/relax; SP- Sustained pressure). - CTM and IASTM to B adductors and ischiorectal fossa  - Pudendal nerve glides B  - Superficial PFM release (externally)  - CTM to suprapubic region  - Bladder mobilizations and descending colon mobilizations in sidelying  - Iliacus release B  - Rectus abdominus mobilizations  - TDN to B piriformis and adductors in prone    MODALITIES: (10 minutes): *  Hot Pack Therapy in order to provide analgesia and relieve muscle spasm. (Applied suprapubically during internal MT. DOES NOT CONTRIBUTE TO TOTAL TIME)    Treatment/Session Assessment:    · Response to Treatment:   present for spouse education for home MT to be performed.  Resting tone generally 5 mV prior to internal and abdominal work, following < 1.2 mV. Improved pelvic mobility following treatment    · Compliance with Program/Exercises: Compliant with downtraining and flexibility at this time. · Recommendations/Intent for next treatment session: \"Next visit will focus on internal, abdominal work (bladder mobs, iliacus, descending colon- sidelying)\".     Total Treatment Duration: 65 minutes, 5 minutes regla/doff clothing  PT Patient Time In/Time Out  Time In: 1100  Time Out: 1210    Jean-Pierre Liu, MALIA

## 2018-03-27 ENCOUNTER — HOSPITAL ENCOUNTER (OUTPATIENT)
Dept: PHYSICAL THERAPY | Age: 33
Discharge: HOME | End: 2018-03-27
Payer: COMMERCIAL

## 2018-03-27 PROCEDURE — 97110 THERAPEUTIC EXERCISES: CPT

## 2018-03-27 PROCEDURE — 97140 MANUAL THERAPY 1/> REGIONS: CPT

## 2018-03-27 NOTE — THERAPY DISCHARGE
Maik Ch Bob  : 1985  Payor: Chavo Corbett / Plan: [place] / Product Type: PPO /  Therapy Center at [address] [place] [address]  [phone]   [phone]          OUTPATIENT PHYSICAL THERAPY:Daily Note and Discharge 3/27/2018    ICD-10: Treatment Diagnosis: Chronic pelvic pain (R10.2); spasm of muscle (M62.838); Pelvic floor dysfunction (M62.89)  Precautions/Allergies:   Codeine and Dermagraft [cult skin subst, human-bovine]   Fall Risk Score: 0 (? 5 = High Risk)  MD Orders: Evaluate and treat MEDICAL/REFERRING DIAGNOSIS:  Pelvic floor dysfunction [M62.89]   DATE OF ONSET: Chronic endometriosis since menstruation  REFERRING PHYSICIAN: Arin Gunn, *  RETURN PHYSICIAN APPOINTMENT: TBD     INITIAL ASSESSMENT:  Ms. Eri Shepard presents with significant pelvic floor muscle (PFM) over activity and lack of coordination, as well as connective tissue restrictions and trigger points in her adductors, abdominal wall and posterior chain; this results in chronic pelvic pain. She also demonstrates weakness throughout her core musculature and pelvic girdle. This is contributing to her difficulty with all ADL's and physical activity. I believe she will benefit from skilled PT, with an emphasis on manual therapy and muscle retraining, strength, and coordination to improve her ability to perform housework, , work responsibilities and physical activity without pain or difficulty. She is pleasant, motivated and eager to return to her prior level of function. Progress may be imited, however, due to the chronic and severe nature of her pain. RE-CERTIFICATION, PROGRESS NOTE 2018: Lupillo Riojas has been seen in PT for 9 sessions since 2017. Treatment has emphasized manual therapy throughout the pelvic girdle, both internally and externally, as well as PFM down training and sexual and bowel/bladder health education.  She is able to tolerate more internal PFM physical therapy, but continues to report significant pain at levator ani and obturator internus B. All PFM on L reproduce/radiate into her L lower quadrant. She receives temporary relief in her pelvic pain following PT. However, her surgeons believe that she is having a re-occurrence of endometriosis and she is schedule to undergo surgery on April 4, 2018 for excision of endometriosis and removal of her L ovary. Because of this, I believe she will continue to benefit from skilled PT to manage her pain and prepare her body and musculature for surgery. She is pleasant and motivated and using dilators at home. DISCHARGE 3/27/2018: Kiah Linares has been seen for 7 sessions since 2/8/2018. Continued emphasis has been on improving her tolerance for manual therapy, both internally and externally, as well as increasing her pelvic floor muscle mobility and decreasing her global pelvic pain. She has made minimal gains at this time and will be going to [facility] for another surgery for her endometriosis on 4/2/2018. She is pleased with her course of skilled PT, however continues to report chronic, daily, severe pain. PROBLEM LIST (Impacting functional limitations):  1. Decreased Strength  2. Decreased ADL/Functional Activities  3. Increased Pain  4. Decreased Activity Tolerance INTERVENTIONS PLANNED:  1. Electrical Stimulation  2. Home Exercise Program (HEP)  3. Therapeutic Activites  4. Therapeutic Exercise/Strengthening   TREATMENT PLAN:  Effective Dates: 3/13/2018 TO 4/3/2018 . Frequency/Duration: 1-2 x weekly for 3 weeks    GOALS: (Goals have been discussed and agreed upon with patient.)    Short-Term Functional Goals: Time Frame: 6 weeks  1. Pt will demonstrate N voluntary relaxation of PFM to improve her ability to urinate without hesitancy or incomplete emptying. (NOT MET)  2. Pt will demonstrate I with basic PFM HEP to improve resting tone and decrease pain with prolonged sitting.  (MET 1/4/2018)    Discharge Goals: Time Frame: 12 weeks  1. Pt will reports < 2/10 pain with 1 finger palpation of PFM and obturator internus (OI); to initiate pain free intercourse with spouse. (NOT MET)  2. Pt will demonstrate appropriate co contraction of the Transversus Abdominus and Pelvic Floor muscle group (without excessive compensation), and maintain x 60 seconds to improve core stability and return to dynamic physical activity such as running and jumping. (NOT MET)  3. Pt will tolerate dilator training with < 3/10 pain to initiate at home and improve tenderness in PFM to return to pain free sexual activity with spouse. (NOT MET)    Rehabilitation Potential For Stated Goals: Good                The information in this section was collected on 11/7/2017 (except where otherwise noted). HISTORY:   History of Present Injury/Illness (Reason for Referral):  Pt with a history of chronic endometriosis. This has resulted in > 10 surgeries to remove, including hysterectomy (has ovaries) and excision in November 2016. Pain comes and goes, but is always present. Recent worsening of pain in beginning of November. Urinary: Complains of hesitancy and incomplete emptying. Takes ~ 15-30 seconds to start stream. Denies UI or dysuria at this time,  Bowel: History of constipation managed with OTC medications. However, recently has been going daily with push/strain. Sexual: Sex has always been painful. Currently, pain during is 6/10 (diffuse in the vagina) and 8/10 after (throughout the pelvic girdle). 10/10 if he \"hits my cuff\". Pelvic Organ Prolapse/Pelvic Pain: Daily pelvic pain reported in L LQ. Sharp, shooting down anterior thigh on L. Can radiate up to ribs. Best pain is 6/10 while on pain medication, worst 10/10. Nothing seems to make pain better other than meds, prolonged sitting or standing, as well as ADL's increase pain. DISCHARGE 2/8/2018: Urinary hesitancy has persisted since starting PT.  Bowel function continues to be limited, and requires OTC medication and push/strain to empty. [place] has improved slightly since returning to PT. Pain is present constantly, but severity has decreased to a 5/10 and the length of pain following PT has decreased to < 1 day. Daily pelvic pain is present. Relief in severity to < 5/10 following PT (for a few days), then returns to 7/10 (consistently) in the L lower quadrant. Patient states pain feels like \"ovary pain\". Past Medical History/Comorbidities:   Ms. Christina Gonzalez  has a past medical history of Anxiety (9/6/2013); Endometriosis; Gluten intolerance; Migraine; Nausea & vomiting; Ovarian cyst; PUD (peptic ulcer disease) (12/2013 ); Unspecified adverse effect of anesthesia; UTI (urinary tract infection); and Vaginal cuff dehiscence. Ms. Christina Gonzalez  has a past surgical history that includes hx hysteroscopy; pr endometrial cryoablation; hx other surgical; hx bilateral salpingectomy; hx other surgical (11/2016); hx total laparoscopic hysterectomy; hx breast augmentation (2003); hx gyn (2000, 2004); hx dilation and curettage; and hx pelvic laparoscopy (11/03/2016). Social History/Living Environment:     Pt lives in 2 [place] home with spouse and 3 children    Prior Level of Function/Work/Activity:  Works part time at [employer]    Current Medications:       Current Outpatient Prescriptions:     ALPRAZolam (XANAX) 0.25 mg tablet, Take 0.25 mg by mouth now., Disp: , Rfl:     UBIDECARENONE/VITAMIN E MIXED (COQ10  PO), Take  by mouth nightly., Disp: , Rfl:     VIT B2/NIACIN/B6/B12/DEXPANTH (B COMPLEX SL), by SubLINGual route daily. , Disp: , Rfl:     carisoprodol (SOMA) 250 mg tablet, Take 250 mg by mouth four (4) times daily. Pt takes only 1 x/d at most at [facility], Disp: , Rfl:     minocycline (MINOCIN, DYNACIN) 100 mg capsule, Take  by mouth every Monday, Wednesday, Friday.  Pt takes twice a day on these days For antiinflammatory in connection with endometriosis, Disp: , Rfl:   traMADol (ULTRAM) 50 mg tablet, Take 1-2 Tabs by mouth every six (6) hours as needed for Pain. Max Daily Amount: 400 mg., Disp: 60 Tab, Rfl: 0    clonazePAM (KLONOPIN) 0.5 mg tablet, Take  by mouth nightly as needed. , Disp: , Rfl:     amitriptyline (ELAVIL) 25 mg tablet, Take 1 Tab by mouth nightly. Indications: NEUROPATHIC PAIN, Disp: 30 Tab, Rfl: 11    gabapentin (NEURONTIN) 100 mg capsule, TAKE THREE CAPSULES BY MOUTH EVERY DAY (Patient taking differently: TAKE THREE CAPSULES BY MOUTH EVERY DAY at HS), Disp: 90 Cap, Rfl: 6    citalopram (CELEXA) 10 mg tablet, Take 20 mg by mouth every evening., Disp: , Rfl:     promethazine (PHENERGAN) 25 mg tablet, Take 25 mg by mouth every six (6) hours as needed for Nausea., Disp: , Rfl:     ibuprofen (MOTRIN) 800 mg tablet, Take 1 Tab by mouth every six (6) hours as needed. (Patient taking differently: Take 800 mg by mouth every six (6) hours as needed. Last dose 12/17/17), Disp: 30 Tab, Rfl: 0    magnesium oxide (MAG-OX) 400 mg tablet, Take 400 mg by mouth daily. , Disp: , Rfl:     melatonin 3 mg tablet, Take 5 mg by mouth nightly., Disp: , Rfl:     loratadine (CLARITIN) 10 mg tablet, Take 10 mg by mouth every evening., Disp: , Rfl:     LACTOBACILLUS ACIDOPHILUS (PROBIOTIC PO), Take  by mouth., Disp: , Rfl:     multivitamin (ONE A DAY) tablet, Take 1 Tab by mouth daily. , Disp: , Rfl:    Date Last Reviewed:  3/27/2018     Number of Personal Factors/Comorbidities that affect the Plan of Care: 1-2: MODERATE COMPLEXITY   EXAMINATION:   UPDATED 3/28/2018:  Palpation:          Adductors: MILD connective tissue (CT) restrictions ON R, MODERATE ON L with pain and wincing on L. Pudendal nerve glides significantly tender to palpation (TTP) on L, moderately painful on         R. Thoracic Spine: T10-12 refers pain towards vaginal cuff, however mobility is WNL.         PFM via vaginal canal: L superficial PFM non tender locally, but with sustained pressure refer towards L lower quadrant. All levator ani and OI TTP B, with severe pain reported diffusely throughout       vagina, into LB and L LQ.  ROM:          Pelvic mobility: WNL and painfree. Lumbar Mobility: WNL and painfree. Strength: Of PFM via SEMG: Resting tone generally 10 mV, but with MT and drops patient is able to achieve < 1.2 mV. Superficial PFM via vaginal canal: Power at least 3/5 with moderate delay in relaxation. Transverse Abdominus (TA): Good with strong PFM co contraction    Objective Findings from 3/13: In standing, pt demonstrates R femoral IR, R genu recurvatum, elevation of R iliac crest, L innominate ER and anterior rotation. Minimal flex/ext of L innominate in standing swing test. Palpation: Unable to palpate L ovary, significant restrictions of bladder mobility in R sidelying (towards 11:00), moderate restrictions of descending colon over iliacus. Visible restrictions of L hysterectomy scar, as well as at umbilicus. Difficulty  L rectus abdominus (at umbilicus) form external obliques. Atrophy of R gluteus medius noted as well. Body Structures Involved:  1. Muscles Body Functions Affected:  1. Reproductive  2. Neuromusculoskeletal  3. Movement Related Activities and Participation Affected:  1. General Tasks and Demands  2. Mobility  3. Self Care  4. Interpersonal Interactions and Relationships   Number of elements (examined above) that affect the Plan of Care: 3: MODERATE COMPLEXITY   CLINICAL PRESENTATION:   Presentation: Evolving clinical presentation with changing clinical characteristics: MODERATE COMPLEXITY   CLINICAL DECISION MAKING:   Outcome Measure: Tool Used: Pain Disability Index  Score:  Initial: 11/7/2017; 42 Most Recent: 3/23/2018; 44   Interpretation of Score: The rating scale measures the degree to which aspects of your life are disrupted by chronic pain.  For each of the 7 categories, patient circles the level of disability they experience 0 to 10. 0 signifies no disability at all, 10 signifies that these activities are totally disrupted by pain. Use of outcome tool(s) and clinical judgement create a POC that gives a: Questionable prediction of patient's progress: MODERATE COMPLEXITY            TREATMENT:   (In addition to Assessment/Re-Assessment sessions the following treatments were rendered)    Pre-treatment Symptoms/Complaints: Has a sinus infection and has been coughing a lot, which has made her pelvic pain worse. Pain: Initial: Pain Intensity 1: 7/10 Post Session:  6/10     THERAPEUTIC EXERCISE: (10 minutes):  Exercises per grid below to improve mobility and coordination. Required moderate verbal and tactile cues to promote proper performance of exercise. Progressed repetitions and complexity of movement as indicated. Date:  3/27/2018     Activity/Exercise Parameters   Pelvic Floor Drops 2 minutes   Diaphragmatic Breathing 2 minutes   DKTC    Deep Squat    Adductor stretch 2 minutes   PT POC, Maintenance Program 4 minutes   Home Exercise Program Drops, DKTC/Adductor Stretch, Pelvic Mobility, Dilators     MANUAL THERAPY: (50 minutes): Joint mobilization and Soft tissue mobilization was utilized and necessary because of the patient's painful spasm. (Used abbreviations: MET - muscle energy technique; SCS- Strain counter strain; CTM- Connective tissue mobilizations; CR- Contract/relax; SP- Sustained pressure). - CTM and IASTM to B adductors and ischiorectal fossa  - Pudendal nerve glides B  - Superficial PFM release (externally)  - CTM to suprapubic region  - Bladder mobilizations and descending colon mobilizations in sidelying  - Iliacus release B  - Rectus abdominus mobilizations  - TDN to B piriformis and adductors in prone    MODALITIES: (10 minutes): *  Hot Pack Therapy in order to provide analgesia and relieve muscle spasm. (Applied suprapubically during internal MT.  DOES NOT CONTRIBUTE TO TOTAL TIME)    Treatment/Session Assessment:    · Response to Treatment:  Pt with minimal gains in skilled PT over the past few weeks. Will actually be undergoing surgery on 4/2 due to chronic, persistent pain. She is D/C at this time. · Compliance with Program/Exercises: Compliant with downtraining and flexibility at this time.        Total Treatment Duration: 60 minutes, 5 minutes regla/doff clothing  PT Patient Time In/Time Out  Time In: 1100  Time Out: Reynaldo 24, PT

## 2018-03-29 ENCOUNTER — APPOINTMENT (OUTPATIENT)
Dept: PHYSICAL THERAPY | Age: 33
End: 2018-03-29
Payer: COMMERCIAL

## 2018-04-06 ENCOUNTER — APPOINTMENT (OUTPATIENT)
Dept: PHYSICAL THERAPY | Age: 33
End: 2018-04-06

## 2018-04-16 PROBLEM — N80.9 ENDOMETRIOSIS DETERMINED BY LAPAROSCOPY: Chronic | Status: ACTIVE | Noted: 2018-04-16

## 2018-09-26 PROBLEM — N94.89 HIGH-TONE PELVIC FLOOR DYSFUNCTION: Status: ACTIVE | Noted: 2018-09-26

## 2018-09-26 PROBLEM — R10.2 VAGINAL PAIN: Status: ACTIVE | Noted: 2018-09-26

## 2018-09-26 PROBLEM — R39.15 URINARY URGENCY: Status: ACTIVE | Noted: 2018-09-26

## 2018-09-26 PROBLEM — R39.89 BLADDER PAIN: Status: ACTIVE | Noted: 2018-09-26

## 2018-10-23 ENCOUNTER — HOSPITAL ENCOUNTER (OUTPATIENT)
Dept: SURGERY | Age: 33
Discharge: HOME | End: 2018-10-23

## 2018-10-26 VITALS — BODY MASS INDEX: 20.44 KG/M2 | HEIGHT: 69 IN | WEIGHT: 138 LBS

## 2018-10-26 RX ORDER — PREGABALIN 75 MG/1
100 CAPSULE ORAL 2 TIMES DAILY
COMMUNITY

## 2018-10-26 NOTE — PERIOP NOTES
Patient verified name, procedure, and . Order for consent NOT found in EHR, unable to confirm case posting at this time. Type 1B surgery, PAT phone assessment complete. Orders NOT received. Labs per surgeon: No orders received at this time. Labs per anesthesia protocol: None. Patient answered medical/surgical history questions at their best of ability. All prior to admission medications documented in [facility]. Patient instructed to take the following medications the day of surgery according to anesthesia guidelines with a small sip of water: Tylenol if needed, Methocarbamol, Lyrica, Phenergan if needed, and Tramadol if needed. Hold all vitamins 7 days prior to surgery and NSAIDS 5 days prior to surgery. Medications to be held: all non-prescribed vitamins/supplements/herbals and Ibuprofen. Patient instructed on the following:  Arrive at [address], time of arrival to be called the day before by 1700  NPO after midnight including gum, mints, and ice chips  Responsible adult must drive patient to the hospital, stay during surgery, and patient will  need supervision 24 hours after anesthesia  Use antibacterial soap in shower the night before surgery and on the morning of surgery  All piercings must be removed prior to arrival.    Leave all valuables (money and jewelry) at home but bring insurance card and ID on       DOS. Do not wear make-up, nail polish, lotions, cologne, perfumes, powders, or oil on skin. Patient teach back successful and patient demonstrates knowledge of instruction.

## 2018-10-29 ENCOUNTER — ANESTHESIA EVENT (OUTPATIENT)
Dept: SURGERY | Age: 33
End: 2018-10-29
Payer: COMMERCIAL

## 2018-10-29 NOTE — H&P
History and Physical 
 
Patient: Angela Greer MRN: [id number]  SSN: [id number] YOB: 1985  Age: 35 y.o. Sex: female Subjective:  
  
Angela Greer is a 35 y.o. female who . And has adopted 1. She has pelvic pain. She is going to PT currently. This started after laparoscopic hysterectomy that was done for endometriosis. Had a vaginal dehiscence after first time having sex post-op (Aug 2014). Has, been going to weekly PT since . Blood rerouting surgery 2/15 (done by vascular at [address] [place] [address]). 2016- excision of stage 3-4 endometriosis, appy and katelyn, vaginectomy and cuff revision. She had her left ovary removed in 2018. Went back to PT- pelvic floor botox.  she is going back to [facility] for amniofix surgery. She uses 5 valium suppository a few times per week. Uses dilators. Orgasms are painful. 
  
Has not had botox injections yet.  
  
2 pain docs helping with pelvic pain. Destinee Weldon Past Medical History:  
Diagnosis Date  Anxiety 2013  Bladder pain  Chronic pain   
 aback/pelvis related to endometriosis  Endometriosis  Gluten intolerance  High-tone pelvic floor dysfunction  IBS (irritable bowel syndrome)  Migraine  Nausea & vomiting   
 responds to IV antiemetic  Ovarian cyst   
 left  PUD (peptic ulcer disease) 2013   
 resolved per pt  Unspecified adverse effect of anesthesia   
 slow to go to sleep, wakes emotional  
 UTI (urinary tract infection)  Vaginal cuff dehiscence Past Surgical History:  
Procedure Laterality Date  ENDOMETRIAL CRYOABLATION    
 x4  
 HX BILATERAL SALPINGECTOMY  HX BREAST AUGMENTATION    HX DILATION AND CURETTAGE    
 HX GYN  ,   
 laparoscopy x2  HX OOPHORECTOMY Left  HX OTHER SURGICAL Vaginal Cuff repair x2  
 HX OTHER SURGICAL  2016 Vaginal reconstruction.  HX PELVIC LAPAROSCOPY  2016 For Endometriosis  HX TOTAL LAPAROSCOPIC HYSTERECTOMY Family History Problem Relation Age of Onset  Breast Cancer Mother 47  Breast Cancer Maternal Grandmother 72 Stage4  Elevated Lipids Maternal Uncle  No Known Problems Father Social History Tobacco Use  Smoking status: Never Smoker  Smokeless tobacco: Never Used Substance Use Topics  Alcohol use: Yes Comment: social  
  
Prior to Admission medications Medication Sig Start Date End Date Taking? Authorizing Provider  
pregabalin (LYRICA) 75 mg capsule Take 75 mg by mouth two (2) times a day. Provider, Historical  
sertraline (ZOLOFT) 100 mg tablet Take 150 mg by mouth nightly. Provider, Historical  
diazePAM (VALIUM) 10 mg tablet 10 mg. Vaginal suppository--uses every other night    Provider, Historical  
omega 3-DHA-EPA-fish oil 1,000 mg (120 mg-180 mg) capsule Take 1 Cap by mouth. Provider, Historical  
melatonin 5 mg cap capsule Take 10 mg by mouth nightly. Provider, Historical  
estradiol (ESTRACE) 1 mg tablet TK 1 T PO QHS 6/23/18   Provider, Historical  
methen-sod phos-meth blue-hyos (UROGESIC-BLUE) 81.6-40.8-0.12 mg tab May take 1-3 tabs per day as needed for bladder pain 9/26/18   Rober Strickland,   
methocarbamol (ROBAXIN) 500 mg tablet Take 500 mg by mouth two (2) times a day. Provider, Historical  
UBIDECARENONE/VITAMIN E MIXED (COQ10  PO) Take  by mouth nightly. Provider, Historical  
VIT B2/NIACIN/B6/B12/DEXPANTH (B COMPLEX SL) by SubLINGual route daily. Provider, Historical  
traMADol (ULTRAM) 50 mg tablet Take 1-2 Tabs by mouth every six (6) hours as needed for Pain. Max Daily Amount: 400 mg. 10/31/17   Zohreh Crenshaw MD  
clonazePAM [facility].) 0.5 mg tablet Take  by mouth nightly as needed. Provider, Historical  
promethazine (PHENERGAN) 25 mg tablet Take 25 mg by mouth every six (6) hours as needed for Nausea.     Other, MD Reena  
 ibuprofen (MOTRIN) 800 mg tablet Take 1 Tab by mouth every six (6) hours as needed. Patient taking differently: Take 800 mg by mouth every six (6) hours as needed. Last dose 12/17/17 5/31/14   Mika Dang MD  
magnesium oxide (MAG-OX) 400 mg tablet Take 400 mg by mouth daily. Provider, Historical  
loratadine (CLARITIN) 10 mg tablet Take 10 mg by mouth every evening. Provider, Historical  
LACTOBACILLUS ACIDOPHILUS (PROBIOTIC PO) Take  by mouth. Provider, Historical  
multivitamin (ONE A DAY) tablet Take 1 Tab by mouth daily. Provider, Historical  
  
 
Allergies Allergen Reactions  Codeine Hives  Dermagraft [Cult Skin Subst, Human-Bovine] Hives  Percocet [Oxycodone-Acetaminophen] Itching Review of Systems: A comprehensive review of systems was negative except for that written in the History of Present Illness. Objective: There were no vitals filed for this visit. Physical Exam: 
Constitutional: She is oriented to person, place, and time. Vital signs are normal. She appears well-developed and well-nourished. She is cooperative. No distress. HENT:  
Head: Normocephalic and atraumatic. Neck: Trachea normal. No thyroid mass present. Cardiovascular: Normal rate and normal heart sounds. Pulmonary/Chest: Effort normal. No accessory muscle usage. No respiratory distress. Abdominal: Soft. Normal appearance. She exhibits no distension and no mass. There is no hepatosplenomegaly. There is no tenderness. There is no rebound and no guarding. No hernia. Genitourinary: Pelvic exam was performed with patient supine. Musculoskeletal: She exhibits no edema or tenderness. Lymphadenopathy:  
  She has no cervical adenopathy. She has no axillary adenopathy. Neurological: She is alert and oriented to person, place, and time. Skin: Skin is warm and dry. No lesion and no rash noted. No erythema. Psychiatric: She has a normal mood and affect. Her speech is normal and behavior is normal. Judgment and thought content normal. Cognition and memory are normal. She does not express impulsivity or inappropriate judgment.  
  
  
Female Genitourinary Vulva: -  No lesions, increased sensitivity on the left v right. Bartholin's Gland - Bilateral - tender, no pain Skenes Gland- Bilateral- tender, no pain Clitoris - Normal.  
Introitus: Characteristics - Normal. Urethral Meatus: - Normal appearing, normal size, no lesions, no prolapse Urethra: No masses, + tenderness Vagina:  No atrophy, no discharge, no lesions, positive tenderness during exam. See below. Cervix: - abssent Uterus: - absent Adnexa: -s/p left oopherectomy Bladder - + tenderness, no masses palpated Perineum - no lesions Rectal  
Anorectal Exam: - normal  
  
  
Pelvic floor muscles: Tender Spasm     Contraction    
R. Puborectalis: Yes 4 /5   4 /5  
L. Puborectalis: Yes 5 /5   4 /5  
R. Pubococcyg Yes 4 /5   4 /5  
L. Pubococcyg Yes 5 /5   4 /5  
R. Ileococcyg: Yes 4 /5   4 /5  
L. Ileococcyg: Yes 5 /5   4 /5  
R. Obturator Int: Yes 3 /5   4 /5  
L. Obturator Int: Yes 3 /5   4 /5  
R. Coccygeus: Yes 2 /5   4 /5  
L. Coccygeus: Yes 2 /5   4 /5  
  
  
  
Supine Stress Test of MILE: 
Nagative 
  
Sensorineural Exam:  
            Bulvocavernosus reflex:  Normal                     
            Anal Harris:  normal 
 
Assessment:  
 
Hospital Problems  Date Reviewed: 6/18/2018 None Plan: Ms. Cyndi Mckee is consented for a diagnostic cystoscopy, and chemodenervation to the pelvic floor with 300U of botox. We will do a pudendal nerve block as well. Signed By: Silas Raya DO October 29, 2018

## 2018-10-30 ENCOUNTER — HOSPITAL ENCOUNTER (OUTPATIENT)
Age: 33
Setting detail: OUTPATIENT SURGERY
Discharge: HOME | End: 2018-10-30
Attending: OBSTETRICS & GYNECOLOGY | Admitting: OBSTETRICS & GYNECOLOGY
Payer: COMMERCIAL

## 2018-10-30 ENCOUNTER — ANESTHESIA (OUTPATIENT)
Dept: SURGERY | Age: 33
End: 2018-10-30
Payer: COMMERCIAL

## 2018-10-30 VITALS
WEIGHT: 136.31 LBS | OXYGEN SATURATION: 99 % | RESPIRATION RATE: 14 BRPM | HEART RATE: 60 BPM | DIASTOLIC BLOOD PRESSURE: 74 MMHG | SYSTOLIC BLOOD PRESSURE: 114 MMHG | TEMPERATURE: 97.6 F | BODY MASS INDEX: 20.13 KG/M2

## 2018-10-30 DIAGNOSIS — G89.18 POSTOPERATIVE PAIN: Primary | ICD-10-CM

## 2018-10-30 PROCEDURE — 77030020782 HC GWN BAIR PAWS FLX 3M -B: Performed by: ANESTHESIOLOGY

## 2018-10-30 PROCEDURE — 77030018832 HC SOL IRR H20 ICUM -A: Performed by: OBSTETRICS & GYNECOLOGY

## 2018-10-30 PROCEDURE — 74011250636 HC RX REV CODE- 250/636

## 2018-10-30 PROCEDURE — 74011250636 HC RX REV CODE- 250/636: Performed by: OBSTETRICS & GYNECOLOGY

## 2018-10-30 PROCEDURE — 77030005537 HC CATH URETH BARD -A: Performed by: OBSTETRICS & GYNECOLOGY

## 2018-10-30 PROCEDURE — 77030003666 HC NDL SPINAL BD -A: Performed by: OBSTETRICS & GYNECOLOGY

## 2018-10-30 PROCEDURE — 76060000032 HC ANESTHESIA 0.5 TO 1 HR: Performed by: OBSTETRICS & GYNECOLOGY

## 2018-10-30 PROCEDURE — 74011000250 HC RX REV CODE- 250: Performed by: ANESTHESIOLOGY

## 2018-10-30 PROCEDURE — 77030008467 HC STPLR SKN COVD -B: Performed by: OBSTETRICS & GYNECOLOGY

## 2018-10-30 PROCEDURE — 74011000250 HC RX REV CODE- 250: Performed by: OBSTETRICS & GYNECOLOGY

## 2018-10-30 PROCEDURE — 76210000006 HC OR PH I REC 0.5 TO 1 HR: Performed by: OBSTETRICS & GYNECOLOGY

## 2018-10-30 PROCEDURE — 77030018836 HC SOL IRR NACL ICUM -A: Performed by: OBSTETRICS & GYNECOLOGY

## 2018-10-30 PROCEDURE — 77030032490 HC SLV COMPR SCD KNE COVD -B: Performed by: OBSTETRICS & GYNECOLOGY

## 2018-10-30 PROCEDURE — 76210000020 HC REC RM PH II FIRST 0.5 HR: Performed by: OBSTETRICS & GYNECOLOGY

## 2018-10-30 PROCEDURE — 74011250636 HC RX REV CODE- 250/636: Performed by: ANESTHESIOLOGY

## 2018-10-30 PROCEDURE — 77030019927 HC TBNG IRR CYSTO BAXT -A: Performed by: OBSTETRICS & GYNECOLOGY

## 2018-10-30 PROCEDURE — 76010000160 HC OR TIME 0.5 TO 1 HR INTENSV-TIER 1: Performed by: OBSTETRICS & GYNECOLOGY

## 2018-10-30 RX ORDER — SODIUM CHLORIDE 9 MG/ML
INJECTION INTRAMUSCULAR; INTRAVENOUS; SUBCUTANEOUS AS NEEDED
Status: DISCONTINUED | OUTPATIENT
Start: 2018-10-30 | End: 2018-10-30 | Stop reason: HOSPADM

## 2018-10-30 RX ORDER — SODIUM CHLORIDE 0.9 % (FLUSH) 0.9 %
5-10 SYRINGE (ML) INJECTION AS NEEDED
Status: DISCONTINUED | OUTPATIENT
Start: 2018-10-30 | End: 2018-10-30 | Stop reason: HOSPADM

## 2018-10-30 RX ORDER — LIDOCAINE HYDROCHLORIDE 20 MG/ML
INJECTION, SOLUTION EPIDURAL; INFILTRATION; INTRACAUDAL; PERINEURAL AS NEEDED
Status: DISCONTINUED | OUTPATIENT
Start: 2018-10-30 | End: 2018-10-30 | Stop reason: HOSPADM

## 2018-10-30 RX ORDER — HYDROMORPHONE HYDROCHLORIDE 2 MG/ML
0.5 INJECTION, SOLUTION INTRAMUSCULAR; INTRAVENOUS; SUBCUTANEOUS
Status: DISCONTINUED | OUTPATIENT
Start: 2018-10-30 | End: 2018-10-30 | Stop reason: HOSPADM

## 2018-10-30 RX ORDER — PROPOFOL 10 MG/ML
INJECTION, EMULSION INTRAVENOUS AS NEEDED
Status: DISCONTINUED | OUTPATIENT
Start: 2018-10-30 | End: 2018-10-30 | Stop reason: HOSPADM

## 2018-10-30 RX ORDER — DEXAMETHASONE SODIUM PHOSPHATE 4 MG/ML
INJECTION, SOLUTION INTRA-ARTICULAR; INTRALESIONAL; INTRAMUSCULAR; INTRAVENOUS; SOFT TISSUE AS NEEDED
Status: DISCONTINUED | OUTPATIENT
Start: 2018-10-30 | End: 2018-10-30 | Stop reason: HOSPADM

## 2018-10-30 RX ORDER — LIDOCAINE HYDROCHLORIDE 10 MG/ML
0.1 INJECTION INFILTRATION; PERINEURAL AS NEEDED
Status: DISCONTINUED | OUTPATIENT
Start: 2018-10-30 | End: 2018-10-30 | Stop reason: HOSPADM

## 2018-10-30 RX ORDER — SODIUM CHLORIDE 0.9 % (FLUSH) 0.9 %
5-10 SYRINGE (ML) INJECTION EVERY 8 HOURS
Status: DISCONTINUED | OUTPATIENT
Start: 2018-10-30 | End: 2018-10-30 | Stop reason: HOSPADM

## 2018-10-30 RX ORDER — ONDANSETRON 2 MG/ML
INJECTION INTRAMUSCULAR; INTRAVENOUS AS NEEDED
Status: DISCONTINUED | OUTPATIENT
Start: 2018-10-30 | End: 2018-10-30 | Stop reason: HOSPADM

## 2018-10-30 RX ORDER — CEFAZOLIN SODIUM/WATER 2 G/20 ML
2 SYRINGE (ML) INTRAVENOUS ONCE
Status: COMPLETED | OUTPATIENT
Start: 2018-10-30 | End: 2018-10-30

## 2018-10-30 RX ORDER — MIDAZOLAM HYDROCHLORIDE 1 MG/ML
INJECTION, SOLUTION INTRAMUSCULAR; INTRAVENOUS AS NEEDED
Status: DISCONTINUED | OUTPATIENT
Start: 2018-10-30 | End: 2018-10-30 | Stop reason: HOSPADM

## 2018-10-30 RX ORDER — KETOROLAC TROMETHAMINE 30 MG/ML
30 INJECTION, SOLUTION INTRAMUSCULAR; INTRAVENOUS AS NEEDED
Status: DISCONTINUED | OUTPATIENT
Start: 2018-10-30 | End: 2018-10-30 | Stop reason: HOSPADM

## 2018-10-30 RX ORDER — SODIUM CHLORIDE, SODIUM LACTATE, POTASSIUM CHLORIDE, CALCIUM CHLORIDE 600; 310; 30; 20 MG/100ML; MG/100ML; MG/100ML; MG/100ML
125 INJECTION, SOLUTION INTRAVENOUS CONTINUOUS
Status: DISCONTINUED | OUTPATIENT
Start: 2018-10-30 | End: 2018-10-30 | Stop reason: HOSPADM

## 2018-10-30 RX ORDER — BUPIVACAINE HYDROCHLORIDE 7.5 MG/ML
INJECTION, SOLUTION EPIDURAL; RETROBULBAR AS NEEDED
Status: DISCONTINUED | OUTPATIENT
Start: 2018-10-30 | End: 2018-10-30 | Stop reason: HOSPADM

## 2018-10-30 RX ORDER — PROPOFOL 10 MG/ML
INJECTION, EMULSION INTRAVENOUS
Status: DISCONTINUED | OUTPATIENT
Start: 2018-10-30 | End: 2018-10-30 | Stop reason: HOSPADM

## 2018-10-30 RX ORDER — FENTANYL CITRATE 50 UG/ML
INJECTION, SOLUTION INTRAMUSCULAR; INTRAVENOUS AS NEEDED
Status: DISCONTINUED | OUTPATIENT
Start: 2018-10-30 | End: 2018-10-30 | Stop reason: HOSPADM

## 2018-10-30 RX ORDER — MIDAZOLAM HYDROCHLORIDE 1 MG/ML
2 INJECTION, SOLUTION INTRAMUSCULAR; INTRAVENOUS
Status: COMPLETED | OUTPATIENT
Start: 2018-10-30 | End: 2018-10-30

## 2018-10-30 RX ORDER — HYDROCODONE BITARTRATE AND ACETAMINOPHEN 5; 325 MG/1; MG/1
1 TABLET ORAL
Qty: 12 TAB | Refills: 0 | Status: SHIPPED | OUTPATIENT
Start: 2018-10-30 | End: 2018-11-02

## 2018-10-30 RX ORDER — NALOXONE HYDROCHLORIDE 0.4 MG/ML
0.1 INJECTION, SOLUTION INTRAMUSCULAR; INTRAVENOUS; SUBCUTANEOUS AS NEEDED
Status: DISCONTINUED | OUTPATIENT
Start: 2018-10-30 | End: 2018-10-30 | Stop reason: HOSPADM

## 2018-10-30 RX ADMIN — PROPOFOL 120 MCG/KG/MIN: 10 INJECTION, EMULSION INTRAVENOUS at 12:19

## 2018-10-30 RX ADMIN — PROPOFOL 30 MG: 10 INJECTION, EMULSION INTRAVENOUS at 12:23

## 2018-10-30 RX ADMIN — HYDROMORPHONE HYDROCHLORIDE 0.5 MG: 2 INJECTION, SOLUTION INTRAMUSCULAR; INTRAVENOUS; SUBCUTANEOUS at 13:03

## 2018-10-30 RX ADMIN — MIDAZOLAM HYDROCHLORIDE 2 MG: 1 INJECTION, SOLUTION INTRAMUSCULAR; INTRAVENOUS at 12:19

## 2018-10-30 RX ADMIN — FENTANYL CITRATE 50 MCG: 50 INJECTION, SOLUTION INTRAMUSCULAR; INTRAVENOUS at 12:24

## 2018-10-30 RX ADMIN — MIDAZOLAM 2 MG: 1 INJECTION INTRAMUSCULAR; INTRAVENOUS at 11:50

## 2018-10-30 RX ADMIN — PROPOFOL 20 MG: 10 INJECTION, EMULSION INTRAVENOUS at 12:27

## 2018-10-30 RX ADMIN — LIDOCAINE HYDROCHLORIDE 0.1 ML: 10 INJECTION, SOLUTION INFILTRATION; PERINEURAL at 11:46

## 2018-10-30 RX ADMIN — LIDOCAINE HYDROCHLORIDE 40 MG: 20 INJECTION, SOLUTION EPIDURAL; INFILTRATION; INTRACAUDAL; PERINEURAL at 12:21

## 2018-10-30 RX ADMIN — FENTANYL CITRATE 50 MCG: 50 INJECTION, SOLUTION INTRAMUSCULAR; INTRAVENOUS at 12:19

## 2018-10-30 RX ADMIN — Medication 2 G: at 12:15

## 2018-10-30 RX ADMIN — SODIUM CHLORIDE 6.25 MG: 9 INJECTION INTRAMUSCULAR; INTRAVENOUS; SUBCUTANEOUS at 13:10

## 2018-10-30 RX ADMIN — SODIUM CHLORIDE, SODIUM LACTATE, POTASSIUM CHLORIDE, AND CALCIUM CHLORIDE 125 ML/HR: 600; 310; 30; 20 INJECTION, SOLUTION INTRAVENOUS at 11:37

## 2018-10-30 RX ADMIN — DEXAMETHASONE SODIUM PHOSPHATE 5 MG: 4 INJECTION, SOLUTION INTRA-ARTICULAR; INTRALESIONAL; INTRAMUSCULAR; INTRAVENOUS; SOFT TISSUE at 12:47

## 2018-10-30 RX ADMIN — PROPOFOL 20 MG: 10 INJECTION, EMULSION INTRAVENOUS at 12:25

## 2018-10-30 RX ADMIN — ONDANSETRON 4 MG: 2 INJECTION INTRAMUSCULAR; INTRAVENOUS at 12:47

## 2018-10-30 NOTE — OP NOTES
PROCEDURES PERFORMED:   1. Cystoscopy (92792)  2. Chemodenervation (92904) to the pelvic floor with onabotulinumtoxina 300U (). 3. Pudendal Nerve Block (22262)    PREOPERATIVE DIAGNOSIS: Interstitial cystitis (N30.10), high tone pelvic floor dysfunction (B68.111), pudendal neuralgia (M79.2)    POSTOPERATIVE DIAGNOSIS: Interstitial cystitis, high tone pelvic floor dysfunction, pudendal neuralgia    FINDINGS: Normal appearing bladder, normal appearing ureteral orifices. Efflux seen from Ureteral orifices bilaterally. High tone pelvic floor muscles, specifically the coccygeus, iliococcygeus, pubococcygeus and puborectalis bilaterally. COMPLICATIONS: No complications. INDICATIONS FOR PROCEDURE: The patient is a 35year-old under my care for treatment of her interstitial cystitis, Pelvic Floor Dysfunction, and Muscle Spasticity, and pudendal neuralgia. . This is a chronic condition which causes her to suffer from extreme muscle spasms which are very painful. She has tried several treatments including physical therapy, and valium suppositories. She has been unable to adequately control any symptoms. PROCEDURE IN DETAIL: The patient was brought to the operating room. She was positioned in the dorsal lithotomy position in [facility] in a neurologically safe and comfortable position. She was then sedated. She was prepped and draped in a sterile fashion. The bladder was drained completely. I then performed comprehensive cystourethroscopy with findings as described above. Attention was then placed on the patients pelvic floor. A pudendal nerve block was administered bilaterally using 5cc of 0.5% marcaine into each pudendal nerve for a total of 10cc. 20cc of marcaine were instilled into the bladder as a local anesthetic. Chemodenervation was then performed. 300U of botox were injected into the pelvic floor.  2cc into the coccygeus, 1cc into iliococcygeus, 1cc into pubococcygeus and 1cc into puborectalis bilaterally for a total of 10cc. The bladder was then drained. At this time, I terminated the procedure. Throughout the procedure, all sponge, laps, instruments and needle counts were correct.

## 2018-10-30 NOTE — DISCHARGE SUMMARY
Patient reported to the hospital for surgery today. She had her procedure and tolerated it well. She is stable for discharge when she meets milestones.

## 2018-10-30 NOTE — DISCHARGE INSTRUCTIONS
Cystoscopy: What to Expect at [address] [facility]    A cystoscopy is a procedure that lets a doctor look inside of the bladder and the urethra. The urethra is the tube that carries urine from the bladder to outside the body. The doctor uses a thin, lighted tool called a cystoscope. Your bladder is filled with fluid. This stretches the bladder so that your doctor can look closely at the inside of your bladder. After the cystoscopy, your urethra may be sore at first, and it may burn when you urinate for the first few days after the procedure. You may feel the need to urinate more often, and your urine may be pink. These symptoms should get better in 1 or 2 days. You will probably be able to go back to most of your usual activities in 1 or 2 days. This care sheet gives you a general idea about how long it will take for you to recover. But each person recovers at a different pace. Follow the steps below to get better as quickly as possible. How can you care for yourself at home? Activity    · Rest when you feel tired. Getting enough sleep will help you recover.     · Try to walk each day. Start by walking a little more than you did the day before. Bit by bit, increase the amount you walk. Walking boosts blood flow and helps prevent pneumonia and constipation.     · Avoid strenuous activities, such as bicycle riding, jogging, weight lifting, or aerobic exercise, until your doctor says it is okay.     · Ask your doctor when you can drive again.     · Most people are able to return to work within 1 or 2 days after the procedure.     · You may shower and take baths as usual.     · Ask your doctor when it is okay for you to have sex. Diet    · You can eat your normal diet. If your stomach is upset, try bland, low-fat foods like plain rice, broiled chicken, toast, and yogurt.     · Drink plenty of fluids (unless your doctor tells you not to). Medicines    · Take pain medicines exactly as directed.   ? If the doctor gave you a prescription medicine for pain, take it as prescribed. ? If you are not taking a prescription pain medicine, ask your doctor if you can take an over-the-counter medicine.     · If you think your pain medicine is making you sick to your stomach:  ? Take your medicine after meals (unless your doctor has told you not to). ? Ask your doctor for a different pain medicine.     · If your doctor prescribed antibiotics, take them as directed. Do not stop taking them just because you feel better. You need to take the full course of antibiotics. Follow-up care is a key part of your treatment and safety. Be sure to make and go to all appointments, and call your doctor if you are having problems. It's also a good idea to know your test results and keep a list of the medicines you take. When should you call for help? Call 911 anytime you think you may need emergency care. For example, call if:    · You passed out (lost consciousness).     · You have severe trouble breathing.     · You have sudden chest pain and shortness of breath, or you cough up blood.     · You have severe belly pain.    Call your doctor now or seek immediate medical care if:    · You are sick to your stomach or cannot keep fluids down.     · Your urine is still red or you see blood clots after you have urinated several times.     · You have trouble passing urine or stool, especially if you have pain or swelling in your lower belly.     · You have signs of a blood clot, such as:  ? Pain in your calf, back of the knee, thigh, or groin. ? Redness and swelling in your leg or groin.     · You develop a fever or severe chills.     · You have pain in your back just below your rib cage. This is called flank pain.    Watch closely for changes in your health, and be sure to contact your doctor if:    · You have pain or burning when you urinate.  A burning feeling is normal for a day or two after the test, but call if it does not get better.     · You have a frequent urge to urinate but can pass only small amounts of urine.     · Your urine is pink, red, or cloudy, or smells bad. It is normal for the urine to have a pinkish color for a few days after the test, but call if it does not get better. Where can you learn more? Go to http://angeline-carlitos.info/. Enter F602 in the search box to learn more about \"Cystoscopy: What to Expect at Home. \"  Current as of: March 21, 2018  Content Version: 11.8  © 1789-1802 [employer]. Care instructions adapted under license by [employer] (which disclaims liability or warranty for this information). If you have questions about a medical condition or this instruction, always ask your healthcare professional. [address] any warranty or liability for your use of this information.

## 2018-10-30 NOTE — ANESTHESIA PREPROCEDURE EVALUATION
Anesthetic History PONV Review of Systems / Medical History Patient summary reviewed and pertinent labs reviewed Pulmonary Within defined limits Neuro/Psych Psychiatric history Comments: Chronic pelvic pain Cardiovascular Within defined limits Exercise tolerance: >4 METS 
  
GI/Hepatic/Renal 
Within defined limits Endo/Other Within defined limits Other Findings Physical Exam 
 
Airway Mallampati: I 
TM Distance: 4 - 6 cm Neck ROM: normal range of motion Mouth opening: Normal 
 
 Cardiovascular Rhythm: regular Rate: normal 
 
 
 
 Dental 
No notable dental hx Pulmonary Breath sounds clear to auscultation Abdominal 
 
 
 
 Other Findings Anesthetic Plan ASA: 1 Anesthesia type: total IV anesthesia Anesthetic plan and risks discussed with: Patient and Spouse

## 2018-10-30 NOTE — ANESTHESIA POSTPROCEDURE EVALUATION
Procedure(s): 
CYSTOSCOPY PELVIC FLOOR INJECTION OF BOTOX, chemodenervation  and pudendal nerve block. Anesthesia Post Evaluation Multimodal analgesia: multimodal analgesia used between 6 hours prior to anesthesia start to PACU discharge Patient location during evaluation: PACU Patient participation: complete - patient participated Level of consciousness: awake Pain management: adequate Airway patency: patent Anesthetic complications: no 
Cardiovascular status: acceptable and hemodynamically stable Respiratory status: acceptable Hydration status: acceptable Comments: Acceptable for discharge from PACU. Visit Vitals /74 (BP 1 Location: Right arm, BP Patient Position: At rest) Pulse 60 Temp 36.4 °C (97.6 °F) Resp 14 Wt 61.8 kg (136 lb 5 oz) SpO2 99% BMI 20.13 kg/m²

## 2018-11-02 ENCOUNTER — HOSPITAL ENCOUNTER (OUTPATIENT)
Dept: PHYSICAL THERAPY | Age: 33
Discharge: HOME | End: 2018-11-02
Payer: COMMERCIAL

## 2018-11-30 ENCOUNTER — HOSPITAL ENCOUNTER (OUTPATIENT)
Dept: PHYSICAL THERAPY | Age: 33
Discharge: HOME | End: 2018-11-30
Payer: COMMERCIAL

## 2018-11-30 DIAGNOSIS — M62.89 PELVIC FLOOR DYSFUNCTION IN FEMALE: ICD-10-CM

## 2018-11-30 DIAGNOSIS — R10.2 PELVIC PAIN: ICD-10-CM

## 2018-11-30 PROCEDURE — 97163 PT EVAL HIGH COMPLEX 45 MIN: CPT

## 2018-11-30 NOTE — THERAPY EVALUATION
[place] [facility]  : 1985  Primary: Lambert Jeans [facility]  Secondary:  [address] at [place] [id number] [facility], [address] [place] [address],[address], [address] [facility]  [phone]   [phone]          OUTPATIENT PHYSICAL THERAPY:Initial Assessment 2018    ICD-10: Treatment Diagnosis: Myalgia (M79.1)Fibromyalgia (M79.7)Other lack of coordination (R27.8)   Precautions/Allergies:   Codeine; Dermagraft [cult skin subst, human-bovine]; and Percocet [oxycodone-acetaminophen]   MD Orders: Eval and treat MEDICAL/REFERRING DIAGNOSIS:  Pelvic floor dysfunction in female [M62.89]  Pelvic pain [R10.2]   DATE OF ONSET: Chronic  REFERRING PHYSICIAN: Emmie Serna, *  RETURN PHYSICIAN APPOINTMENT: TBD     INITIAL ASSESSMENT:  Ms. Helen Cummings returns to therapy after having surgery with Dr. Carlitos Meeks and botox with Dr. Donavan Regan. She is now 3 weeks post op her endometrial surgery. She had previous Botox prior to surgery in the pelvic floor mm. . Her surgical and therapy history is very long and complex. She has had multiple surgery's and multiple encoutners with pelvic floor rehabilitation. Today in the clinic, I was only able to palpate at the introitus and the first layer of pelvic floor mm. Pt reported significant pain with palpation at the vaginal opening, labia majora, and abdomen. Therefore, I suggested we wait two weeks until her next visit. She was agreeable with POC. Pt is an excellent candidate for pelvic floor therapy due to her limitations with sitting, standing, penetrative intercourse, PMH, and significant level of pain. Pt would benefit from skilled PT. PROBLEM LIST (Impacting functional limitations):  1. Decreased Strength  2. Decreased ADL/Functional Activities  3. Increased Pain  4. Decreased Flexibility/Joint Mobility  5. Decreased [place] with Home Exercise Program INTERVENTIONS PLANNED:  1. Neuromuscular re-education  2. Biofeedback as needed  3.  HEP  4. Bladder retraining  5. Bladder education  6. Electrical Stimulation  7. Home Exercise Program (HEP)  8. Manual Therapy  9. Range of Motion (ROM)  10. Therapeutic Exercise/Strengthening  11. Ultrasound (US)  12. low level light therapy   TREATMENT PLAN:  Effective Dates: 11/30/2018 TO 2/28/2019 (90 days). Frequency/Duration: 2 times a week for 90 Days  GOALS: (Goals have been discussed and agreed upon with patient.)  Short-Term Functional Goals: Time Frame: in 4 weeks  1. Patient will demonstrate independence with home exercise program.  Discharge Goals: Time Frame: TBD  Rehabilitation Potential For Stated Goals: Fair  Regarding Dayana Rojas's therapy, I certify that the treatment plan above will be carried out by a therapist or under their direction. Thank you for this referral,  Amber Giraldo, PT     Referring Physician Signature: Niles Hinton, *              Date                    The information in this section was collected on 11/30/2018  (except where otherwise noted). HISTORY:   History of Present Injury/Illness (Reason for Referral):  Dx at 13 with endometriosis. That was when she had her first laprascropy. Has had 4 laparoscopy. First vaginal delivery 2009. Second child was born 2010. Had a vaginal delivery. Did do an ablation  which lasted for about one year. 2014 had a hysterectomy. Got sick after surgery. 8 weeks later had a vaginal dehiscence. Was septic then. Did not rebuild the cuff. The endo starts to attach the vaginal cuff. Started seeing [facility] for pelvic floor therapy. Caryl Nunez is having difficulty with palpation. Nov 2016 had a excision surgery for endometriosis. They build up the cuff. They tacked the left ovary. Eventually took out the left ovary in 2017 and found endo in the pelvis. In 2017, was dx with fibro. Lesly Hart tried to ablation of the ilioinguinal nerve. Had necrosis around the hip bone.  Recently had Botox with . Dr Denia John had a cytoscopy and the bladder was healthy. Went to see Dr. Shania Verduzco to  redo surgery in the pelvis for possible endo. Deadend nerve and it feels better. Vaginal valium in the past was helpful. Needling does help pain, soft tissue, and deep tissues. Unable to stand for any period of time. Does better with movement. Had tailbone pain for a while and does use cushions. Feels burring at the [facility]. Past Medical History/Comorbidities:   Ms. Jeff Roberts  has a past medical history of Anxiety, Bladder pain, Chronic pain, Endometriosis, Gluten intolerance, High-tone pelvic floor dysfunction, IBS (irritable bowel syndrome), Migraine, Nausea & vomiting, Ovarian cyst, PUD (peptic ulcer disease), Unspecified adverse effect of anesthesia, UTI (urinary tract infection), and Vaginal cuff dehiscence. Ms. Jeff Roberts  has a past surgical history that includes pr endometrial cryoablation; hx other surgical; hx bilateral salpingectomy; hx other surgical (11/2016); hx total laparoscopic hysterectomy; hx breast augmentation (2003); hx gyn (2000, 2004); hx dilation and curettage; hx pelvic laparoscopy (11/03/2016); hx oophorectomy (Left); hx other surgical (10/30/2018); hx other surgical (11/06/2018); CYSTOSCOPY PELVIC FLOOR INJECTION OF BOTOX, chemodenervation  and pudendal nerve block (N/A, 10/30/2018); BILATERAL LUMBAR SYMPATHETIC BLOCK (Bilateral, 12/21/2017); IR ANESTHESIA// UTERINE FIBROID UTILIZATION (N/A, 2/8/2016); HYSTERECTOMY TOTAL LAPAROSCOPIC (N/A, 5/30/2014); ENDOMETRIAL ABLATION (N/A, 2/16/2012); and DILATATION AND CURETTAGE HYSTEROSCOPY (N/A, 2/16/2012). Social History/Living Environment:    Has 3 children, works during the day at [employer]  Prior Level of Function/Work/Activity:  Has been having pain for many years. Previous Treatment Approaches:          Pelvic floor therapy.       Ambulatory/Rehab Services H2 Model Falls Risk Assessment    Risk Factors:       No Risk Factors Identified Ability to Rise from Chair:       (0)  Ability to rise in a single movement    Falls Prevention Plan:       No modifications necessary   Total: (5 or greater = High Risk): 0    ©2010 [facility]. All Rights Reserved. [facility] Hospital[facility] States Patent #[id number]. Federal Law prohibits the replication, distribution or use without written permission from [facility] [employer]     Current Medications:    Current Outpatient Medications:     pregabalin (LYRICA) 75 mg capsule, Take 75 mg by mouth two (2) times a day., Disp: , Rfl:     sertraline (ZOLOFT) 100 mg tablet, Take 150 mg by mouth nightly., Disp: , Rfl:     diazePAM (VALIUM) 10 mg tablet, 10 mg. Vaginal suppository--uses every other night, Disp: , Rfl:     omega 3-DHA-EPA-fish oil 1,000 mg (120 mg-180 mg) capsule, Take 1 Cap by mouth., Disp: , Rfl:     melatonin 5 mg cap capsule, Take 10 mg by mouth nightly., Disp: , Rfl:     estradiol (ESTRACE) 1 mg tablet, TK 1 T PO QHS, Disp: , Rfl: 11    methen-sod phos-meth blue-hyos (UROGESIC-BLUE) 81.6-40.8-0.12 mg tab, May take 1-3 tabs per day as needed for bladder pain, Disp: 60 Tab, Rfl: 2    methocarbamol (ROBAXIN) 500 mg tablet, Take 500 mg by mouth two (2) times a day., Disp: , Rfl:     UBIDECARENONE/VITAMIN E MIXED (COQ10  PO), Take  by mouth nightly., Disp: , Rfl:     VIT B2/NIACIN/B6/B12/DEXPANTH (B COMPLEX SL), by SubLINGual route daily. , Disp: , Rfl:     traMADol (ULTRAM) 50 mg tablet, Take 1-2 Tabs by mouth every six (6) hours as needed for Pain. Max Daily Amount: 400 mg., Disp: 60 Tab, Rfl: 0    clonazePAM (KLONOPIN) 0.5 mg tablet, Take  by mouth nightly as needed. , Disp: , Rfl:     promethazine (PHENERGAN) 25 mg tablet, Take 25 mg by mouth every six (6) hours as needed for Nausea., Disp: , Rfl:     ibuprofen (MOTRIN) 800 mg tablet, Take 1 Tab by mouth every six (6) hours as needed. (Patient taking differently: Take 800 mg by mouth every six (6) hours as needed.  Last dose 12/17/17), Disp: 30 Tab, Rfl: 0    magnesium oxide (MAG-OX) 400 mg tablet, Take 400 mg by mouth daily. , Disp: , Rfl:     loratadine (CLARITIN) 10 mg tablet, Take 10 mg by mouth every evening., Disp: , Rfl:     LACTOBACILLUS ACIDOPHILUS (PROBIOTIC PO), Take  by mouth., Disp: , Rfl:     multivitamin (ONE A DAY) tablet, Take 1 Tab by mouth daily. , Disp: , Rfl:    Date Last Reviewed:  11/30/2018   Voiding Patterns:  Patient voids every 4 hours during the day and 1 times during the night. Patient reports that she does not empties bladder. Does not leak. Fluid Intake:  Patient drinks 1 gallon of water per day. She consumes 1 cups of caffeinated beverages per day. Patient not limit fluid intake to control bladder. Bowel Habits:  Patient demonstrates constipation. Probiotic, citricel, linzes, mirilax, smooth move. Bowel movements are soft  Personal / Social History:  · Sexually active? Yes, but very painful       Number of Personal Factors/Comorbidities that affect the Plan of Care: 3+: HIGH COMPLEXITY   EXAMINATION:   External Observation:     · Anal [place]: present  · Skin Integrity: WNL  · Q-tip Test: Painful  Palpation:   Pt unable to tolerate any palpation at the 1st layer of the vaginal canal.  Reported significant pain at the introitus. Pt unable to tolerate light touch to abdomen   Will await two more weeks prior to next visit. Body Structures Involved:  1. Muscles Body Functions Affected:  1. Genitourinary  2. Neuromusculoskeletal Activities and Participation Affected:  1. Mobility  2. Self Care  3. Interpersonal Interactions and Relationships   Number of elements that affect the Plan of Care: 4+: HIGH COMPLEXITY   CLINICAL PRESENTATION:   Presentation: Evolving clinical presentation with unstable and unpredictable characteristics: HIGH COMPLEXITY   CLINICAL DECISION MAKING:   Outcome Measure: Tool Used: Pelvic Floor Disability Index (PFDI-20)  Score:  Initial: 56 Most Recent: X (Date: -- )   Interpretation of Score:   This survey asks questions concerning certain  bowel, bladder, or pelvic symptoms and how much this symptoms interfere with daily activiites. Each section is scored on a 0-4 scale, 4 representing the greatest disability. The scores of each section are added together for a total score out of 70. Score 0 1-13 14-27 28-41 42-55 56-69 70   Modifier CH CI CJ CK CL CM CN     Medical Necessity:   · Patient is expected to demonstrate progress in coordination to decrease assistance required with pelvic floor drops. Reason for Services/Other Comments:  · Patient continues to require skilled intervention due to pelvic pain. Use of outcome tool(s) and clinical judgement create a POC that gives a: Difficult prediction of patient's progress: HIGH COMPLEXITY   TREATMENT:   (In addition to Assessment/Re-Assessment sessions the following treatments were rendered)  Pre-treatment Symptoms/Complaints:  Pain   Pain: Initial:   Pain Intensity 1: 6  Pain Location 1: Pelvis  Post Session:  6/10     Assessment only today, no treatment provided. (Used abbreviations: MET - muscle energy technique; SCS- Strain counter strain; CTM- Connective tissue mobilizations; CR- Contract/relax; SP- Sustained pressure, TrP- Trigger point release, IASTM- Instrument assisted soft tissue mobilizations, TDN- Trigger point dry needling). Exercises:  Patient instructed in pelvic floor exercises listed below:  NA    [employer] Portal    The following educational topics were reviewed with patient:   Treatment/Session Assessment:    · Response to Treatment:   Pt reported good understanding of plan of care as well as exercises. All questions were answered and pt. Invited to call with any further questions or issues. · Compliance with Program/Exercises: Will assess as treatment progresses. · Recommendations/Intent for next treatment session: \"Next visit will focus on advancements to more challenging activities\".   Total Treatment Duration:  PT Patient Time In/Time Out  Time In: 1100  Time Out: [address], PT    Future Appointments   Date Time Provider Jose Cruz Maier   12/6/2018  7:00 PM Keegan Balderas [facility] SFE   12/12/2018 11:00 AM Keegan Balderas PT [facility] SFE   12/14/2018  5:45 PM SFE [facility] ROOM 2 [facility] SFE   12/20/2018  9:00 AM MALIA Cardoza SFE   1/4/2019 11:00 AM Keegan Balderas PT [facility] SFE   1/11/2019 11:00 AM Keegan Balderas PT [facility] SFE

## 2018-12-12 ENCOUNTER — HOSPITAL ENCOUNTER (OUTPATIENT)
Dept: PHYSICAL THERAPY | Age: 33
Discharge: HOME | End: 2018-12-12
Payer: COMMERCIAL

## 2018-12-12 PROCEDURE — 97140 MANUAL THERAPY 1/> REGIONS: CPT

## 2018-12-12 PROCEDURE — 97110 THERAPEUTIC EXERCISES: CPT

## 2018-12-12 NOTE — PROGRESS NOTES
Alexus Rojas  : 1985  Primary: Jb Mckenzie  Secondary:  [address]  at [place] [address] [facility], [address] [place] [address],[address]  [phone]   [phone]          OUTPATIENT PHYSICAL THERAPY:Daily Note 2018    ICD-10: Treatment Diagnosis: Myalgia (M79.1)Fibromyalgia (M79.7)Other lack of coordination (R27.8)   Precautions/Allergies:   Codeine; Dermagraft [cult skin subst, human-bovine]; and Percocet [oxycodone-acetaminophen]   MD Orders: Eval and treat MEDICAL/REFERRING DIAGNOSIS:  Pelvic and perineal pain [R10.2]   DATE OF ONSET: Chronic  REFERRING PHYSICIAN: Zohreh Crenshaw, *  RETURN PHYSICIAN APPOINTMENT: TBD     INITIAL ASSESSMENT:  Ms. Jaylin Lezama returns to therapy after having surgery with Dr. Aida Correa and botox with Dr. Kelvin Cline. She is now 3 weeks post op her endometrial surgery. She had previous Botox prior to surgery in the pelvic floor mm. . Her surgical and therapy history is very long and complex. She has had multiple surgery's and multiple encoutners with pelvic floor rehabilitation. Today in the clinic, I was only able to palpate at the introitus and the first layer of pelvic floor mm. Pt reported significant pain with palpation at the vaginal opening, labia majora, and abdomen. Therefore, I suggested we wait two weeks until her next visit. She was agreeable with POC. Pt is an excellent candidate for pelvic floor therapy due to her limitations with sitting, standing, penetrative intercourse, PMH, and significant level of pain. Pt would benefit from skilled PT. PROBLEM LIST (Impacting functional limitations):  1. Decreased Strength  2. Decreased ADL/Functional Activities  3. Increased Pain  4. Decreased Flexibility/Joint Mobility  5. Decreased [place] with Home Exercise Program INTERVENTIONS PLANNED:  1. Neuromuscular re-education  2. Biofeedback as needed  3. HEP  4. Bladder retraining  5. Bladder education  6.  Electrical Stimulation  7. Home Exercise Program (HEP)  8. Manual Therapy  9. Range of Motion (ROM)  10. Therapeutic Exercise/Strengthening  11. Ultrasound (US)  12. low level light therapy   TREATMENT PLAN:  Effective Dates: 11/30/2018 TO 2/28/2019 (90 days). Frequency/Duration: 2 times a week for 90 Days  GOALS: (Goals have been discussed and agreed upon with patient.)  Short-Term Functional Goals: Time Frame: in 4 weeks  1. Patient will demonstrate independence with home exercise program.  Discharge Goals: Time Frame: TBD  Rehabilitation Potential For Stated Goals: Fair  Regarding Dayana Rojas's therapy, I certify that the treatment plan above will be carried out by a therapist or under their direction. Thank you for this referral,  Maxwell Ramirez, PT     Referring Physician Signature: Linda Smith, *              Date                    The information in this section was collected on 12/12/2018  (except where otherwise noted). HISTORY:   History of Present Injury/Illness (Reason for Referral):  Dx at 13 with endometriosis. That was when she had her first laprascropy. Has had 4 laparoscopy. First vaginal delivery 2009. Second child was born 2010. Had a vaginal delivery. Did do an ablation  which lasted for about one year. 2014 had a hysterectomy. Got sick after surgery. 8 weeks later had a vaginal dehiscence. Was septic then. Did not rebuild the cuff. The endo starts to attach the vaginal cuff. Started seeing Bradford Willard for pelvic floor therapy. Eda Nissen is having difficulty with palpation. Nov 2016 had a excision surgery for endometriosis. They build up the cuff. They tacked the left ovary. Eventually took out the left ovary in 2017 and found endo in the pelvis. In 2017, was dx with fibro. Sukhi Smith tried to ablation of the ilioinguinal nerve. Had necrosis around the hip bone. Recently had Botox with . Dr Juaquin Castro had a cytoscopy and the bladder was healthy.   Went to see Dr. Shania Verduzco to redo surgery in the pelvis for possible endo. Deadend nerve and it feels better. Vaginal valium in the past was helpful. Needling does help pain, soft tissue, and deep tissues. Unable to stand for any period of time. Does better with movement. Had tailbone pain for a while and does use cushions. Feels burring at the [facility]. Past Medical History/Comorbidities:   Ms. Pierre Hastings  has a past medical history of Anxiety, Bladder pain, Chronic pain, Endometriosis, Gluten intolerance, High-tone pelvic floor dysfunction, IBS (irritable bowel syndrome), Migraine, Nausea & vomiting, Ovarian cyst, PUD (peptic ulcer disease), Unspecified adverse effect of anesthesia, UTI (urinary tract infection), and Vaginal cuff dehiscence. Ms. Pierre Hastings  has a past surgical history that includes pr endometrial cryoablation; hx other surgical; hx bilateral salpingectomy; hx other surgical (11/2016); hx total laparoscopic hysterectomy; hx breast augmentation (2003); hx gyn (2000, 2004); hx dilation and curettage; hx pelvic laparoscopy (11/03/2016); hx oophorectomy (Left); hx other surgical (10/30/2018); hx other surgical (11/06/2018); CYSTOSCOPY PELVIC FLOOR INJECTION OF BOTOX, chemodenervation  and pudendal nerve block (N/A, 10/30/2018); BILATERAL LUMBAR SYMPATHETIC BLOCK (Bilateral, 12/21/2017); IR ANESTHESIA// UTERINE FIBROID UTILIZATION (N/A, 2/8/2016); HYSTERECTOMY TOTAL LAPAROSCOPIC (N/A, 5/30/2014); ENDOMETRIAL ABLATION (N/A, 2/16/2012); and DILATATION AND CURETTAGE HYSTEROSCOPY (N/A, 2/16/2012). Social History/Living Environment:    Has 3 children, works during the day at [employer]  Prior Level of Function/Work/Activity:  Has been having pain for many years. Previous Treatment Approaches:          Pelvic floor therapy.       Ambulatory/Rehab Services H2 Model Falls Risk Assessment    Risk Factors:       No Risk Factors Identified Ability to Rise from Chair:       (0)  Ability to rise in a single movement    Falls Prevention Plan: No modifications necessary   Total: (5 or greater = High Risk): 0    ©2010 [facility] [place] [address] [place] [address] States Patent #[id number]. Federal Law prohibits the replication, distribution or use without written permission from [facility] [employer]     Current Medications:    Current Outpatient Medications:     pregabalin (LYRICA) 75 mg capsule, Take 75 mg by mouth two (2) times a day., Disp: , Rfl:     sertraline (ZOLOFT) 100 mg tablet, Take 150 mg by mouth nightly., Disp: , Rfl:     diazePAM (VALIUM) 10 mg tablet, 10 mg. Vaginal suppository--uses every other night, Disp: , Rfl:     omega 3-DHA-EPA-fish oil 1,000 mg (120 mg-180 mg) capsule, Take 1 Cap by mouth., Disp: , Rfl:     melatonin 5 mg cap capsule, Take 10 mg by mouth nightly., Disp: , Rfl:     estradiol (ESTRACE) 1 mg tablet, TK 1 T PO QHS, Disp: , Rfl: 11    methen-sod phos-meth blue-hyos (UROGESIC-BLUE) 81.6-40.8-0.12 mg tab, May take 1-3 tabs per day as needed for bladder pain, Disp: 60 Tab, Rfl: 2    methocarbamol (ROBAXIN) 500 mg tablet, Take 500 mg by mouth two (2) times a day., Disp: , Rfl:     UBIDECARENONE/VITAMIN E MIXED (COQ10  PO), Take  by mouth nightly., Disp: , Rfl:     VIT B2/NIACIN/B6/B12/DEXPANTH (B COMPLEX SL), by SubLINGual route daily. , Disp: , Rfl:     traMADol (ULTRAM) 50 mg tablet, Take 1-2 Tabs by mouth every six (6) hours as needed for Pain. Max Daily Amount: 400 mg., Disp: 60 Tab, Rfl: 0    clonazePAM (KLONOPIN) 0.5 mg tablet, Take  by mouth nightly as needed. , Disp: , Rfl:     promethazine (PHENERGAN) 25 mg tablet, Take 25 mg by mouth every six (6) hours as needed for Nausea., Disp: , Rfl:     ibuprofen (MOTRIN) 800 mg tablet, Take 1 Tab by mouth every six (6) hours as needed. (Patient taking differently: Take 800 mg by mouth every six (6) hours as needed. Last dose 12/17/17), Disp: 30 Tab, Rfl: 0    magnesium oxide (MAG-OX) 400 mg tablet, Take 400 mg by mouth daily. , Disp: , Rfl:    loratadine (CLARITIN) 10 mg tablet, Take 10 mg by mouth every evening., Disp: , Rfl:     LACTOBACILLUS ACIDOPHILUS (PROBIOTIC PO), Take  by mouth., Disp: , Rfl:     multivitamin (ONE A DAY) tablet, Take 1 Tab by mouth daily. , Disp: , Rfl:    Date Last Reviewed:  12/12/2018   Voiding Patterns:  Patient voids every 4 hours during the day and 1 times during the night. Patient reports that she does not empties bladder. Does not leak. Fluid Intake:  Patient drinks 1 gallon of water per day. She consumes 1 cups of caffeinated beverages per day. Patient not limit fluid intake to control bladder. Bowel Habits:  Patient demonstrates constipation. Probiotic, citricel, linzes, mirilax, smooth move. Bowel movements are soft  Personal / Social History:  · Sexually active? Yes, but very painful       Number of Personal Factors/Comorbidities that affect the Plan of Care: 3+: HIGH COMPLEXITY   EXAMINATION:   External Observation:     · Anal [place]: present  · Skin Integrity: WNL  · Q-tip Test: Painful  Palpation:   Pt unable to tolerate any palpation at the 1st layer of the vaginal canal.  Reported significant pain at the introitus. Pt unable to tolerate light touch to abdomen   Will await two more weeks prior to next visit. Body Structures Involved:  1. Muscles Body Functions Affected:  1. Genitourinary  2. Neuromusculoskeletal Activities and Participation Affected:  1. Mobility  2. Self Care  3. Interpersonal Interactions and Relationships   Number of elements that affect the Plan of Care: 4+: HIGH COMPLEXITY   CLINICAL PRESENTATION:   Presentation: Evolving clinical presentation with unstable and unpredictable characteristics: HIGH COMPLEXITY   CLINICAL DECISION MAKING:   Outcome Measure: Tool Used: Pelvic Floor Disability Index (PFDI-20)  Score:  Initial: 56 Most Recent: X (Date: -- )   Interpretation of Score:   This survey asks questions concerning certain  bowel, bladder, or pelvic symptoms and how much this symptoms interfere with daily activiites. Each section is scored on a 0-4 scale, 4 representing the greatest disability. The scores of each section are added together for a total score out of 70. Score 0 1-13 14-27 28-41 42-55 56-69 70   Modifier CH CI CJ CK CL CM CN     Medical Necessity:   · Patient is expected to demonstrate progress in coordination to decrease assistance required with pelvic floor drops. Reason for Services/Other Comments:  · Patient continues to require skilled intervention due to pelvic pain. Use of outcome tool(s) and clinical judgement create a POC that gives a: Difficult prediction of patient's progress: HIGH COMPLEXITY   TREATMENT:   (In addition to Assessment/Re-Assessment sessions the following treatments were rendered)  Pre-treatment Symptoms/Complaints:  Pain mostly at the vulva. Not as bad as two weeks about. Pain: Initial:   Pain Intensity 1: 4  Pain Location 1: Pelvis  Post Session:  4/10     THERAPEUTIC EXERCISE: (15 minutes):  Exercises per grid below to improve coordination. Required minimal visual and tactile cues to promote proper body breathing techniques. Progressed complexity of movement as indicated. MANUAL THERAPY: (25 minutes): Soft tissue mobilization was utilized and necessary because of the patient's painful spasm and restricted motion of soft tissue. (Used abbreviations: MET - muscle energy technique; SCS- Strain counter strain; CTM- Connective tissue mobilizations; CR- Contract/relax; SP- Sustained pressure, TrP- Trigger point release, IASTM- Instrument assisted soft tissue mobilizations, TDN- Trigger point dry needling). CTM to bilateral adductors with rolling and strumming. SCS and Trigger point to bilateral deep transverse perineum and superficial transverse  perineum to reduce tone and improve tissue elasticity.      Exercises:  Patient instructed in pelvic floor exercises listed below:   Date:  12/12/2018    Activity/Exercise Parameters   PF drops and relaxation techniques  in supine with biofeedback assist for visual feedback via external anal sensors   15 min total                                   [employer]    The following educational topics were reviewed with patient:   Treatment/Session Assessment:     · Response to Treatment:   Pt reported no change in pain. She did have significant improvement in resting tone after manual internal therapy to the pelvic floor. Her resting tone decreased from 5 uV to 0 uV. · Compliance with Program/Exercises: Will assess as treatment progresses. · Recommendations/Intent for next treatment session: \"Next visit will focus on advancements to more challenging activities\".   Total Treatment Duration:  PT Patient Time In/Time Out  Time In: 1100  Time Out: [address], PT    Future Appointments   Date Time Provider Jose Cruz Maier   12/14/2018  5:45 PM [facility] ROOM 2 [facility] [place]   12/20/2018  9:00 AM Keegan Balderas, PT [facility] [place]   1/4/2019 11:00 AM Keegan Balderas, PT [facility] [place]   1/11/2019 11:00 AM Keegan Balderas, PT [facility] [place]

## 2018-12-20 ENCOUNTER — HOSPITAL ENCOUNTER (OUTPATIENT)
Dept: PHYSICAL THERAPY | Age: 33
Discharge: HOME | End: 2018-12-20
Payer: COMMERCIAL

## 2018-12-20 PROCEDURE — 97110 THERAPEUTIC EXERCISES: CPT

## 2018-12-20 PROCEDURE — 97140 MANUAL THERAPY 1/> REGIONS: CPT

## 2018-12-20 NOTE — PROGRESS NOTES
Garrick Rojas  : 1985  Primary: Zane Mckenzie  Secondary:  [facility]  [address] [address],[address]  [phone]   [phone]          OUTPATIENT PHYSICAL THERAPY:Daily Note 2018    ICD-10: Treatment Diagnosis: Myalgia (M79.1)Fibromyalgia (M79.7)Other lack of coordination (R27.8)   Precautions/Allergies:   Codeine; Dermagraft [cult skin subst, human-bovine]; and Percocet [oxycodone-acetaminophen]   MD Orders: Eval and treat MEDICAL/REFERRING DIAGNOSIS:  Pelvic and perineal pain [R10.2]   DATE OF ONSET: Chronic  REFERRING PHYSICIAN: Mika Dang, *  RETURN PHYSICIAN APPOINTMENT: TBD     INITIAL ASSESSMENT:  Ms. Sheldon Cordero returns to therapy after having surgery with Dr. Kanchan Bhatti and botox with Dr. Kristopher Perez. She is now 3 weeks post op her endometrial surgery. She had previous Botox prior to surgery in the pelvic floor mm. . Her surgical and therapy history is very long and complex. She has had multiple surgery's and multiple encoutners with pelvic floor rehabilitation. Today in the clinic, I was only able to palpate at the introitus and the first layer of pelvic floor mm. Pt reported significant pain with palpation at the vaginal opening, labia majora, and abdomen. Therefore, I suggested we wait two weeks until her next visit. She was agreeable with POC. Pt is an excellent candidate for pelvic floor therapy due to her limitations with sitting, standing, penetrative intercourse, PMH, and significant level of pain. Pt would benefit from skilled PT. PROBLEM LIST (Impacting functional limitations):  1. Decreased Strength  2. Decreased ADL/Functional Activities  3. Increased Pain  4. Decreased Flexibility/Joint Mobility  5. Decreased [place] with Home Exercise Program INTERVENTIONS PLANNED:  1. Neuromuscular re-education  2. Biofeedback as needed  3. HEP  4. Bladder retraining  5. Bladder education  6.  Electrical Stimulation  7. Home Exercise Program (HEP)  8. Manual Therapy  9. Range of Motion (ROM)  10. Therapeutic Exercise/Strengthening  11. Ultrasound (US)  12. low level light therapy   TREATMENT PLAN:  Effective Dates: 11/30/2018 TO 2/28/2019 (90 days). Frequency/Duration: 2 times a week for 90 Days  GOALS: (Goals have been discussed and agreed upon with patient.)  Short-Term Functional Goals: Time Frame: in 4 weeks  1. Patient will demonstrate independence with home exercise program.  Discharge Goals: Time Frame: TBD  Rehabilitation Potential For Stated Goals: Fair  Regarding Dayana Rojas's therapy, I certify that the treatment plan above will be carried out by a therapist or under their direction. Thank you for this referral,  Lachelle Smith, PT     Referring Physician Signature: Dearl Markus, *              Date                    The information in this section was collected on 12/20/2018  (except where otherwise noted). HISTORY:   History of Present Injury/Illness (Reason for Referral):  Dx at 13 with endometriosis. That was when she had her first laprascropy. Has had 4 laparoscopy. First vaginal delivery 2009. Second child was born 2010. Had a vaginal delivery. Did do an ablation  which lasted for about one year. 2014 had a hysterectomy. Got sick after surgery. 8 weeks later had a vaginal dehiscence. Was septic then. Did not rebuild the cuff. The endo starts to attach the vaginal cuff. Started seeing Arnulfo Macedo for pelvic floor therapy. Maricarmen Delgadillo is having difficulty with palpation. Nov 2016 had a excision surgery for endometriosis. They build up the cuff. They tacked the left ovary. Eventually took out the left ovary in 2017 and found endo in the pelvis. In 2017, was dx with fibro. Ella Tineo tried to ablation of the ilioinguinal nerve. Had necrosis around the hip bone. Recently had Botox with . Dr Robert Villarreal had a cytoscopy and the bladder was healthy.   Went to see Dr. Trang Schmidt to redo surgery in the pelvis for possible endo. Deadend nerve and it feels better. Vaginal valium in the past was helpful. Needling does help pain, soft tissue, and deep tissues. Unable to stand for any period of time. Does better with movement. Had tailbone pain for a while and does use cushions. Feels burring at the [facility]. Past Medical History/Comorbidities:   Ms. Hazle Kocher  has a past medical history of Anxiety, Bladder pain, Chronic pain, Endometriosis, Gluten intolerance, High-tone pelvic floor dysfunction, IBS (irritable bowel syndrome), Migraine, Nausea & vomiting, Ovarian cyst, PUD (peptic ulcer disease), Unspecified adverse effect of anesthesia, UTI (urinary tract infection), and Vaginal cuff dehiscence. Ms. Hazle Kocher  has a past surgical history that includes pr endometrial cryoablation; hx other surgical; hx bilateral salpingectomy; hx other surgical (11/2016); hx total laparoscopic hysterectomy; hx breast augmentation (2003); hx gyn (2000, 2004); hx dilation and curettage; hx pelvic laparoscopy (11/03/2016); hx oophorectomy (Left); hx other surgical (10/30/2018); hx other surgical (11/06/2018); CYSTOSCOPY PELVIC FLOOR INJECTION OF BOTOX, chemodenervation  and pudendal nerve block (N/A, 10/30/2018); BILATERAL LUMBAR SYMPATHETIC BLOCK (Bilateral, 12/21/2017); IR ANESTHESIA// UTERINE FIBROID UTILIZATION (N/A, 2/8/2016); HYSTERECTOMY TOTAL LAPAROSCOPIC (N/A, 5/30/2014); ENDOMETRIAL ABLATION (N/A, 2/16/2012); and DILATATION AND CURETTAGE HYSTEROSCOPY (N/A, 2/16/2012). Social History/Living Environment:    Has 3 children, works during the day at "[employer]  Prior Level of Function/Work/Activity:  Has been having pain for many years. Previous Treatment Approaches:          Pelvic floor therapy.       Ambulatory/Rehab Services H2 Model Falls Risk Assessment    Risk Factors:       No Risk Factors Identified Ability to Rise from Chair:       (0)  Ability to rise in a single movement    Falls Prevention Plan: No modifications necessary   Total: (5 or greater = High Risk): 0    ©2010 [facility] [address] [place] [address] States Patent #[id number]. Federal Law prohibits the replication, distribution or use without written permission from [facility]     Current Medications:    Current Outpatient Medications:     pregabalin (LYRICA) 75 mg capsule, Take 75 mg by mouth two (2) times a day., Disp: , Rfl:     sertraline (ZOLOFT) 100 mg tablet, Take 150 mg by mouth nightly., Disp: , Rfl:     diazePAM (VALIUM) 10 mg tablet, 10 mg. Vaginal suppository--uses every other night, Disp: , Rfl:     omega 3-DHA-EPA-fish oil 1,000 mg (120 mg-180 mg) capsule, Take 1 Cap by mouth., Disp: , Rfl:     melatonin 5 mg cap capsule, Take 10 mg by mouth nightly., Disp: , Rfl:     estradiol (ESTRACE) 1 mg tablet, TK 1 T PO QHS, Disp: , Rfl: 11    methen-sod phos-meth blue-hyos (UROGESIC-BLUE) 81.6-40.8-0.12 mg tab, May take 1-3 tabs per day as needed for bladder pain, Disp: 60 Tab, Rfl: 2    methocarbamol (ROBAXIN) 500 mg tablet, Take 500 mg by mouth two (2) times a day., Disp: , Rfl:     UBIDECARENONE/VITAMIN E MIXED (COQ10  PO), Take  by mouth nightly., Disp: , Rfl:     VIT B2/NIACIN/B6/B12/DEXPANTH (B COMPLEX SL), by SubLINGual route daily. , Disp: , Rfl:     traMADol (ULTRAM) 50 mg tablet, Take 1-2 Tabs by mouth every six (6) hours as needed for Pain. Max Daily Amount: 400 mg., Disp: 60 Tab, Rfl: 0    clonazePAM (KLONOPIN) 0.5 mg tablet, Take  by mouth nightly as needed. , Disp: , Rfl:     promethazine (PHENERGAN) 25 mg tablet, Take 25 mg by mouth every six (6) hours as needed for Nausea., Disp: , Rfl:     ibuprofen (MOTRIN) 800 mg tablet, Take 1 Tab by mouth every six (6) hours as needed. (Patient taking differently: Take 800 mg by mouth every six (6) hours as needed. Last dose 12/17/17), Disp: 30 Tab, Rfl: 0    magnesium oxide (MAG-OX) 400 mg tablet, Take 400 mg by mouth daily. , Disp: , Rfl:    loratadine (CLARITIN) 10 mg tablet, Take 10 mg by mouth every evening., Disp: , Rfl:     LACTOBACILLUS ACIDOPHILUS (PROBIOTIC PO), Take  by mouth., Disp: , Rfl:     multivitamin (ONE A DAY) tablet, Take 1 Tab by mouth daily. , Disp: , Rfl:    Date Last Reviewed:  12/20/2018   Voiding Patterns:  Patient voids every 4 hours during the day and 1 times during the night. Patient reports that she does not empties bladder. Does not leak. Fluid Intake:  Patient drinks 1 gallon of water per day. She consumes 1 cups of caffeinated beverages per day. Patient not limit fluid intake to control bladder. Bowel Habits:  Patient demonstrates constipation. Probiotic, citricel, linzes, mirilax, smooth move. Bowel movements are soft  Personal / Social History:  · Sexually active? Yes, but very painful       Number of Personal Factors/Comorbidities that affect the Plan of Care: 3+: HIGH COMPLEXITY   EXAMINATION:   External Observation:     · Anal Blaine: present  · Skin Integrity: WNL  · Q-tip Test: Painful  Palpation:     12/20/2018   Right Left   Bulbocavernosus Pain 4/10 Pain 4/10   Ischocavernosus     Superficial Transverse Perineal     Sphincter Uhrovaginal     Compressor Urethra      Obturator Internus Pain 2/10 Pain 4/10   Iliococcygeus Pain 4/10 Pain 4/10   Coccygeus     Pubococcygeus Pain 4/10 Pain 4/10   Levator Ani     Adductor tenderness Tenderness                              Body Structures Involved:  1. Muscles Body Functions Affected:  1. Genitourinary  2. Neuromusculoskeletal Activities and Participation Affected:  1. Mobility  2. Self Care  3. Interpersonal Interactions and Relationships   Number of elements that affect the Plan of Care: 4+: HIGH COMPLEXITY   CLINICAL PRESENTATION:   Presentation: Evolving clinical presentation with unstable and unpredictable characteristics: HIGH COMPLEXITY   CLINICAL DECISION MAKING:   Outcome Measure:    Tool Used: Pelvic Floor Disability Index (PFDI-20)  Score:  Initial: 56 Most Recent: X (Date: -- )   Interpretation of Score: This survey asks questions concerning certain  bowel, bladder, or pelvic symptoms and how much this symptoms interfere with daily activiites. Each section is scored on a 0-4 scale, 4 representing the greatest disability. The scores of each section are added together for a total score out of 70. Score 0 1-13 14-27 28-41 42-55 56-69 70   Modifier CH CI CJ CK CL CM CN     Medical Necessity:   · Patient is expected to demonstrate progress in coordination to decrease assistance required with pelvic floor drops. Reason for Services/Other Comments:  · Patient continues to require skilled intervention due to pelvic pain. Use of outcome tool(s) and clinical judgement create a POC that gives a: Difficult prediction of patient's progress: HIGH COMPLEXITY   TREATMENT:   888(In addition to Assessment/Re-Assessment sessions the following treatments were rendered)  Pre-treatment Symptoms/Complaints:  Pt attempted penetrative intercourse this week. Was unable to do so. Did have an orgasm which did increase pt pain. Pain: Initial:   Pain Intensity 1: 4  Pain Location 1: Pelvis  Post Session:  4/10     THERAPEUTIC EXERCISE: (10 minutes):  Exercises per grid below to improve coordination. Required minimal visual and tactile cues to promote proper body breathing techniques. Progressed complexity of movement as indicated. MANUAL THERAPY: (35 minutes): Soft tissue mobilization was utilized and necessary because of the patient's painful spasm and restricted motion of soft tissue. (Used abbreviations: MET - muscle energy technique; SCS- Strain counter strain; CTM- Connective tissue mobilizations; CR- Contract/relax; SP- Sustained pressure, TrP- Trigger point release, IASTM- Instrument assisted soft tissue mobilizations, TDN- Trigger point dry needling). CTM to bilateral adductors with rolling and strumming. Nerve flossing to pudendal n.    SCS and Trigger point to bilateral deep transverse perineum and superficial transverse, levator ani and oI  perineum to reduce tone and improve tissue elasticity. Exercises:  Patient instructed in pelvic floor exercises listed below:   Date:  12/20/2018    Activity/Exercise Parameters   PF drops and relaxation techniques  in supine with biofeedback assist for visual feedback via external anal sensors   X 10   Nu step 5 min level 4                               [employer]    The following educational topics were reviewed with patient:   Treatment/Session Assessment:     · Response to Treatment:   Pt did well with therapy today. Was able to palpate the pelvic floor muscles with pain less than 5/10. Right obturator had minimal tenderness compared to left. Did will with all exercises. Will continue to increase exercises as tolerated. · Compliance with Program/Exercises: Will assess as treatment progresses. · Recommendations/Intent for next treatment session: \"Next visit will focus on advancements to more challenging activities\".   Total Treatment Duration:  PT Patient Time In/Time Out  Time In: 0800  Time Out: 0900    Antonia Bishop, PT    Future Appointments   Date Time Provider Jose Cruz Maier   12/31/2018  9:00 AM Joel Ruelas PT [facility]   1/4/2019  7:00 PM Joel Ruelas, PT [facility]   1/11/2019 11:00 AM Joel Ruelas PT [facility]   1/30/2019  8:00 AM Joel Ruelas, PT [facility]   2/8/2019  1:00 PM Joel Ruelas, PT [facility]   2/15/2019 10:00 AM Joel Ruelas PT [facility]   2/22/2019 10:00 AM Joel Ruelas, PT [facility]   3/1/2019 10:00 AM Joel Ruelas, PT [facility]

## 2018-12-31 ENCOUNTER — HOSPITAL ENCOUNTER (OUTPATIENT)
Dept: PHYSICAL THERAPY | Age: 33
Discharge: HOME | End: 2018-12-31
Payer: COMMERCIAL

## 2018-12-31 PROCEDURE — 97110 THERAPEUTIC EXERCISES: CPT

## 2018-12-31 PROCEDURE — 97140 MANUAL THERAPY 1/> REGIONS: CPT

## 2018-12-31 NOTE — PROGRESS NOTES
Lennie Rojas  : 1985  Primary: [facility]  Secondary:  [facility] at [facility]  [address], [address], [address]  [phone]   [phone]          OUTPATIENT PHYSICAL THERAPY:Daily Note 2018    ICD-10: Treatment Diagnosis: Myalgia (M79.1)Fibromyalgia (M79.7)Other lack of coordination (R27.8)   Precautions/Allergies:   Codeine; Dermagraft [cult skin subst, human-bovine]; and Percocet [oxycodone-acetaminophen]   MD Orders: Eval and treat MEDICAL/REFERRING DIAGNOSIS:  Pelvic and perineal pain [R10.2]   DATE OF ONSET: Chronic  REFERRING PHYSICIAN: Tarun Marr, *  RETURN PHYSICIAN APPOINTMENT: TBD     INITIAL ASSESSMENT:  Ms. Janna Mortensen returns to therapy after having surgery with Dr. Rebecca Pereyra and botox with Dr. Maty Cook. She is now 3 weeks post op her endometrial surgery. She had previous Botox prior to surgery in the pelvic floor mm. . Her surgical and therapy history is very long and complex. She has had multiple surgery's and multiple encoutners with pelvic floor rehabilitation. Today in the clinic, I was only able to palpate at the introitus and the first layer of pelvic floor mm. Pt reported significant pain with palpation at the vaginal opening, labia majora, and abdomen. Therefore, I suggested we wait two weeks until her next visit. She was agreeable with POC. Pt is an excellent candidate for pelvic floor therapy due to her limitations with sitting, standing, penetrative intercourse, PMH, and significant level of pain. Pt would benefit from skilled PT. PROBLEM LIST (Impacting functional limitations):  1. Decreased Strength  2. Decreased ADL/Functional Activities  3. Increased Pain  4. Decreased Flexibility/Joint Mobility  5. Decreased Keller with Home Exercise Program INTERVENTIONS PLANNED:  1. Neuromuscular re-education  2. Biofeedback as needed  3. HEP  4. Bladder retraining  5. Bladder education  6.  Electrical Stimulation  7. Home Exercise Program (HEP)  8. Manual Therapy  9. Range of Motion (ROM)  10. Therapeutic Exercise/Strengthening  11. Ultrasound (US)  12. low level light therapy   TREATMENT PLAN:  Effective Dates: 11/30/2018 TO 2/28/2019 (90 days). Frequency/Duration: 2 times a week for 90 Days  GOALS: (Goals have been discussed and agreed upon with patient.)  Short-Term Functional Goals: Time Frame: in 4 weeks  1. Patient will demonstrate independence with home exercise program.  Discharge Goals: Time Frame: TBD  Rehabilitation Potential For Stated Goals: Fair  Regarding Dayana Rojas's therapy, I certify that the treatment plan above will be carried out by a therapist or under their direction. Thank you for this referral,  Erickson Mcgraw PT     Referring Physician Signature: Anup oYder, *              Date                    The information in this section was collected on 12/31/2018  (except where otherwise noted). HISTORY:   History of Present Injury/Illness (Reason for Referral):  Dx at 13 with endometriosis. That was when she had her first laprascropy. Has had 4 laparoscopy. First vaginal delivery 2009. Second child was born 2010. Had a vaginal delivery. Did do an ablation  which lasted for about one year. 2014 had a hysterectomy. Got sick after surgery. 8 weeks later had a vaginal dehiscence. Was septic then. Did not rebuild the cuff. The endo starts to attach the vaginal cuff. Started seeing Yunior Stover for pelvic floor therapy. Christopher Gudino is having difficulty with palpation. Nov 2016 had a excision surgery for endometriosis. They build up the cuff. They tacked the left ovary. Eventually took out the left ovary in 2017 and found endo in the pelvis. In 2017, was dx with fibro. Natali Coleman tried to ablation of the ilioinguinal nerve. Had necrosis around the hip bone. Recently had Botox with Dr. Dr Marvin Middleton had a cytoscopy and the bladder was healthy.   Went to see Dr. Roxana Pierson to redo surgery in the pelvis for possible endo. Deadend nerve and it feels better. Vaginal valium in the past was helpful. Needling does help pain, soft tissue, and deep tissues. Unable to stand for any period of time. Does better with movement. Had tailbone pain for a while and does use cushions. [facility]els [facility]ring at the [facility]. Past Medical History/Comorbidities:   Ms. Crescencio Olivares  has a past medical history of Anxiety, Bladder pain, Chronic pain, Endometriosis, Gluten intolerance, High-tone pelvic floor dysfunction, IBS (irritable bowel syndrome), Migraine, Nausea & vomiting, Ovarian cyst, PUD (peptic ulcer disease), Unspecified adverse effect of anesthesia, UTI (urinary tract infection), and Vaginal cuff dehiscence. Ms. Crescencio Olivares  has a past surgical history that includes pr endometrial cryoablation; hx other surgical; hx bilateral salpingectomy; hx other surgical (11/2016); hx total laparoscopic hysterectomy; hx breast augmentation (2003); hx gyn (2000, 2004); hx dilation and curettage; hx pelvic laparoscopy (11/03/2016); hx oophorectomy (Left); hx other surgical (10/30/2018); hx other surgical (11/06/2018); CYSTOSCOPY PELVIC FLOOR INJECTION OF BOTOX, chemodenervation  and pudendal nerve block (N/A, 10/30/2018); BILATERAL LUMBAR SYMPATHETIC BLOCK (Bilateral, 12/21/2017); IR ANESTHESIA// UTERINE FIBROID UTILIZATION (N/A, 2/8/2016); HYSTERECTOMY TOTAL LAPAROSCOPIC (N/A, 5/30/2014); ENDOMETRIAL ABLATION (N/A, 2/16/2012); and DILATATION AND CURETTAGE HYSTEROSCOPY (N/A, 2/16/2012). Social History/Living Environment:    Has 3 children, works during the day at [employer]  Prior Level of Function/Work/Activity:  Has been having pain for many years. Previous Treatment Approaches:          Pelvic floor therapy.       Ambulatory/Rehab Services H2 Model Falls Risk Assessment    Risk Factors:       No Risk Factors Identified Ability to Rise from Chair:       (0)  Ability to rise in a single movement    Falls Prevention Plan: No modifications necessary   Total: (5 or greater = High Risk): 0    ©2010 [facility] of [place] [address] [place] [address] States Patent #[id number]. Federal Law prohibits the replication, distribution or use without written permission from [facility] of [address] [place] [address]     Current Medications:    Current Outpatient Medications:     pregabalin (LYRICA) 75 mg capsule, Take 75 mg by mouth two (2) times a day., Disp: , Rfl:     sertraline (ZOLOFT) 100 mg tablet, Take 150 mg by mouth nightly., Disp: , Rfl:     diazePAM (VALIUM) 10 mg tablet, 10 mg. Vaginal suppository--uses every other night, Disp: , Rfl:     omega 3-DHA-EPA-fish oil 1,000 mg (120 mg-180 mg) capsule, Take 1 Cap by mouth., Disp: , Rfl:     melatonin 5 mg cap capsule, Take 10 mg by mouth nightly., Disp: , Rfl:     estradiol (ESTRACE) 1 mg tablet, TK 1 T PO QHS, Disp: , Rfl: 11    methen-sod phos-meth blue-hyos (UROGESIC-BLUE) 81.6-40.8-0.12 mg tab, May take 1-3 tabs per day as needed for bladder pain, Disp: 60 Tab, Rfl: 2    methocarbamol (ROBAXIN) 500 mg tablet, Take 500 mg by mouth two (2) times a day., Disp: , Rfl:     UBIDECARENONE/VITAMIN E MIXED (COQ10  PO), Take  by mouth nightly., Disp: , Rfl:     VIT B2/NIACIN/B6/B12/DEXPANTH (B COMPLEX SL), by SubLINGual route daily. , Disp: , Rfl:     traMADol (ULTRAM) 50 mg tablet, Take 1-2 Tabs by mouth every six (6) hours as needed for Pain. Max Daily Amount: 400 mg., Disp: 60 Tab, Rfl: 0    clonazePAM (KLONOPIN) 0.5 mg tablet, Take  by mouth nightly as needed. , Disp: , Rfl:     promethazine (PHENERGAN) 25 mg tablet, Take 25 mg by mouth every six (6) hours as needed for Nausea., Disp: , Rfl:     ibuprofen (MOTRIN) 800 mg tablet, Take 1 Tab by mouth every six (6) hours as needed. (Patient taking differently: Take 800 mg by mouth every six (6) hours as needed. Last dose 12/17/17), Disp: 30 Tab, Rfl: 0    magnesium oxide (MAG-OX) 400 mg tablet, Take 400 mg by mouth daily. , Disp: , Rfl:    loratadine (CLARITIN) 10 mg tablet, Take 10 mg by mouth every evening., Disp: , Rfl:     LACTOBACILLUS ACIDOPHILUS (PROBIOTIC PO), Take  by mouth., Disp: , Rfl:     multivitamin (ONE A DAY) tablet, Take 1 Tab by mouth daily. , Disp: , Rfl:    Date Last Reviewed:  12/31/2018   Voiding Patterns:  Patient voids every 4 hours during the day and 1 times during the night. Patient reports that she does not empties bladder. Does not leak. Fluid Intake:  Patient drinks 1 gallon of water per day. She consumes 1 cups of caffeinated beverages per day. Patient not limit fluid intake to control bladder. Bowel Habits:  Patient demonstrates constipation. Probiotic, citricel, linzes, mirilax, smooth move. Bowel movements are soft  Personal / Social History:  · Sexually active? Yes, but very painful       Number of Personal Factors/Comorbidities that affect the Plan of Care: 3+: HIGH COMPLEXITY   EXAMINATION:   External Observation:     · Anal Burton: present  · Skin Integrity: WNL  · Q-tip Test: Painful  Palpation:     12/20/2018   Right Left   Bulbocavernosus Pain 4/10 Pain 4/10   Ischocavernosus     Superficial Transverse Perineal     Sphincter Uhrovaginal     Compressor Urethra      Obturator Internus Pain 2/10 Pain 4/10   Iliococcygeus Pain 4/10 Pain 4/10   Coccygeus     Pubococcygeus Pain 4/10 Pain 4/10   Levator Ani     Adductor tenderness Tenderness    Glut medius  Tenderness at medial insersion                        Body Structures Involved:  1. Muscles Body Functions Affected:  1. Genitourinary  2. Neuromusculoskeletal Activities and Participation Affected:  1. Mobility  2. Self Care  3. Interpersonal Interactions and Relationships   Number of elements that affect the Plan of Care: 4+: HIGH COMPLEXITY   CLINICAL PRESENTATION:   Presentation: Evolving clinical presentation with unstable and unpredictable characteristics: HIGH COMPLEXITY   CLINICAL DECISION MAKING:   Outcome Measure:    Tool Used: Pelvic Floor Disability Index (PFDI-20)  Score:  Initial: 56 Most Recent: X (Date: -- )   Interpretation of Score: This survey asks questions concerning certain  bowel, bladder, or pelvic symptoms and how much this symptoms interfere with daily activiites. Each section is scored on a 0-4 scale, 4 representing the greatest disability. The scores of each section are added together for a total score out of 70. Score 0 1-13 14-27 28-41 42-55 56-69 70   Modifier CH CI CJ CK CL CM CN     Medical Necessity:   · Patient is expected to demonstrate progress in coordination to decrease assistance required with pelvic floor drops. Reason for Services/Other Comments:  · Patient continues to require skilled intervention due to pelvic pain. Use of outcome tool(s) and clinical judgement create a POC that gives a: Difficult prediction of patient's progress: HIGH COMPLEXITY   TREATMENT:   888(In addition to Assessment/Re-Assessment sessions the following treatments were rendered)  Pre-treatment Symptoms/Complaints:  Pt attempted penetrative intercourse this week but it was too painful. Was sore for about 2 days after last treatment. Pain: Initial:   Pain Intensity 1: 4  Pain Location 1: Pelvis  Post Session:  4/10     THERAPEUTIC EXERCISE: (10 minutes):  Exercises per grid below to improve coordination. Required minimal visual and tactile cues to promote proper body breathing techniques. Progressed complexity of movement as indicated. MANUAL THERAPY: (45 minutes): Soft tissue mobilization was utilized and necessary because of the patient's painful spasm and restricted motion of soft tissue. (Used abbreviations: MET - muscle energy technique; SCS- Strain counter strain; CTM- Connective tissue mobilizations; CR- Contract/relax; SP- Sustained pressure, TrP- Trigger point release, IASTM- Instrument assisted soft tissue mobilizations, TDN- Trigger point dry needling). CTM to bilateral adductors with rolling and strumming.   Nerve flossing to pudendal n. SCS and Trigger point to bilateral deep transverse perineum and superficial transverse, levator ani and oI  perineum to reduce tone and improve tissue elasticity. Edge and cupping  to adductors with pt verbal consent. Exercises:  Patient instructed in pelvic floor exercises listed below:   Date:  12/31/2018    Activity/Exercise Parameters   PF drops and relaxation techniques  in supine with biofeedback assist for visual feedback via external anal sensors      Nu step 8 min level 4   Left external rotation stretch 10 sec x 5                           [employer]    The following educational topics were reviewed with patient:   Treatment/Session Assessment:     · Response to Treatment:   Pt reported increased pain throughout the pelvic floor with palpation. Did have some pain also at bilateral glut tenderness. Pt to try ER stretch at home with minimal stretch. · Compliance with Program/Exercises: Will assess as treatment progresses. · Recommendations/Intent for next treatment session: \"Next visit will focus on advancements to more challenging activities\".   Total Treatment Duration:  PT Patient Time In/Time Out  Time In: 0900  Time Out: Jude Randall, PT    Future Appointments   Date Time Provider Jose Cruz Maier   1/4/2019  7:00 PM Sj Maher, PT [facility]   1/11/2019 11:00 AM Sj Maher, PT [facility]   1/30/2019  8:00 AM Sj Maher, PT [facility]   2/8/2019  1:00 PM Sj Maher, PT [facility]   2/15/2019 10:00 AM Sj Maher, PT [facility]   2/22/2019 10:00 AM Sj Maher, PT [facility]   3/1/2019 10:00 AM Sj Maher, PT [facility]   3/7/2019 11:00 AM Sj Maher, PT [facility]   3/15/2019 10:00 AM Sj Maher, PT [facility]   3/22/2019 10:00 AM Sj Maher, PT [facility]   3/29/2019 10:00 AM Sj Maher, PT [facility]

## 2019-01-11 ENCOUNTER — HOSPITAL ENCOUNTER (OUTPATIENT)
Dept: PHYSICAL THERAPY | Age: 34
Discharge: HOME | End: 2019-01-11
Payer: COMMERCIAL

## 2019-01-11 PROCEDURE — 97110 THERAPEUTIC EXERCISES: CPT

## 2019-01-11 PROCEDURE — 97140 MANUAL THERAPY 1/> REGIONS: CPT

## 2019-01-11 NOTE — PROGRESS NOTES
Anabel Rojas  : 1985  Primary: Alverto Mckenzie  Secondary:  [facility] [address], [address] [place] [address],[address], [address]  [phone]   [phone]          OUTPATIENT PHYSICAL THERAPY:Daily Note 2019    ICD-10: Treatment Diagnosis: Myalgia (M79.1)Fibromyalgia (M79.7)Other lack of coordination (R27.8)   Precautions/Allergies:   Codeine; Dermagraft [cult skin subst, human-bovine]; and Percocet [oxycodone-acetaminophen]   MD Orders: Eval and treat MEDICAL/REFERRING DIAGNOSIS:  Pelvic and perineal pain [R10.2]   DATE OF ONSET: Chronic  REFERRING PHYSICIAN: Justyna Manzanares, *  RETURN PHYSICIAN APPOINTMENT: TBD     INITIAL ASSESSMENT:  Ms. Becky Oneil returns to therapy after having surgery with Dr. Armida Hawkins and botox with Dr. Manfred Baumgarten. She is now 3 weeks post op her endometrial surgery. She had previous Botox prior to surgery in the pelvic floor mm. . Her surgical and therapy history is very long and complex. She has had multiple surgery's and multiple encoutners with pelvic floor rehabilitation. Today in the clinic, I was only able to palpate at the introitus and the first layer of pelvic floor mm. Pt reported significant pain with palpation at the vaginal opening, labia majora, and abdomen. Therefore, I suggested we wait two weeks until her next visit. She was agreeable with POC. Pt is an excellent candidate for pelvic floor therapy due to her limitations with sitting, standing, penetrative intercourse, PMH, and significant level of pain. Pt would benefit from skilled PT. PROBLEM LIST (Impacting functional limitations):  1. Decreased Strength  2. Decreased ADL/Functional Activities  3. Increased Pain  4. Decreased Flexibility/Joint Mobility  5. Decreased [place] with Home Exercise Program INTERVENTIONS PLANNED:  1. Neuromuscular re-education  2. Biofeedback as needed  3. HEP  4. Bladder retraining  5. Bladder education  6.  Electrical Stimulation  7. Home Exercise Program (HEP)  8. Manual Therapy  9. Range of Motion (ROM)  10. Therapeutic Exercise/Strengthening  11. Ultrasound (US)  12. low level light therapy   TREATMENT PLAN:  Effective Dates: 11/30/2018 TO 2/28/2019 (90 days). Frequency/Duration: 2 times a week for 90 Days  GOALS: (Goals have been discussed and agreed upon with patient.)  Short-Term Functional Goals: Time Frame: in 4 weeks  1. Patient will demonstrate independence with home exercise program.  Discharge Goals: Time Frame: TBD  Rehabilitation Potential For Stated Goals: Fair  Regarding Dayana Rojas's therapy, I certify that the treatment plan above will be carried out by a therapist or under their direction. Thank you for this referral,  Marry Haq, PT     Referring Physician Signature: Angelique Urias, *              Date                    The information in this section was collected on 1/11/2019  (except where otherwise noted). HISTORY:   History of Present Injury/Illness (Reason for Referral):  Dx at 13 with endometriosis. That was when she had her first laprascropy. Has had 4 laparoscopy. First vaginal delivery 2009. Second child was born 2010. Had a vaginal delivery. Did do an ablation  which lasted for about one year. 2014 had a hysterectomy. Got sick after surgery. 8 weeks later had a vaginal dehiscence. Was septic then. Did not rebuild the cuff. The endo starts to attach the vaginal cuff. Started seeing [facility] for pelvic floor therapy. Chelsea Christy is having difficulty with palpation. Nov 2016 had a excision surgery for endometriosis. They build up the cuff. They tacked the left ovary. Eventually took out the left ovary in 2017 and found endo in the pelvis. In 2017, was dx with fibro. Eloy Gonzalez tried to ablation of the ilioinguinal nerve. Had necrosis around the hip bone. Recently had Botox with . Dr Selene Marie had a cytoscopy and the bladder was healthy.   Went to see Dr. Fina Canales to redo surgery in the pelvis for possible endo. Deadend nerve and it feels better. Vaginal valium in the past was helpful. Needling does help pain, soft tissue, and deep tissues. Unable to stand for any period of time. Does better with movement. Had tailbone pain for a while and does use cushions. Feels burring at the [facility]. Past Medical History/Comorbidities:   Ms. Louie Velazquez  has a past medical history of Anxiety, Bladder pain, Chronic pain, Endometriosis, Gluten intolerance, High-tone pelvic floor dysfunction, IBS (irritable bowel syndrome), Migraine, Nausea & vomiting, Ovarian cyst, PUD (peptic ulcer disease), Unspecified adverse effect of anesthesia, UTI (urinary tract infection), and Vaginal cuff dehiscence. Ms. Louie Velazquez  has a past surgical history that includes pr endometrial cryoablation; hx other surgical; hx bilateral salpingectomy; hx other surgical (11/2016); hx total laparoscopic hysterectomy; hx breast augmentation (2003); hx gyn (2000, 2004); hx dilation and curettage; hx pelvic laparoscopy (11/03/2016); hx oophorectomy (Left); hx other surgical (10/30/2018); hx other surgical (11/06/2018); CYSTOSCOPY PELVIC FLOOR INJECTION OF BOTOX, chemodenervation  and pudendal nerve block (N/A, 10/30/2018); BILATERAL LUMBAR SYMPATHETIC BLOCK (Bilateral, 12/21/2017); IR ANESTHESIA// UTERINE FIBROID UTILIZATION (N/A, 2/8/2016); HYSTERECTOMY TOTAL LAPAROSCOPIC (N/A, 5/30/2014); ENDOMETRIAL ABLATION (N/A, 2/16/2012); and DILATATION AND CURETTAGE HYSTEROSCOPY (N/A, 2/16/2012). Social History/Living Environment:    Has 3 children, works during the day at [employer]  Prior Level of Function/Work/Activity:  Has been having pain for many years. Previous Treatment Approaches:          Pelvic floor therapy.       Ambulatory/Rehab Services H2 Model Falls Risk Assessment    Risk Factors:       No Risk Factors Identified Ability to Rise from Chair:       (0)  Ability to rise in a single movement    Falls Prevention Plan: No modifications necessary   Total: (5 or greater = High Risk): 0    ©2010 [facility] [address] [place] [address] States Patent #[id number]. Federal Law prohibits the replication, distribution or use without written permission from [facility] [employer]     Current Medications:    Current Outpatient Medications:     pregabalin (LYRICA) 75 mg capsule, Take 75 mg by mouth two (2) times a day., Disp: , Rfl:     sertraline (ZOLOFT) 100 mg tablet, Take 150 mg by mouth nightly., Disp: , Rfl:     diazePAM (VALIUM) 10 mg tablet, 10 mg. Vaginal suppository--uses every other night, Disp: , Rfl:     omega 3-DHA-EPA-fish oil 1,000 mg (120 mg-180 mg) capsule, Take 1 Cap by mouth., Disp: , Rfl:     melatonin 5 mg cap capsule, Take 10 mg by mouth nightly., Disp: , Rfl:     estradiol (ESTRACE) 1 mg tablet, TK 1 T PO QHS, Disp: , Rfl: 11    methen-sod phos-meth blue-hyos (UROGESIC-BLUE) 81.6-40.8-0.12 mg tab, May take 1-3 tabs per day as needed for bladder pain, Disp: 60 Tab, Rfl: 2    methocarbamol (ROBAXIN) 500 mg tablet, Take 500 mg by mouth two (2) times a day., Disp: , Rfl:     UBIDECARENONE/VITAMIN E MIXED (COQ10  PO), Take  by mouth nightly., Disp: , Rfl:     VIT B2/NIACIN/B6/B12/DEXPANTH (B COMPLEX SL), by SubLINGual route daily. , Disp: , Rfl:     traMADol (ULTRAM) 50 mg tablet, Take 1-2 Tabs by mouth every six (6) hours as needed for Pain. Max Daily Amount: 400 mg., Disp: 60 Tab, Rfl: 0    clonazePAM (KLONOPIN) 0.5 mg tablet, Take  by mouth nightly as needed. , Disp: , Rfl:     promethazine (PHENERGAN) 25 mg tablet, Take 25 mg by mouth every six (6) hours as needed for Nausea., Disp: , Rfl:     ibuprofen (MOTRIN) 800 mg tablet, Take 1 Tab by mouth every six (6) hours as needed. (Patient taking differently: Take 800 mg by mouth every six (6) hours as needed. Last dose 12/17/17), Disp: 30 Tab, Rfl: 0    magnesium oxide (MAG-OX) 400 mg tablet, Take 400 mg by mouth daily. , Disp: , Rfl:    loratadine (CLARITIN) 10 mg tablet, Take 10 mg by mouth every evening., Disp: , Rfl:     LACTOBACILLUS ACIDOPHILUS (PROBIOTIC PO), Take  by mouth., Disp: , Rfl:     multivitamin (ONE A DAY) tablet, Take 1 Tab by mouth daily. , Disp: , Rfl:    Date Last Reviewed:  1/11/2019   Voiding Patterns:  Patient voids every 4 hours during the day and 1 times during the night. Patient reports that she does not empties bladder. Does not leak. Fluid Intake:  Patient drinks 1 gallon of water per day. She consumes 1 cups of caffeinated beverages per day. Patient not limit fluid intake to control bladder. Bowel Habits:  Patient demonstrates constipation. Probiotic, citricel, linzes, mirilax, smooth move. Bowel movements are soft  Personal / Social History:  · Sexually active? Yes, but very painful       Number of Personal Factors/Comorbidities that affect the Plan of Care: 3+: HIGH COMPLEXITY   EXAMINATION:   External Observation:     · Anal [place]: present  · Skin Integrity: WNL  · Q-tip Test: Painful  Palpation:     12/20/2018   Right Left   Bulbocavernosus Pain 4/10 Pain 4/10   Ischocavernosus     Superficial Transverse Perineal     Sphincter Uhrovaginal     Compressor Urethra      Obturator Internus Pain 2/10 Pain 4/10   Iliococcygeus Pain 4/10 Pain 4/10   Coccygeus     Pubococcygeus Pain 4/10 Pain 4/10   Levator Ani     Adductor tenderness Tenderness    Glut medius  Tenderness at medial insersion                        Body Structures Involved:  1. Muscles Body Functions Affected:  1. Genitourinary  2. Neuromusculoskeletal Activities and Participation Affected:  1. Mobility  2. Self Care  3. Interpersonal Interactions and Relationships   Number of elements that affect the Plan of Care: 4+: HIGH COMPLEXITY   CLINICAL PRESENTATION:   Presentation: Evolving clinical presentation with unstable and unpredictable characteristics: HIGH COMPLEXITY   CLINICAL DECISION MAKING:   Outcome Measure:    Tool Used: Pelvic Floor Disability Index (PFDI-20)  Score:  Initial: 56 Most Recent: X (Date: -- )   Interpretation of Score: This survey asks questions concerning certain  bowel, bladder, or pelvic symptoms and how much this symptoms interfere with daily activiites. Each section is scored on a 0-4 scale, 4 representing the greatest disability. The scores of each section are added together for a total score out of 70. Score 0 1-13 14-27 28-41 42-55 56-69 70   Modifier CH CI CJ CK CL CM CN     Medical Necessity:   · Patient is expected to demonstrate progress in coordination to decrease assistance required with pelvic floor drops. Reason for Services/Other Comments:  · Patient continues to require skilled intervention due to pelvic pain. Use of outcome tool(s) and clinical judgement create a POC that gives a: Difficult prediction of patient's progress: HIGH COMPLEXITY   TREATMENT:   888(In addition to Assessment/Re-Assessment sessions the following treatments were rendered)  Pre-treatment Symptoms/Complaints:  Pt helped her mom void. She is sore everywhere. Has been taking 3-4 Ultram a day. Feeling a little bit better. Pain: Initial:   Pain Intensity 1: 4  Pain Location 1: Pelvis  Post Session:  4/10     THERAPEUTIC EXERCISE: (8 minutes):  Exercises per grid below to improve coordination. Required minimal visual and tactile cues to promote proper body breathing techniques. Progressed complexity of movement as indicated. MANUAL THERAPY: (45 minutes): Soft tissue mobilization was utilized and necessary because of the patient's painful spasm and restricted motion of soft tissue. (Used abbreviations: MET - muscle energy technique; SCS- Strain counter strain; CTM- Connective tissue mobilizations; CR- Contract/relax; SP- Sustained pressure, TrP- Trigger point release, IASTM- Instrument assisted soft tissue mobilizations, TDN- Trigger point dry needling). CTM to bilateral adductors with rolling and strumming.   Nerve flossing to pudendal n. SCS and Trigger point to bilateral deep transverse perineum and superficial transverse, levator ani and oI  perineum to reduce tone and improve tissue elasticity. (5 min total.)  Edge and cupping  to adductors with pt verbal consent. Exercises:  Patient instructed in pelvic floor exercises listed below:   Date:  1/11/2019    Activity/Exercise Parameters   PF drops and relaxation techniques  in supine with biofeedback assist for visual feedback via external anal sensors      Nu step 8 min level 4   Left external rotation stretch                            Med[employer] Portal    The following educational topics were reviewed with patient:   Treatment/Session Assessment:     · Response to Treatment:   Pt reported increased pain throughout the pelvic floor with palpation today. Left side continues to be tighter than left. · Compliance with Program/Exercises: Will assess as treatment progresses. · Recommendations/Intent for next treatment session: \"Next visit will focus on advancements to more challenging activities\".   Total Treatment Duration:  PT Patient Time In/Time Out  Time In: 1100  Time Out: [address], PT    Future Appointments   Date Time Provider Jose Cruz Maier   1/18/2019  1:00 PM Erick Mon, PT [facility]   1/21/2019  1:00 PM Erick Mon, PT [facility]   1/25/2019  1:00 PM Erick Mon, PT [facility]   1/30/2019  8:00 AM Erick Mon, PT [facility]   2/8/2019  1:00 PM Erick Mon, PT [facility]   2/15/2019 10:00 AM Erick Mon, PT [facility]   2/22/2019 10:00 AM Erick Mon, PT [facility]   3/1/2019 10:00 AM Erick Mon, PT [facility]   3/7/2019 11:00 AM Erick Mon, PT [facility]   3/15/2019 10:00 AM Erick Mon, PT [facility]   3/22/2019 10:00 AM Erick Mon, PT [facility]   3/29/2019 10:00 AM Erick Mon, PT [facility]

## 2019-01-18 ENCOUNTER — HOSPITAL ENCOUNTER (OUTPATIENT)
Dept: PHYSICAL THERAPY | Age: 34
Discharge: HOME | End: 2019-01-18
Payer: COMMERCIAL

## 2019-01-18 PROCEDURE — 97110 THERAPEUTIC EXERCISES: CPT

## 2019-01-18 PROCEDURE — 97140 MANUAL THERAPY 1/> REGIONS: CPT

## 2019-01-21 NOTE — PROGRESS NOTES
Brooke Katz Bob  : 1985  Primary: Mark Mckenzie  Secondary:  [facility] [place] [id number], [address] [place] [address],[address], [address] [facility]  [phone]   [phone]          OUTPATIENT PHYSICAL THERAPY:Daily Note 2019    ICD-10: Treatment Diagnosis: Myalgia (M79.1)Fibromyalgia (M79.7)Other lack of coordination (R27.8)   Precautions/Allergies:   Codeine; Dermagraft [cult skin subst, human-bovine]; and Percocet [oxycodone-acetaminophen]   MD Orders: Eval and treat MEDICAL/REFERRING DIAGNOSIS:  Pelvic and perineal pain [R10.2]   DATE OF ONSET: Chronic  REFERRING PHYSICIAN: Issac Browning, *  RETURN PHYSICIAN APPOINTMENT: TBD     INITIAL ASSESSMENT:  Ms. Harmon Goodpasture returns to therapy after having surgery with Dr. John Peter and botox with Dr. Prabhjot Regan. She is now 3 weeks post op her endometrial surgery. She had previous Botox prior to surgery in the pelvic floor mm. . Her surgical and therapy history is very long and complex. She has had multiple surgery's and multiple encoutners with pelvic floor rehabilitation. Today in the clinic, I was only able to palpate at the introitus and the first layer of pelvic floor mm. Pt reported significant pain with palpation at the vaginal opening, labia majora, and abdomen. Therefore, I suggested we wait two weeks until her next visit. She was agreeable with POC. Pt is an excellent candidate for pelvic floor therapy due to her limitations with sitting, standing, penetrative intercourse, PMH, and significant level of pain. Pt would benefit from skilled PT. PROBLEM LIST (Impacting functional limitations):  1. Decreased Strength  2. Decreased ADL/Functional Activities  3. Increased Pain  4. Decreased Flexibility/Joint Mobility  5. Decreased [place] with Home Exercise Program INTERVENTIONS PLANNED:  1. Neuromuscular re-education  2. Biofeedback as needed  3. HEP  4. Bladder retraining  5. Bladder education  6.  Electrical Stimulation  7. Home Exercise Program (HEP)  8. Manual Therapy  9. Range of Motion (ROM)  10. Therapeutic Exercise/Strengthening  11. Ultrasound (US)  12. low level light therapy   TREATMENT PLAN:  Effective Dates: 11/30/2018 TO 2/28/2019 (90 days). Frequency/Duration: 2 times a week for 90 Days  GOALS: (Goals have been discussed and agreed upon with patient.)  Short-Term Functional Goals: Time Frame: in 4 weeks  1. Patient will demonstrate independence with home exercise program.  Discharge Goals: Time Frame: TBD  Rehabilitation Potential For Stated Goals: Fair  Regarding Dayana Rojas's therapy, I certify that the treatment plan above will be carried out by a therapist or under their direction. Thank you for this referral,  Erlinda Souza, PT     Referring Physician Signature: Hyun Zazueta, *              Date                    The information in this section was collected on 1/21/2019  (except where otherwise noted). HISTORY:   History of Present Injury/Illness (Reason for Referral):  Dx at 13 with endometriosis. That was when she had her first laprascropy. Has had 4 laparoscopy. First vaginal delivery 2009. Second child was born 2010. Had a vaginal delivery. Did do an ablation  which lasted for about one year. 2014 had a hysterectomy. Got sick after surgery. 8 weeks later had a vaginal dehiscence. Was septic then. Did not rebuild the cuff. The endo starts to attach the vaginal cuff. Started seeing [facility] for pelvic floor therapy. Shala Arreola is having difficulty with palpation. Nov 2016 had a excision surgery for endometriosis. They build up the cuff. They tacked the left ovary. Eventually took out the left ovary in 2017 and found endo in the pelvis. In 2017, was dx with fibro. Mariam Granda tried to ablation of the ilioinguinal nerve. Had necrosis around the hip bone. Recently had Botox with . Dr Lyssa Xiong had a cytoscopy and the bladder was healthy.   Went to see Dr. Maria Luz Wiggins to redo surgery in the pelvis for possible endo. Deadend nerve and it feels better. Vaginal valium in the past was helpful. Needling does help pain, soft tissue, and deep tissues. Unable to stand for any period of time. Does better with movement. Had tailbone pain for a while and does use cushions. [facility]els burring at the [facility]. Past Medical History/Comorbidities:   Ms. Kaya Montalvo  has a past medical history of Anxiety (9/6/2013), Bladder pain, Chronic pain, Endometriosis, Gluten intolerance, High-tone pelvic floor dysfunction, IBS (irritable bowel syndrome), Migraine, Nausea & vomiting, Ovarian cyst, PUD (peptic ulcer disease) (12/2013 ), Unspecified adverse effect of anesthesia, UTI (urinary tract infection), and Vaginal cuff dehiscence. Ms. Kaya Montalvo  has a past surgical history that includes pr endometrial cryoablation; hx other surgical; hx bilateral salpingectomy; hx other surgical (11/2016); hx total laparoscopic hysterectomy; hx breast augmentation (2003); hx gyn (2000, 2004); hx dilation and curettage; hx pelvic laparoscopy (11/03/2016); hx oophorectomy (Left); hx other surgical (10/30/2018); and hx other surgical (11/06/2018). Social History/Living Environment:    Has 3 children, works during the day at [employer]  Prior Level of Function/Work/Activity:  Has been having pain for many years. Previous Treatment Approaches:          Pelvic floor therapy. Ambulatory/Rehab Services H2 Model Falls Risk Assessment    Risk Factors:       No Risk Factors Identified Ability to Rise from Chair:       (0)  Ability to rise in a single movement    Falls Prevention Plan:       No modifications necessary   Total: (5 or greater = High Risk): 0    ©2010 [facility] [employer]. All Rights Reserved. [facility] States Patent #3,178,273.  Federal Law prohibits the replication, distribution or use without written permission from [employer]     Current Medications:    Current Outpatient Medications:     pregabalin (LYRICA) 75 mg capsule, Take 75 mg by mouth two (2) times a day., Disp: , Rfl:     sertraline (ZOLOFT) 100 mg tablet, Take 150 mg by mouth nightly., Disp: , Rfl:     diazePAM (VALIUM) 10 mg tablet, 10 mg. Vaginal suppository--uses every other night, Disp: , Rfl:     omega 3-DHA-EPA-fish oil 1,000 mg (120 mg-180 mg) capsule, Take 1 Cap by mouth., Disp: , Rfl:     melatonin 5 mg cap capsule, Take 10 mg by mouth nightly., Disp: , Rfl:     estradiol (ESTRACE) 1 mg tablet, TK 1 T PO QHS, Disp: , Rfl: 11    methen-sod phos-meth blue-hyos (UROGESIC-BLUE) 81.6-40.8-0.12 mg tab, May take 1-3 tabs per day as needed for bladder pain, Disp: 60 Tab, Rfl: 2    methocarbamol (ROBAXIN) 500 mg tablet, Take 500 mg by mouth two (2) times a day., Disp: , Rfl:     UBIDECARENONE/VITAMIN E MIXED (COQ10  PO), Take  by mouth nightly., Disp: , Rfl:     VIT B2/NIACIN/B6/B12/DEXPANTH (B COMPLEX SL), by SubLINGual route daily. , Disp: , Rfl:     traMADol (ULTRAM) 50 mg tablet, Take 1-2 Tabs by mouth every six (6) hours as needed for Pain. Max Daily Amount: 400 mg., Disp: 60 Tab, Rfl: 0    clonazePAM (KLONOPIN) 0.5 mg tablet, Take  by mouth nightly as needed. , Disp: , Rfl:     promethazine (PHENERGAN) 25 mg tablet, Take 25 mg by mouth every six (6) hours as needed for Nausea., Disp: , Rfl:     ibuprofen (MOTRIN) 800 mg tablet, Take 1 Tab by mouth every six (6) hours as needed. (Patient taking differently: Take 800 mg by mouth every six (6) hours as needed. Last dose 12/17/17), Disp: 30 Tab, Rfl: 0    magnesium oxide (MAG-OX) 400 mg tablet, Take 400 mg by mouth daily. , Disp: , Rfl:     loratadine (CLARITIN) 10 mg tablet, Take 10 mg by mouth every evening., Disp: , Rfl:     LACTOBACILLUS ACIDOPHILUS (PROBIOTIC PO), Take  by mouth., Disp: , Rfl:     multivitamin (ONE A DAY) tablet, Take 1 Tab by mouth daily.   , Disp: , Rfl:    Date Last Reviewed:  1/21/2019   Voiding Patterns:  Patient voids every 4 hours during the day and 1 times during the night. Patient reports that she does not empties bladder. Does not leak. Fluid Intake:  Patient drinks 1 gallon of water per day. She consumes 1 cups of caffeinated beverages per day. Patient not limit fluid intake to control bladder. Bowel Habits:  Patient demonstrates constipation. Probiotic, citricel, linzes, mirilax, smooth move. Bowel movements are soft  Personal / Social History:  · Sexually active? Yes, but very painful       Number of Personal Factors/Comorbidities that affect the Plan of Care: 3+: HIGH COMPLEXITY   EXAMINATION:   External Observation:     · Anal [place]: present  · Skin Integrity: WNL  · Q-tip Test: Painful  Palpation:     12/20/2018   Right Left   Bulbocavernosus Pain 4/10 Pain 4/10   Ischocavernosus     Superficial Transverse Perineal     Sphincter Uhrovaginal     Compressor Urethra      Obturator Internus Pain 2/10 Pain 4/10   Iliococcygeus Pain 4/10 Pain 4/10   Coccygeus     Pubococcygeus Pain 4/10 Pain 4/10   Levator Ani     Adductor tenderness Tenderness    Glut medius  Tenderness at medial insersion                        Body Structures Involved:  1. Muscles Body Functions Affected:  1. Genitourinary  2. Neuromusculoskeletal Activities and Participation Affected:  1. Mobility  2. Self Care  3. Interpersonal Interactions and Relationships   Number of elements that affect the Plan of Care: 4+: HIGH COMPLEXITY   CLINICAL PRESENTATION:   Presentation: Evolving clinical presentation with unstable and unpredictable characteristics: HIGH COMPLEXITY   CLINICAL DECISION MAKING:   Outcome Measure: Tool Used: Pelvic Floor Disability Index (PFDI-20)  Score:  Initial: 56 Most Recent: X (Date: -- )   Interpretation of Score: This survey asks questions concerning certain  bowel, bladder, or pelvic symptoms and how much this symptoms interfere with daily activiites. Each section is scored on a 0-4 scale, 4 representing the greatest disability.   The scores of each section are added together for a total score out of 70. Score 0 1-13 14-27 28-41 42-55 56-69 70   Modifier CH CI CJ CK CL CM CN     Medical Necessity:   · Patient is expected to demonstrate progress in coordination to decrease assistance required with pelvic floor drops. Reason for Services/Other Comments:  · Patient continues to require skilled intervention due to pelvic pain. Use of outcome tool(s) and clinical judgement create a POC that gives a: Difficult prediction of patient's progress: HIGH COMPLEXITY   TREATMENT:   888(In addition to Assessment/Re-Assessment sessions the following treatments were rendered)  Pre-treatment Symptoms/Complaints:  Pt helped her mom void. She is sore everywhere. Has been taking 3-4 Ultram a day. Feeling a little bit better. Pain: Initial:      4/10 Post Session:  4/10     THERAPEUTIC EXERCISE: (8 minutes):  Exercises per grid below to improve coordination. Required minimal visual and tactile cues to promote proper body breathing techniques. Progressed complexity of movement as indicated. MANUAL THERAPY: (45 minutes): Soft tissue mobilization was utilized and necessary because of the patient's painful spasm and restricted motion of soft tissue. (Used abbreviations: MET - muscle energy technique; SCS- Strain counter strain; CTM- Connective tissue mobilizations; CR- Contract/relax; SP- Sustained pressure, TrP- Trigger point release, IASTM- Instrument assisted soft tissue mobilizations, TDN- Trigger point dry needling). CTM to bilateral adductors with rolling and strumming. Nerve flossing to pudendal n. SCS and Trigger point to bilateral deep transverse perineum and superficial transverse, levator ani and oI  perineum to reduce tone and improve tissue elasticity. (5 min total.)  Edge and cupping  to adductors with pt verbal consent.      Exercises:  Patient instructed in pelvic floor exercises listed below:   Date:  1/21/2019    Activity/Exercise    PF drops and relaxation techniques  in supine with biofeedback assist for visual feedback via external anal sensors      Nu step    Left external rotation stretch 3 x 30 sec   Adductor stetch 3 x 30 sec    Pt education   5 min                    [facility]    The following educational topics were reviewed with patient: dilator instruction and goal setting  Treatment/Session Assessment:     · Response to Treatment:   Pt was able to tolerate 5 min of internal manual therapy at layer 1 and 2 of the pelvic floor. Still very exacerbated with minimal pressure. Instructed patient to begin dilator work at home every other day this week for 30 sec to 3 min. Will try dilators and biofeedback next visit. · Compliance with Program/Exercises: Will assess as treatment progresses. · Recommendations/Intent for next treatment session: \"Next visit will focus on advancements to more challenging activities\". Total Treatment Duration:    Total time 60 min.   5 min don / doff clothing    Yanet Martinez, PT  Future Appointments   Date Time Provider [place] Maier   1/21/2019  7:00 PM Ari Tay, PT [facility]   1/25/2019  1:00 PM [place] Tay, PT [facility]   1/30/2019  8:00 AM Ari Tay, PT [facility]   2/8/2019  1:00 PM [place] Tay, PT [facility]   2/15/2019 10:00 AM Ari Tay, PT [facility]   2/22/2019 10:00 AM Ari Tay, PT [facility]   3/1/2019 10:00 AM [place] Tay, PT [facility]   3/7/2019 11:00 AM Ari Tay, PT [facility]   3/15/2019 10:00 AM [place] Tay, PT [facility]   3/22/2019 10:00 AM [place] Tay, PT [facility]   3/29/2019 10:00 AM [place] Tay, PT [facility]

## 2019-01-25 ENCOUNTER — HOSPITAL ENCOUNTER (OUTPATIENT)
Dept: PHYSICAL THERAPY | Age: 34
Discharge: HOME | End: 2019-01-25
Payer: COMMERCIAL

## 2019-01-25 PROCEDURE — 97110 THERAPEUTIC EXERCISES: CPT

## 2019-01-25 PROCEDURE — 97140 MANUAL THERAPY 1/> REGIONS: CPT

## 2019-01-25 NOTE — PROGRESS NOTES
Link Grullon Bob  : 1985  Primary: Renee Bernard [facility]  Secondary:  Therapy Center at [address] [place] [address] 52, [address] [address], [address]  [phone]   [phone]          OUTPATIENT PHYSICAL THERAPY:Daily Note 2019    ICD-10: Treatment Diagnosis: Myalgia (M79.1)Fibromyalgia (M79.7)Other lack of coordination (R27.8)   Precautions/Allergies:   Codeine; Dermagraft [cult skin subst, human-bovine]; and Percocet [oxycodone-acetaminophen]   MD Orders: Eval and treat MEDICAL/REFERRING DIAGNOSIS:  Pelvic and perineal pain [R10.2]   DATE OF ONSET: Chronic  REFERRING PHYSICIAN: López Camacho, *  RETURN PHYSICIAN APPOINTMENT: TBD     INITIAL ASSESSMENT:  Ms. Bienvenido Williamson returns to therapy after having surgery with Dr. Miguelito Navarrete and botox with Dr. Nikhil Rizzo. She is now 3 weeks post op her endometrial surgery. She had previous Botox prior to surgery in the pelvic floor mm. . Her surgical and therapy history is very long and complex. She has had multiple surgery's and multiple encoutners with pelvic floor rehabilitation. Today in the clinic, I was only able to palpate at the introitus and the first layer of pelvic floor mm. Pt reported significant pain with palpation at the vaginal opening, labia majora, and abdomen. Therefore, I suggested we wait two weeks until her next visit. She was agreeable with POC. Pt is an excellent candidate for pelvic floor therapy due to her limitations with sitting, standing, penetrative intercourse, PMH, and significant level of pain. Pt would benefit from skilled PT. PROBLEM LIST (Impacting functional limitations):  1. Decreased Strength  2. Decreased ADL/Functional Activities  3. Increased Pain  4. Decreased Flexibility/Joint Mobility  5. Decreased Glynn with Home Exercise Program INTERVENTIONS PLANNED:  1. Neuromuscular re-education  2. Biofeedback as needed  3. HEP  4. Bladder retraining  5. Bladder education  6.  Electrical Stimulation  7. Home Exercise Program (HEP)  8. Manual Therapy  9. Range of Motion (ROM)  10. Therapeutic Exercise/Strengthening  11. Ultrasound (US)  12. low level light therapy   TREATMENT PLAN:  Effective Dates: 11/30/2018 TO 2/28/2019 (90 days). Frequency/Duration: 2 times a week for 90 Days  GOALS: (Goals have been discussed and agreed upon with patient.)  Short-Term Functional Goals: Time Frame: in 4 weeks  1. Patient will demonstrate independence with home exercise program.  Discharge Goals: Time Frame: TBD  Rehabilitation Potential For Stated Goals: Fair  Regarding Dayana Rojas's therapy, I certify that the treatment plan above will be carried out by a therapist or under their direction. Thank you for this referral,  Jv Huynh, PT     Referring Physician Signature: Umu Montalvo, *              Date                    The information in this section was collected on 1/25/2019  (except where otherwise noted). HISTORY:   History of Present Injury/Illness (Reason for Referral):  Dx at 13 with endometriosis. That was when she had her first laprascropy. Has had 4 laparoscopy. First vaginal delivery 2009. Second child was born 2010. Had a vaginal delivery. Did do an ablation  which lasted for about one year. 2014 had a hysterectomy. Got sick after surgery. 8 weeks later had a vaginal dehiscence. Was septic then. Did not rebuild the cuff. The endo starts to attach the vaginal cuff. Started seeing Annie Paige for pelvic floor therapy. Parish Benavides is having difficulty with palpation. Nov 2016 had a excision surgery for endometriosis. They build up the cuff. They tacked the left ovary. Eventually took out the left ovary in 2017 and found endo in the pelvis. In 2017, was dx with fibro. Jovon Begun tried to ablation of the ilioinguinal nerve. Had necrosis around the hip bone. Recently had Botox with . Dr Harmony Gary had a cytoscopy and the bladder was healthy.   Went to see Dr. Luis Miguel Cisse to redo surgery in the pelvis for possible endo. Deadend nerve and it feels better. Vaginal valium in the past was helpful. Needling does help pain, soft tissue, and deep tissues. Unable to stand for any period of time. Does better with movement. Had tailbone pain for a while and does use cushions. Feels burring at the [facility]. Past Medical History/Comorbidities:   Ms. Gildardo Romero  has a past medical history of Anxiety (9/6/2013), Bladder pain, Chronic pain, Endometriosis, Gluten intolerance, High-tone pelvic floor dysfunction, IBS (irritable bowel syndrome), Migraine, Nausea & vomiting, Ovarian cyst, PUD (peptic ulcer disease) (12/2013 ), Unspecified adverse effect of anesthesia, UTI (urinary tract infection), and Vaginal cuff dehiscence. Ms. Gildardo Romero  has a past surgical history that includes pr endometrial cryoablation; hx other surgical; hx bilateral salpingectomy; hx other surgical (11/2016); hx total laparoscopic hysterectomy; hx breast augmentation (2003); hx gyn (2000, 2004); hx dilation and curettage; hx pelvic laparoscopy (11/03/2016); hx oophorectomy (Left); hx other surgical (10/30/2018); and hx other surgical (11/06/2018). Social History/Living Environment:    Has 3 children, works during the day at "[employer]  Prior Level of Function/Work/Activity:  Has been having pain for many years. Previous Treatment Approaches:          Pelvic floor therapy. Ambulatory/Rehab Services H2 Model Falls Risk Assessment    Risk Factors:       No Risk Factors Identified Ability to Rise from Chair:       (0)  Ability to rise in a single movement    Falls Prevention Plan:       No modifications necessary   Total: (5 or greater = High Risk): 0    ©2010 [facility] [email or web address]. All Rights Reserved. [facility] Patent #[id number].  Federal Law prohibits the replication, distribution or use without written permission from [employer]     Current Medications:    Current Outpatient Medications:     pregabalin (LYRICA) 75 mg capsule, Take 75 mg by mouth two (2) times a day., Disp: , Rfl:     sertraline (ZOLOFT) 100 mg tablet, Take 150 mg by mouth nightly., Disp: , Rfl:     diazePAM (VALIUM) 10 mg tablet, 10 mg. Vaginal suppository--uses every other night, Disp: , Rfl:     omega 3-DHA-EPA-fish oil 1,000 mg (120 mg-180 mg) capsule, Take 1 Cap by mouth., Disp: , Rfl:     melatonin 5 mg cap capsule, Take 10 mg by mouth nightly., Disp: , Rfl:     estradiol (ESTRACE) 1 mg tablet, TK 1 T PO QHS, Disp: , Rfl: 11    methen-sod phos-meth blue-hyos (UROGESIC-BLUE) 81.6-40.8-0.12 mg tab, May take 1-3 tabs per day as needed for bladder pain, Disp: 60 Tab, Rfl: 2    methocarbamol (ROBAXIN) 500 mg tablet, Take 500 mg by mouth two (2) times a day., Disp: , Rfl:     UBIDECARENONE/VITAMIN E MIXED (COQ10  PO), Take  by mouth nightly., Disp: , Rfl:     VIT B2/NIACIN/B6/B12/DEXPANTH (B COMPLEX SL), by SubLINGual route daily. , Disp: , Rfl:     traMADol (ULTRAM) 50 mg tablet, Take 1-2 Tabs by mouth every six (6) hours as needed for Pain. Max Daily Amount: 400 mg., Disp: 60 Tab, Rfl: 0    clonazePAM (KLONOPIN) 0.5 mg tablet, Take  by mouth nightly as needed. , Disp: , Rfl:     promethazine (PHENERGAN) 25 mg tablet, Take 25 mg by mouth every six (6) hours as needed for Nausea., Disp: , Rfl:     ibuprofen (MOTRIN) 800 mg tablet, Take 1 Tab by mouth every six (6) hours as needed. (Patient taking differently: Take 800 mg by mouth every six (6) hours as needed. Last dose 12/17/17), Disp: 30 Tab, Rfl: 0    magnesium oxide (MAG-OX) 400 mg tablet, Take 400 mg by mouth daily. , Disp: , Rfl:     loratadine (CLARITIN) 10 mg tablet, Take 10 mg by mouth every evening., Disp: , Rfl:     LACTOBACILLUS ACIDOPHILUS (PROBIOTIC PO), Take  by mouth., Disp: , Rfl:     multivitamin (ONE A DAY) tablet, Take 1 Tab by mouth daily.   , Disp: , Rfl:    Date Last Reviewed:  1/25/2019   Voiding Patterns:  Patient voids every 4 hours during the day and 1 times during the night. Patient reports that she does not empties bladder. Does not leak. Fluid Intake:  Patient drinks 1 gallon of water per day. She consumes 1 cups of caffeinated beverages per day. Patient not limit fluid intake to control bladder. Bowel Habits:  Patient demonstrates constipation. Probiotic, citricel, linzes, mirilax, smooth move. Bowel movements are soft  Personal / Social History:  · Sexually active? Yes, but very painful       Number of Personal Factors/Comorbidities that affect the Plan of Care: 3+: HIGH COMPLEXITY   EXAMINATION:   External Observation:     · Anal Dante: present  · Skin Integrity: WNL  · Q-tip Test: Painful  Palpation:     12/20/2018   Right Left   Bulbocavernosus Pain 4/10 Pain 4/10   Ischocavernosus     Superficial Transverse Perineal     Sphincter Uhrovaginal     Compressor Urethra      Obturator Internus Pain 2/10 Pain 4/10   Iliococcygeus Pain 4/10 Pain 4/10   Coccygeus     Pubococcygeus Pain 4/10 Pain 4/10   Levator Ani     Adductor tenderness Tenderness    Glut medius  Tenderness at medial insersion                   SEMG evaluation:   Date: 1/28/2019    Resting Tone: starting at 5 uV and ending after PT 0.5   Quality of Resting Tone: good after manual therapy   5 Second Hold: 5 uV   10 Second Hold: NT uV   Quality of Recruitment: Good    Quality of Relaxation: Good    Quality of Holding: Fair    Stability of Hold: Fair    Stability of Rest: good after manual      Body Structures Involved:  1. Muscles Body Functions Affected:  1. Genitourinary  2. Neuromusculoskeletal Activities and Participation Affected:  1. Mobility  2. Self Care  3. Interpersonal Interactions and Relationships   Number of elements that affect the Plan of Care: 4+: HIGH COMPLEXITY   CLINICAL PRESENTATION:   Presentation: Evolving clinical presentation with unstable and unpredictable characteristics: HIGH COMPLEXITY   CLINICAL DECISION MAKING:   Outcome Measure:    Tool Used: Pelvic Floor Disability Index (PFDI-20)  Score:  Initial: 56 Most Recent: X (Date: -- )   Interpretation of Score: This survey asks questions concerning certain  bowel, bladder, or pelvic symptoms and how much this symptoms interfere with daily activiites. Each section is scored on a 0-4 scale, 4 representing the greatest disability. The scores of each section are added together for a total score out of 70. Score 0 1-13 14-27 28-41 42-55 56-69 70   Modifier CH CI CJ CK CL CM CN     Medical Necessity:   · Patient is expected to demonstrate progress in coordination to decrease assistance required with pelvic floor drops. Reason for Services/Other Comments:  · Patient continues to require skilled intervention due to pelvic pain. Use of outcome tool(s) and clinical judgement create a POC that gives a: Difficult prediction of patient's progress: HIGH COMPLEXITY   TREATMENT:   888(In addition to Assessment/Re-Assessment sessions the following treatments were rendered)  Pre-treatment Symptoms/Complaints: Pt states that felt a little sore for about 3 hours after therapy last time. Pain: Initial:   Pain Intensity 1: 4  Pain Location 1: Pelvis  4/10 Post Session:  4/10     THERAPEUTIC EXERCISE: (15 minutes):  Exercises per grid below to improve coordination. Required minimal visual and tactile cues to promote proper body breathing techniques. Progressed complexity of movement as indicated. MANUAL THERAPY: (40 minutes): Soft tissue mobilization was utilized and necessary because of the patient's painful spasm and restricted motion of soft tissue. (Used abbreviations: MET - muscle energy technique; SCS- Strain counter strain; CTM- Connective tissue mobilizations; CR- Contract/relax; SP- Sustained pressure, TrP- Trigger point release, IASTM- Instrument assisted soft tissue mobilizations, TDN- Trigger point dry needling). CTM to bilateral adductors with rolling and strumming. Nerve flossing to pudendal n.    SCS and Trigger point to bilateral deep transverse perineum and superficial transverse, levator ani and oI  perineum to reduce tone and improve tissue elasticity. (5 min total.)  Edge and cupping  to adductors with pt verbal consent. Exercises:  Patient instructed in pelvic floor exercises listed below:   Date:  1/25/2019    Activity/Exercise    PF drops and relaxation techniques  in supine with biofeedback assist for visual feedback via external anal sensors   15 min total.    Nu step    Left external rotation stretch 3 x 30 sec   Adductor stetch 3 x 30 sec    Pt education                      [employer]    The following educational topics were reviewed with patient: dilator instruction and goal setting  Treatment/Session Assessment:     · Response to Treatment:   Pt was able to tolerate 6 min of internal manual therapy at layer 1 and 2 of the pelvic floor. Pt had improved tolerance. Was able to have excellent resting tone after manual.   · Compliance with Program/Exercises: Will assess as treatment progresses. · Recommendations/Intent for next treatment session: \"Next visit will focus on advancements to more challenging activities\".   Total Treatment Duration:  PT Patient Time In/Time Out  Time In: 1100  Time Out: [address] PT  Future Appointments   Date Time Provider Jose Cruz Maier   1/30/2019  8:00 AM [place] Mar, PT [facility]   2/8/2019  1:00 PM [place] Mar, PT [facility]   2/15/2019 10:00 AM [place] Mar, PT [facility]   2/22/2019 10:00 AM [place] Mar, PT [facility]   3/1/2019 10:00 AM [place] Mar, PT [facility]   3/7/2019 11:00 AM [place] Mar, PT [facility]   3/15/2019 10:00 AM [place] Mar, PT [facility]   3/22/2019 10:00 AM [place] Mar, PT [facility]   3/29/2019 10:00 AM [place] Mar, PT [facility]   4/4/2019 10:00 AM [place] Mar, PT [facility]   4/11/2019 10:00 AM [place] Mar, PT [facility] 4/25/2019  1:00 PM Jeni Jackson, PT [facility]

## 2019-01-30 ENCOUNTER — HOSPITAL ENCOUNTER (OUTPATIENT)
Dept: PHYSICAL THERAPY | Age: 34
Discharge: HOME | End: 2019-01-30
Payer: COMMERCIAL

## 2019-01-30 PROCEDURE — 97140 MANUAL THERAPY 1/> REGIONS: CPT

## 2019-01-30 NOTE — PROGRESS NOTES
Brenton Rojas  : 1985  Primary: Paulo Mckenzie  Secondary:  [facility] [place] [id number], [address] [place] [address],[address], [address]  [phone]   [phone]          OUTPATIENT PHYSICAL THERAPY:Daily Note 2019    ICD-10: Treatment Diagnosis: Myalgia (M79.1)Fibromyalgia (M79.7)Other lack of coordination (R27.8)   Precautions/Allergies:   Codeine; Dermagraft [cult skin subst, human-bovine]; and Percocet [oxycodone-acetaminophen]   MD Orders: Eval and treat MEDICAL/REFERRING DIAGNOSIS:  Pelvic and perineal pain [R10.2]   DATE OF ONSET: Chronic  REFERRING PHYSICIAN: Barak Butler, *  RETURN PHYSICIAN APPOINTMENT: TBD     INITIAL ASSESSMENT:  Ms. Rosy Arredondo returns to therapy after having surgery with Dr. Dexter Smith and botox with Dr. Flower Bishop. She is now 3 weeks post op her endometrial surgery. She had previous Botox prior to surgery in the pelvic floor mm. . Her surgical and therapy history is very long and complex. She has had multiple surgery's and multiple encoutners with pelvic floor rehabilitation. Today in the clinic, I was only able to palpate at the introitus and the first layer of pelvic floor mm. Pt reported significant pain with palpation at the vaginal opening, labia majora, and abdomen. Therefore, I suggested we wait two weeks until her next visit. She was agreeable with POC. Pt is an excellent candidate for pelvic floor therapy due to her limitations with sitting, standing, penetrative intercourse, PMH, and significant level of pain. Pt would benefit from skilled PT. PROBLEM LIST (Impacting functional limitations):  1. Decreased Strength  2. Decreased ADL/Functional Activities  3. Increased Pain  4. Decreased Flexibility/Joint Mobility  5. Decreased [place] with Home Exercise Program INTERVENTIONS PLANNED:  1. Neuromuscular re-education  2. Biofeedback as needed  3. HEP  4. Bladder retraining  5. Bladder education  6.  Electrical Stimulation  7. Home Exercise Program (HEP)  8. Manual Therapy  9. Range of Motion (ROM)  10. Therapeutic Exercise/Strengthening  11. Ultrasound (US)  12. low level light therapy   TREATMENT PLAN:  Effective Dates: 11/30/2018 TO 2/28/2019 (90 days). Frequency/Duration: 2 times a week for 90 Days  GOALS: (Goals have been discussed and agreed upon with patient.)  Short-Term Functional Goals: Time Frame: in 4 weeks  1. Patient will demonstrate independence with home exercise program.  Discharge Goals: Time Frame: TBD  Rehabilitation Potential For Stated Goals: Fair  Regarding Dayana Rojas's therapy, I certify that the treatment plan above will be carried out by a therapist or under their direction. Thank you for this referral,  Gemma Peabody, PT     Referring Physician Signature: Janna River, *              Date                    The information in this section was collected on 1/30/2019  (except where otherwise noted). HISTORY:   History of Present Injury/Illness (Reason for Referral):  Dx at 13 with endometriosis. That was when she had her first laprascropy. Has had 4 laparoscopy. First vaginal delivery 2009. Second child was born 2010. Had a vaginal delivery. Did do an ablation  which lasted for about one year. 2014 had a hysterectomy. Got sick after surgery. 8 weeks later had a vaginal dehiscence. Was septic then. Did not rebuild the cuff. The endo starts to attach the vaginal cuff. Started seeing [facility] for pelvic floor therapy. Iban Ford is having difficulty with palpation. Nov 2016 had a excision surgery for endometriosis. They build up the cuff. They tacked the left ovary. Eventually took out the left ovary in 2017 and found endo in the pelvis. In 2017, was dx with fibro. aJne Cerna tried to ablation of the ilioinguinal nerve. Had necrosis around the hip bone. Recently had Botox with . Dr Desmond Dsouza had a cytoscopy and the bladder was healthy.   Went to see Dr. Gia Lima to redo surgery in the pelvis for possible endo. Deadend nerve and it feels better. Vaginal valium in the past was helpful. Needling does help pain, soft tissue, and deep tissues. Unable to stand for any period of time. Does better with movement. Had tailbone pain for a while and does use cushions. Feels burring at the [facility]. Past Medical History/Comorbidities:   Ms. Jase Larry  has a past medical history of Anxiety (9/6/2013), Bladder pain, Chronic pain, Endometriosis, Gluten intolerance, High-tone pelvic floor dysfunction, IBS (irritable bowel syndrome), Migraine, Nausea & vomiting, Ovarian cyst, PUD (peptic ulcer disease) (12/2013 ), Unspecified adverse effect of anesthesia, UTI (urinary tract infection), and Vaginal cuff dehiscence. Ms. Jase Larry  has a past surgical history that includes pr endometrial cryoablation; hx other surgical; hx bilateral salpingectomy; hx other surgical (11/2016); hx total laparoscopic hysterectomy; hx breast augmentation (2003); hx gyn (2000, 2004); hx dilation and curettage; hx pelvic laparoscopy (11/03/2016); hx oophorectomy (Left); hx other surgical (10/30/2018); and hx other surgical (11/06/2018). Social History/Living Environment:    Has 3 children, works during the day at [employer]  Prior Level of Function/Work/Activity:  Has been having pain for many years. Previous Treatment Approaches:          Pelvic floor therapy. Ambulatory/Rehab Services H2 Model Falls Risk Assessment    Risk Factors:       No Risk Factors Identified Ability to Rise from Chair:       (0)  Ability to rise in a single movement    Falls Prevention Plan:       No modifications necessary   Total: (5 or greater = High Risk): 0    ©2010 [facility]. All Rights Reserved. [facility] Patent #[id number].  Federal Law prohibits the replication, distribution or use without written permission from [email or web address]     Current Medications:    Current Outpatient Medications:     pregabalin (LYRICA) 75 mg capsule, Take 75 mg by mouth two (2) times a day., Disp: , Rfl:     sertraline (ZOLOFT) 100 mg tablet, Take 150 mg by mouth nightly., Disp: , Rfl:     diazePAM (VALIUM) 10 mg tablet, 10 mg. Vaginal suppository--uses every other night, Disp: , Rfl:     omega 3-DHA-EPA-fish oil 1,000 mg (120 mg-180 mg) capsule, Take 1 Cap by mouth., Disp: , Rfl:     melatonin 5 mg cap capsule, Take 10 mg by mouth nightly., Disp: , Rfl:     estradiol (ESTRACE) 1 mg tablet, TK 1 T PO QHS, Disp: , Rfl: 11    methen-sod phos-meth blue-hyos (UROGESIC-BLUE) 81.6-40.8-0.12 mg tab, May take 1-3 tabs per day as needed for bladder pain, Disp: 60 Tab, Rfl: 2    methocarbamol (ROBAXIN) 500 mg tablet, Take 500 mg by mouth two (2) times a day., Disp: , Rfl:     UBIDECARENONE/VITAMIN E MIXED (COQ10  PO), Take  by mouth nightly., Disp: , Rfl:     VIT B2/NIACIN/B6/B12/DEXPANTH (B COMPLEX SL), by SubLINGual route daily. , Disp: , Rfl:     traMADol (ULTRAM) 50 mg tablet, Take 1-2 Tabs by mouth every six (6) hours as needed for Pain. Max Daily Amount: 400 mg., Disp: 60 Tab, Rfl: 0    clonazePAM (KLONOPIN) 0.5 mg tablet, Take  by mouth nightly as needed. , Disp: , Rfl:     promethazine (PHENERGAN) 25 mg tablet, Take 25 mg by mouth every six (6) hours as needed for Nausea., Disp: , Rfl:     ibuprofen (MOTRIN) 800 mg tablet, Take 1 Tab by mouth every six (6) hours as needed. (Patient taking differently: Take 800 mg by mouth every six (6) hours as needed. Last dose 12/17/17), Disp: 30 Tab, Rfl: 0    magnesium oxide (MAG-OX) 400 mg tablet, Take 400 mg by mouth daily. , Disp: , Rfl:     loratadine (CLARITIN) 10 mg tablet, Take 10 mg by mouth every evening., Disp: , Rfl:     LACTOBACILLUS ACIDOPHILUS (PROBIOTIC PO), Take  by mouth., Disp: , Rfl:     multivitamin (ONE A DAY) tablet, Take 1 Tab by mouth daily.   , Disp: , Rfl:    Date Last Reviewed:  1/30/2019   Voiding Patterns:  Patient voids every 4 hours during the day and 1 times during the night. Patient reports that she does not empties bladder. Does not leak. Fluid Intake:  Patient drinks 1 gallon of water per day. She consumes 1 cups of caffeinated beverages per day. Patient not limit fluid intake to control bladder. Bowel Habits:  Patient demonstrates constipation. Probiotic, citricel, linzes, mirilax, smooth move. Bowel movements are soft  Personal / Social History:  · Sexually active? Yes, but very painful       Number of Personal Factors/Comorbidities that affect the Plan of Care: 3+: HIGH COMPLEXITY   EXAMINATION:   External Observation:     · Anal [place]: present  · Skin Integrity: WNL  · Q-tip Test: Painful  Palpation:     12/20/2018   Right Left   Bulbocavernosus Pain 4/10 Pain 4/10   Ischocavernosus     Superficial Transverse Perineal     Sphincter Uhrovaginal     Compressor Urethra      Obturator Internus Pain 2/10 Pain 4/10   Iliococcygeus Pain 4/10 Pain 4/10   Coccygeus     Pubococcygeus Pain 4/10 Pain 4/10   Levator Ani     Adductor tenderness Tenderness    Glut medius  Tenderness at medial insersion                   SEMG evaluation:   Date: 1/28/2019    Resting Tone: starting at 5 uV and ending after PT 0.5   Quality of Resting Tone: good after manual therapy   5 Second Hold: 5 uV   10 Second Hold: NT uV   Quality of Recruitment: Good    Quality of Relaxation: Good    Quality of Holding: Fair    Stability of Hold: Fair    Stability of Rest: good after manual      Body Structures Involved:  1. Muscles Body Functions Affected:  1. Genitourinary  2. Neuromusculoskeletal Activities and Participation Affected:  1. Mobility  2. Self Care  3. Interpersonal Interactions and Relationships   Number of elements that affect the Plan of Care: 4+: HIGH COMPLEXITY   CLINICAL PRESENTATION:   Presentation: Evolving clinical presentation with unstable and unpredictable characteristics: HIGH COMPLEXITY   CLINICAL DECISION MAKING:   Outcome Measure:    Tool Used: Pelvic Floor Disability Index (PFDI-20)  Score:  Initial: 56 Most Recent: X (Date: -- )   Interpretation of Score: This survey asks questions concerning certain  bowel, bladder, or pelvic symptoms and how much this symptoms interfere with daily activiites. Each section is scored on a 0-4 scale, 4 representing the greatest disability. The scores of each section are added together for a total score out of 70. Score 0 1-13 14-27 28-41 42-55 56-69 70   Modifier CH CI CJ CK CL CM CN     Medical Necessity:   · Patient is expected to demonstrate progress in coordination to decrease assistance required with pelvic floor drops. Reason for Services/Other Comments:  · Patient continues to require skilled intervention due to pelvic pain. Use of outcome tool(s) and clinical judgement create a POC that gives a: Difficult prediction of patient's progress: HIGH COMPLEXITY   TREATMENT:   888(In addition to Assessment/Re-Assessment sessions the following treatments were rendered)    Pre-treatment Symptoms/Complaints: Pt states that she had intercourse for about 15 minutes on Monday and she has been sore since then. She was unable to work yesterday. She state that she did have vaginal penetration, but it was only at the top of the penis that was able to be inserted. She states that she still has a lot of soreness in the vagina and around the openining  Pain: Initial:   Pain Intensity 1: 6  Pain Location 1: Pelvis   Post Session:  5/10     THERAPEUTIC EXERCISE: (0 minutes):  Exercises per grid below to improve coordination. Required minimal visual and tactile cues to promote proper body breathing techniques. Progressed complexity of movement as indicated. MANUAL THERAPY: (40 minutes): Soft tissue mobilization was utilized and necessary because of the patient's painful spasm and restricted motion of soft tissue.      (Used abbreviations: MET - muscle energy technique; SCS- Strain counter strain; CTM- Connective tissue mobilizations; CR- Contract/relax; SP- Sustained pressure, TrP- Trigger point release, IASTM- Instrument assisted soft tissue mobilizations, TDN- Trigger point dry needling). CTM to bilateral adductors with rolling and strumming. Nerve flossing to pudendal n. SCS and Trigger point to bilateral deep transverse perineum and superficial transverse, levator ani and oI  perineum to reduce tone and improve tissue elasticity. (not performed)  Edge and cupping  to adductors with pt verbal consent. Exercises:  Patient instructed in pelvic floor exercises listed below:   Date:  1/30/2019    Activity/Exercise    PF drops and relaxation techniques  in supine with biofeedback assist for visual feedback via external anal sensors      Nu step    Left external rotation stretch    Adductor stetch    Pt education               [facility]    The following educational topics were reviewed with patient: dilator instruction and goal setting  Treatment/Session Assessment:     · Response to Treatment:   Pt did not receive internal soft tissue today due to increased pelvic pain. I did advise her to use her dilators in a few days to keep the vaginal opening mobile. Dorian be seeing Dr. Baron Underwood Friday. · Compliance with Program/Exercises: Will assess as treatment progresses. · Recommendations/Intent for next treatment session: \"Next visit will focus on advancements to more challenging activities\".   Total Treatment Duration:  PT Patient Time In/Time Out  Time In: 0820  Time Out: 0900     Pietro Record, PT  Future Appointments   Date Time Provider Jose Cruz Maier   2/8/2019  1:00 PM Sheldon Form, PT [facility]   2/15/2019 10:00 AM Sheldon Form, PT [facility]   2/22/2019 10:00 AM Sheldon Form, PT [facility]   3/1/2019 10:00 AM Sheldon Form, PT [facility]   3/7/2019 11:00 AM Sheldon Form, PT [facility]   3/15/2019 10:00 AM Sheldon Form, PT [facility]   3/22/2019 10:00 AM Sheldon Form, PT [facility]   3/29/2019 10:00 AM Daneil Stalling, PT [facility]   4/4/2019 10:00 AM Daneil Stalling, PT [facility]   4/11/2019 10:00 AM Daneil Stalling, PT [facility]   4/25/2019  1:00 PM Daneil Stalling, PT [facility]

## 2019-02-08 ENCOUNTER — HOSPITAL ENCOUNTER (OUTPATIENT)
Dept: PHYSICAL THERAPY | Age: 34
Discharge: HOME | End: 2019-02-08
Payer: COMMERCIAL

## 2019-02-08 PROCEDURE — 97110 THERAPEUTIC EXERCISES: CPT

## 2019-02-08 PROCEDURE — 97140 MANUAL THERAPY 1/> REGIONS: CPT

## 2019-02-08 NOTE — THERAPY RECERTIFICATION
Peggy Rojas  : 1985  Primary: Ben Mckenzie  Secondary:  [id number] at [facility]  [address] [facility], [address] [place] [address],[address], [address] [facility]  [phone]   [phone]          OUTPATIENT PHYSICAL THERAPY:Daily Note and Recertification 955    ICD-10: Treatment Diagnosis: Myalgia (M79.1)Fibromyalgia (M79.7)Other lack of coordination (R27.8)   Precautions/Allergies:   Codeine; Dermagraft [cult skin subst, human-bovine]; and Percocet [oxycodone-acetaminophen]   MD Orders: Eval and treat MEDICAL/REFERRING DIAGNOSIS:  Pelvic and perineal pain [R10.2]   DATE OF ONSET: Chronic  REFERRING PHYSICIAN: Payton Ramos, *  RETURN PHYSICIAN APPOINTMENT: TBD       Re-Evaluation  2019  Ms. Rojas has been to therapy for 10 visits one time per week. She has made minimal gains with PT. She presents with good resting tone with biofeedback. She is only able to tolerate insertion of 1 digit into the pelvic floor for 5 min. After her visit, she reports discomfort for 3-4 hours. She has been able to tolerate active gentle exercises such as the Nu-step. She continues to have increased pain with palpation throughout the superficial and deep layers of the pelvic floor left greater than right. She did have penetrative intercourse with her  a few weeks ago resulting in pain for 2 days. She has many barriers for rehab which include numerous surgeries for endometriosis and previous attempts with PT. I expect slow and steady progress. She is motivated with PT. Due to her deficits she continues to be a good candidate for PT. INITIAL ASSESSMENT:  Ms. Pauline De Anda returns to therapy after having surgery with Dr. Brittney Huynh and botox with Dr. Don Burr. She is now 3 weeks post op her endometrial surgery. She had previous Botox prior to surgery in the pelvic floor mm. . Her surgical and therapy history is very long and complex.   She has had multiple surgery's and multiple encoutners with pelvic floor rehabilitation. Today in the clinic, I was only able to palpate at the introitus and the first layer of pelvic floor mm. Pt reported significant pain with palpation at the vaginal opening, labia majora, and abdomen. Therefore, I suggested we wait two weeks until her next visit. She was agreeable with POC. Pt is an excellent candidate for pelvic floor therapy due to her limitations with sitting, standing, penetrative intercourse, PMH, and significant level of pain. Pt would benefit from skilled PT. PROBLEM LIST (Impacting functional limitations):  1. Decreased Strength  2. Decreased ADL/Functional Activities  3. Increased Pain  4. Decreased Flexibility/Joint Mobility  5. Decreased [place] with Home Exercise Program INTERVENTIONS PLANNED:  1. Neuromuscular re-education  2. Biofeedback as needed  3. HEP  4. Bladder retraining  5. Bladder education  6. Electrical Stimulation  7. Home Exercise Program (HEP)  8. Manual Therapy  9. Range of Motion (ROM)  10. Therapeutic Exercise/Strengthening  11. Ultrasound (US)  12. low level light therapy   TREATMENT PLAN:  Effective Dates:2/8/2019 to 5/6/2019 (90 days). Frequency/Duration: 2 times a week for 90 Days  GOALS: (Goals have been discussed and agreed upon with patient.)  Short-Term Functional Goals: Time Frame: in 4 weeks  1. Patient will demonstrate independence with home exercise program.  Discharge Goals: Time Frame:in 12 weeks  1. Pt able to tolerate size 2 Amielle dilator for 8 min in order to tolerate vaginal penetration. 2.   Pt report pain with palpation on the left side of the pelivc floor less than 2/10. 3.  Pt will tolerate 30 min of aerobic activity without increased pelvic pain. Rehabilitation Potential For Stated Goals: Fair  Regarding Isma Uday Rojas's therapy, I certify that the treatment plan above will be carried out by a therapist or under their direction.   Thank you for this referral,  Reji Aguiar, PT     Referring Physician Signature: Milvia Hanson, *              Date                    The information in this section was collected on 2/12/2019  (except where otherwise noted). HISTORY:   History of Present Injury/Illness (Reason for Referral):  Dx at 13 with endometriosis. That was when she had her first laprascropy. Has had 4 laparoscopy. First vaginal delivery 2009. Second child was born 2010. Had a vaginal delivery. Did do an ablation  which lasted for about one year. 2014 had a hysterectomy. Got sick after surgery. 8 weeks later had a vaginal dehiscence. Was septic then. Did not rebuild the cuff. The endo starts to attach the vaginal cuff. Started seeing Abiel Harris for pelvic floor therapy. Shweta Dawson is having difficulty with palpation. Nov 2016 had a excision surgery for endometriosis. They build up the cuff. They tacked the left ovary. Eventually took out the left ovary in 2017 and found endo in the pelvis. In 2017, was dx with fibro. Kayode Sol tried to ablation of the ilioinguinal nerve. Had necrosis around the hip bone. Recently had Botox with . Dr Agustin Galicia had a cytoscopy and the bladder was healthy. Went to see Dr. Walter Monday to  redo surgery in the pelvis for possible endo. Deadend nerve and it feels better. Vaginal valium in the past was helpful. Needling does help pain, soft tissue, and deep tissues. Unable to stand for any period of time. Does better with movement. Had tailbone pain for a while and does use cushions. Feels burring at the [facility]. Past Medical History/Comorbidities:   Ms. Kar Crowell  has a past medical history of Anxiety (9/6/2013), Bladder pain, Chronic pain, Endometriosis, Gluten intolerance, High-tone pelvic floor dysfunction, IBS (irritable bowel syndrome), Migraine, Nausea & vomiting, Ovarian cyst, PUD (peptic ulcer disease) (12/2013 ), Unspecified adverse effect of anesthesia, UTI (urinary tract infection), and Vaginal cuff dehiscence.   Ms. Kar Crowell  has a past surgical history that includes pr endometrial cryoablation; hx other surgical; hx bilateral salpingectomy; hx other surgical (11/2016); hx total laparoscopic hysterectomy; hx breast augmentation (2003); hx gyn (2000, 2004); hx dilation and curettage; hx pelvic laparoscopy (11/03/2016); hx oophorectomy (Left); hx other surgical (10/30/2018); and hx other surgical (11/06/2018). Social History/Living Environment:    Has 3 children, works during the day at [employer]  Prior Level of Function/Work/Activity:  Has been having pain for many years. Previous Treatment Approaches:          Pelvic floor therapy. Ambulatory/Rehab Services H2 Model Falls Risk Assessment    Risk Factors:       No Risk Factors Identified Ability to Rise from Chair:       (0)  Ability to rise in a single movement    Falls Prevention Plan:       No modifications necessary   Total: (5 or greater = High Risk): 0    ©2010 [facility] [employer]. All Rights Reserved. [facility] Patent #[id number]. Federal Law prohibits the replication, distribution or use without written permission from [facility] [email or web address]     Current Medications:    Current Outpatient Medications:     pregabalin (LYRICA) 75 mg capsule, Take 75 mg by mouth two (2) times a day., Disp: , Rfl:     sertraline (ZOLOFT) 100 mg tablet, Take 150 mg by mouth nightly., Disp: , Rfl:     diazePAM (VALIUM) 10 mg tablet, 10 mg.  Vaginal suppository--uses every other night, Disp: , Rfl:     omega 3-DHA-EPA-fish oil 1,000 mg (120 mg-180 mg) capsule, Take 1 Cap by mouth., Disp: , Rfl:     melatonin 5 mg cap capsule, Take 10 mg by mouth nightly., Disp: , Rfl:     estradiol (ESTRACE) 1 mg tablet, TK 1 T PO QHS, Disp: , Rfl: 11    methen-sod phos-meth blue-hyos (UROGESIC-BLUE) 81.6-40.8-0.12 mg tab, May take 1-3 tabs per day as needed for bladder pain, Disp: 60 Tab, Rfl: 2    methocarbamol (ROBAXIN) 500 mg tablet, Take 500 mg by mouth two (2) times a day., Disp: , Rfl:    UBIDECARENONE/VITAMIN E MIXED (COQ10  PO), Take  by mouth nightly., Disp: , Rfl:     VIT B2/NIACIN/B6/B12/DEXPANTH (B COMPLEX SL), by SubLINGual route daily. , Disp: , Rfl:     traMADol (ULTRAM) 50 mg tablet, Take 1-2 Tabs by mouth every six (6) hours as needed for Pain. Max Daily Amount: 400 mg., Disp: 60 Tab, Rfl: 0    clonazePAM (KLONOPIN) 0.5 mg tablet, Take  by mouth nightly as needed. , Disp: , Rfl:     promethazine (PHENERGAN) 25 mg tablet, Take 25 mg by mouth every six (6) hours as needed for Nausea., Disp: , Rfl:     ibuprofen (MOTRIN) 800 mg tablet, Take 1 Tab by mouth every six (6) hours as needed. (Patient taking differently: Take 800 mg by mouth every six (6) hours as needed. Last dose 12/17/17), Disp: 30 Tab, Rfl: 0    magnesium oxide (MAG-OX) 400 mg tablet, Take 400 mg by mouth daily. , Disp: , Rfl:     loratadine (CLARITIN) 10 mg tablet, Take 10 mg by mouth every evening., Disp: , Rfl:     LACTOBACILLUS ACIDOPHILUS (PROBIOTIC PO), Take  by mouth., Disp: , Rfl:     multivitamin (ONE A DAY) tablet, Take 1 Tab by mouth daily. , Disp: , Rfl:    Date Last Reviewed:  2/12/2019   Voiding Patterns:  Patient voids every 4 hours during the day and 1 times during the night. Patient reports that she does not empties bladder. Does not leak. Fluid Intake:  Patient drinks 1 gallon of water per day. She consumes 1 cups of caffeinated beverages per day. Patient not limit fluid intake to control bladder. Bowel Habits:  Patient demonstrates constipation. Probiotic, citricel, linzes, mirilax, smooth move. Bowel movements are soft  Personal / Social History:  · Sexually active? Yes, but very painful       Number of Personal Factors/Comorbidities that affect the Plan of Care: 3+: HIGH COMPLEXITY   EXAMINATION:   External Observation:     · Anal [place]: present  · Skin Integrity: WNL  · Q-tip Test: Painful  Palpation:       2/08. 2019     Right Left   Bulbocavernosus Pain 4/10 Pain 4/10   Ischocavernosus Superficial Transverse Perineal     Sphincter Uhrovaginal     Compressor Urethra      Obturator Internus Pain 4/10 Pain 5/10   Iliococcygeus Pain 4/10 Pain 4/10   Coccygeus     Pubococcygeus Pain 4/10 Pain 4/10   Levator Ani     Adductor tenderness Tenderness    Glut medius  Tenderness at medial insersion                   SEMG evaluation:   Date: 1/28/2019    Resting Tone: starting at 5 uV and ending after PT 0.5   Quality of Resting Tone: good after manual therapy   5 Second Hold: 5 uV   10 Second Hold: NT uV   Quality of Recruitment: Good    Quality of Relaxation: Good    Quality of Holding: Fair    Stability of Hold: Fair    Stability of Rest: good after manual      Body Structures Involved:  1. Muscles Body Functions Affected:  1. Genitourinary  2. Neuromusculoskeletal Activities and Participation Affected:  1. Mobility  2. Self Care  3. Interpersonal Interactions and Relationships   Number of elements that affect the Plan of Care: 4+: HIGH COMPLEXITY   CLINICAL PRESENTATION:   Presentation: Evolving clinical presentation with unstable and unpredictable characteristics: HIGH COMPLEXITY   CLINICAL DECISION MAKING:   Outcome Measure: Tool Used: Pelvic Floor Disability Index (PFDI-20)  Score:  Initial: 56 Most Recent: X (Date: -- )   Interpretation of Score: This survey asks questions concerning certain  bowel, bladder, or pelvic symptoms and how much this symptoms interfere with daily activiites. Each section is scored on a 0-4 scale, 4 representing the greatest disability. The scores of each section are added together for a total score out of 70. Score 0 1-13 14-27 28-41 42-55 56-69 70   Modifier CH CI CJ CK CL CM CN     Medical Necessity:   · Patient is expected to demonstrate progress in coordination to decrease assistance required with pelvic floor drops. Reason for Services/Other Comments:  · Patient continues to require skilled intervention due to pelvic pain.    Use of outcome tool(s) and clinical judgement create a POC that gives a: Difficult prediction of patient's progress: HIGH COMPLEXITY   TREATMENT:   888(In addition to Assessment/Re-Assessment sessions the following treatments were rendered)    Pre-treatment Symptoms/Complaints: Pt states that she was sore for about 3 hours after her last session. Recently had the flu. Is still feeling a little sluggish today. Pain: Initial:   Pain Intensity 1: 6  Pain Location 1: Pelvis   Post Session:  5/10     THERAPEUTIC EXERCISE: (0 minutes):  Exercises per grid below to improve coordination. Required minimal visual and tactile cues to promote proper body breathing techniques. Progressed complexity of movement as indicated. MANUAL THERAPY: (40 minutes): Soft tissue mobilization was utilized and necessary because of the patient's painful spasm and restricted motion of soft tissue. (Used abbreviations: MET - muscle energy technique; SCS- Strain counter strain; CTM- Connective tissue mobilizations; CR- Contract/relax; SP- Sustained pressure, TrP- Trigger point release, IASTM- Instrument assisted soft tissue mobilizations, TDN- Trigger point dry needling). CTM to bilateral adductors with rolling and strumming. Nerve flossing to pudendal n. SCS and Trigger point to bilateral deep transverse perineum and superficial transverse, levator ani and oI  perineum to reduce tone and improve tissue elasticity. (not performed)  Edge and cupping  to adductors with pt verbal consent.      Exercises:  Patient instructed in pelvic floor exercises listed below:   Date:  2/12/2019    Activity/Exercise    PF drops and relaxation techniques  in supine with biofeedback assist for visual feedback via external anal sensors      Nu step    Left external rotation stretch    Adductor stetch    Pt education               [email or web address]    The following educational topics were reviewed with patient: dilator instruction and goal setting  Treatment/Session Assessment: · Response to Treatment:   Pt tolerated 5 min of 2nd digit palpation into the pelvic floor. Re-assessment done today. · Compliance with Program/Exercises: Will assess as treatment progresses. · Recommendations/Intent for next treatment session: \"Next visit will focus on advancements to more challenging activities\".   Total Treatment Duration:  PT Patient Time In/Time Out  Time In: 1300  Time Out: Yesika, PT  Future Appointments   Date Time Provider Jose Cruz Maier   2/14/2019 11:00 AM Julieta Justin, DO [facility]   2/15/2019 10:00 AM Lillian Garbe, PT [facility]   2/22/2019 10:00 AM Lillian Garbe, PT [facility]   3/1/2019 10:00 AM Lillian Garbe, PT [facility]   3/7/2019 11:00 AM Lillian Garbe, PT [facility]   3/15/2019 10:00 AM Lillian Garbe, PT [facility]   3/22/2019 10:00 AM Lillian Garbe, PT [facility]   3/29/2019 10:00 AM Lillian Garbe, PT [facility]   4/4/2019 10:00 AM Lillian Garbe, PT [facility]   4/11/2019 10:00 AM Lillian Garbe, PT [facility]   4/25/2019  1:00 PM Lillian Garbe, PT [facility]

## 2019-02-15 ENCOUNTER — HOSPITAL ENCOUNTER (OUTPATIENT)
Dept: PHYSICAL THERAPY | Age: 34
Discharge: HOME | End: 2019-02-15
Payer: COMMERCIAL

## 2019-02-15 PROCEDURE — 97140 MANUAL THERAPY 1/> REGIONS: CPT

## 2019-02-15 NOTE — THERAPY RECERTIFICATION
Pastora Rojas  : 1985  Primary: Marvel Mckenzie  Secondary:  [address] at [place] [id number] [facility], [address] [place] [address],[address]  [phone]   [phone]          OUTPATIENT PHYSICAL THERAPY:Daily Note 2/15/2019    ICD-10: Treatment Diagnosis: Myalgia (M79.1)Fibromyalgia (M79.7)Other lack of coordination (R27.8)   Precautions/Allergies:   Codeine; Dermagraft [cult skin subst, human-bovine]; and Percocet [oxycodone-acetaminophen]   MD Orders: Eval and treat MEDICAL/REFERRING DIAGNOSIS:  Pelvic and perineal pain [R10.2]   DATE OF ONSET: Chronic  REFERRING PHYSICIAN: Sabas Garcia, *  RETURN PHYSICIAN APPOINTMENT: TBD       Re-Evaluation  2019  Ms. Rojas has been to therapy for 10 visits one time per week. She has made minimal gains with PT. She presents with good resting tone with biofeedback. She is only able to tolerate insertion of 1 digit into the pelvic floor for 5 min. After her visit, she reports discomfort for 3-4 hours. She has been able to tolerate active gentle exercises such as the Nu-step. She continues to have increased pain with palpation throughout the superficial and deep layers of the pelvic floor left greater than right. She did have penetrative intercourse with her  a few weeks ago resulting in pain for 2 days. She has many barriers for rehab which include numerous surgeries for endometriosis and previous attempts with PT. I expect slow and steady progress. She is motivated with PT. Due to her deficits she continues to be a good candidate for PT. INITIAL ASSESSMENT:  Ms. Bj Diego returns to therapy after having surgery with Dr. Conrad Goodell and botox with Dr. Echo Ocampo. She is now 3 weeks post op her endometrial surgery. She had previous Botox prior to surgery in the pelvic floor mm. . Her surgical and therapy history is very long and complex.   She has had multiple surgery's and multiple encoutners with pelvic floor rehabilitation. Today in the clinic, I was only able to palpate at the introitus and the first layer of pelvic floor mm. Pt reported significant pain with palpation at the vaginal opening, labia majora, and abdomen. Therefore, I suggested we wait two weeks until her next visit. She was agreeable with POC. Pt is an excellent candidate for pelvic floor therapy due to her limitations with sitting, standing, penetrative intercourse, PMH, and significant level of pain. Pt would benefit from skilled PT. PROBLEM LIST (Impacting functional limitations):  1. Decreased Strength  2. Decreased ADL/Functional Activities  3. Increased Pain  4. Decreased Flexibility/Joint Mobility  5. Decreased [place] with Home Exercise Program INTERVENTIONS PLANNED:  1. Neuromuscular re-education  2. Biofeedback as needed  3. HEP  4. Bladder retraining  5. Bladder education  6. Electrical Stimulation  7. Home Exercise Program (HEP)  8. Manual Therapy  9. Range of Motion (ROM)  10. Therapeutic Exercise/Strengthening  11. Ultrasound (US)  12. low level light therapy   TREATMENT PLAN:  Effective Dates: 2/15/2019 to 5/13/2019 (90 days). Frequency/Duration: 2 times a week for 90 Days  GOALS: (Goals have been discussed and agreed upon with patient.)  Short-Term Functional Goals: Time Frame: in 4 weeks  1. Patient will demonstrate independence with home exercise program.  Discharge Goals: Time Frame:in 12 weeks  1. Pt able to tolerate size 2 Amielle dilator for 8 min in order to tolerate vaginal penetration. 2.   Pt report pain with palpation on the left side of the pelivc floor less than 2/10. 3.  Pt will tolerate 30 min of aerobic activity without increased pelvic pain. Rehabilitation Potential For Stated Goals: Fair  Regarding Jeromy Rojas's therapy, I certify that the treatment plan above will be carried out by a therapist or under their direction.   Thank you for this referral,  Gavin Joshi, PT     Referring Physician Signature: Ilya Cheema, *              Date                    The information in this section was collected on 2/15/2019  (except where otherwise noted). HISTORY:   History of Present Injury/Illness (Reason for Referral):  Dx at 13 with endometriosis. That was when she had her first laprascropy. Has had 4 laparoscopy. First vaginal delivery 2009. Second child was born 2010. Had a vaginal delivery. Did do an ablation  which lasted for about one year. 2014 had a hysterectomy. Got sick after surgery. 8 weeks later had a vaginal dehiscence. Was septic then. Did not rebuild the cuff. The endo starts to attach the vaginal cuff. Started seeing Nahum Mendoza for pelvic floor therapy. Mayra Soares is having difficulty with palpation. Nov 2016 had a excision surgery for endometriosis. They build up the cuff. They tacked the left ovary. Eventually took out the left ovary in 2017 and found endo in the pelvis. In 2017, was dx with fibro. Betty Murphy tried to ablation of the ilioinguinal nerve. Had necrosis around the hip bone. Recently had Botox with . Dr Baron Underwood had a cytoscopy and the bladder was healthy. Went to see Dr. Garry Humphreys to  redo surgery in the pelvis for possible endo. Deadend nerve and it feels better. Vaginal valium in the past was helpful. Needling does help pain, soft tissue, and deep tissues. Unable to stand for any period of time. Does better with movement. Had tailbone pain for a while and does use cushions. Feels burring at the [facility]. Past Medical History/Comorbidities:   Ms. Steph Lopez  has a past medical history of Anxiety (9/6/2013), Bladder pain, Chronic pain, Endometriosis, Gluten intolerance, High-tone pelvic floor dysfunction, IBS (irritable bowel syndrome), Migraine, Nausea & vomiting, Ovarian cyst, PUD (peptic ulcer disease) (12/2013 ), Unspecified adverse effect of anesthesia, UTI (urinary tract infection), and Vaginal cuff dehiscence.   Ms. Steph Lopez  has a past surgical history that includes pr endometrial cryoablation; hx other surgical; hx bilateral salpingectomy; hx other surgical (11/2016); hx total laparoscopic hysterectomy; hx breast augmentation (2003); hx gyn (2000, 2004); hx dilation and curettage; hx pelvic laparoscopy (11/03/2016); hx oophorectomy (Left); hx other surgical (10/30/2018); and hx other surgical (11/06/2018). Social History/Living Environment:    Has 3 children, works during the day at [employer]  Prior Level of Function/Work/Activity:  Has been having pain for many years. Previous Treatment Approaches:          Pelvic floor therapy. Ambulatory/Rehab Services H2 Model Falls Risk Assessment    Risk Factors:       No Risk Factors Identified Ability to Rise from Chair:       (0)  Ability to rise in a single movement    Falls Prevention Plan:       No modifications necessary   Total: (5 or greater = High Risk): 0    ©2010 [facility]. All Rights Reserved. [facility] Patent #[id number]. Federal Law prohibits the replication, distribution or use without written permission from [facility] [employer]     Current Medications:    Current Outpatient Medications:     benzonatate (TESSALON) 100 mg capsule, , Disp: , Rfl: 0    pregabalin (LYRICA) 75 mg capsule, Take 75 mg by mouth two (2) times a day., Disp: , Rfl:     sertraline (ZOLOFT) 100 mg tablet, Take 150 mg by mouth nightly., Disp: , Rfl:     diazePAM (VALIUM) 10 mg tablet, 10 mg.  Vaginal suppository--uses every other night, Disp: , Rfl:     omega 3-DHA-EPA-fish oil 1,000 mg (120 mg-180 mg) capsule, Take 1 Cap by mouth., Disp: , Rfl:     melatonin 5 mg cap capsule, Take 10 mg by mouth nightly., Disp: , Rfl:     estradiol (ESTRACE) 1 mg tablet, TK 1 T PO QHS, Disp: , Rfl: 11    methen-sod phos-meth blue-hyos (UROGESIC-BLUE) 81.6-40.8-0.12 mg tab, May take 1-3 tabs per day as needed for bladder pain, Disp: 60 Tab, Rfl: 2    methocarbamol (ROBAXIN) 500 mg tablet, Take 500 mg by mouth two (2) times a day., Disp: , Rfl:     UBIDECARENONE/VITAMIN E MIXED (COQ10  PO), Take  by mouth nightly., Disp: , Rfl:     VIT B2/NIACIN/B6/B12/DEXPANTH (B COMPLEX SL), by SubLINGual route daily. , Disp: , Rfl:     traMADol (ULTRAM) 50 mg tablet, Take 1-2 Tabs by mouth every six (6) hours as needed for Pain. Max Daily Amount: 400 mg., Disp: 60 Tab, Rfl: 0    clonazePAM (KLONOPIN) 0.5 mg tablet, Take  by mouth nightly as needed. , Disp: , Rfl:     promethazine (PHENERGAN) 25 mg tablet, Take 25 mg by mouth every six (6) hours as needed for Nausea., Disp: , Rfl:     ibuprofen (MOTRIN) 800 mg tablet, Take 1 Tab by mouth every six (6) hours as needed. (Patient taking differently: Take 800 mg by mouth every six (6) hours as needed. Last dose 12/17/17), Disp: 30 Tab, Rfl: 0    magnesium oxide (MAG-OX) 400 mg tablet, Take 400 mg by mouth daily. , Disp: , Rfl:     loratadine (CLARITIN) 10 mg tablet, Take 10 mg by mouth every evening., Disp: , Rfl:     LACTOBACILLUS ACIDOPHILUS (PROBIOTIC PO), Take  by mouth., Disp: , Rfl:     multivitamin (ONE A DAY) tablet, Take 1 Tab by mouth daily. , Disp: , Rfl:    Date Last Reviewed:  2/15/2019   Voiding Patterns:  Patient voids every 4 hours during the day and 1 times during the night. Patient reports that she does not empties bladder. Does not leak. Fluid Intake:  Patient drinks 1 gallon of water per day. She consumes 1 cups of caffeinated beverages per day. Patient not limit fluid intake to control bladder. Bowel Habits:  Patient demonstrates constipation. Probiotic, citricel, linzes, mirilax, smooth move. Bowel movements are soft  Personal / Social History:  · Sexually active? Yes, but very painful       Number of Personal Factors/Comorbidities that affect the Plan of Care: 3+: HIGH COMPLEXITY   EXAMINATION:   External Observation:     · Anal [place]: present  · Skin Integrity: WNL  · Q-tip Test: Painful  Palpation:       2/08. 2019     Right Left   Bulbocavernosus Pain 4/10 Pain 4/10   Ischocavernosus     Superficial Transverse Perineal     Sphincter Uhrovaginal     Compressor Urethra      Obturator Internus Pain 4/10 Pain 5/10   Iliococcygeus Pain 4/10 Pain 4/10   Coccygeus     Pubococcygeus Pain 4/10 Pain 4/10   Levator Ani     Adductor tenderness Tenderness    Glut medius  Tenderness at medial insersion                   SEMG evaluation:   Date: 1/28/2019    Resting Tone: starting at 5 uV and ending after PT 0.5   Quality of Resting Tone: good after manual therapy   5 Second Hold: 5 uV   10 Second Hold: NT uV   Quality of Recruitment: Good    Quality of Relaxation: Good    Quality of Holding: Fair    Stability of Hold: Fair    Stability of Rest: good after manual      Body Structures Involved:  1. Muscles Body Functions Affected:  1. Genitourinary  2. Neuromusculoskeletal Activities and Participation Affected:  1. Mobility  2. Self Care  3. Interpersonal Interactions and Relationships   Number of elements that affect the Plan of Care: 4+: HIGH COMPLEXITY   CLINICAL PRESENTATION:   Presentation: Evolving clinical presentation with unstable and unpredictable characteristics: HIGH COMPLEXITY   CLINICAL DECISION MAKING:   Outcome Measure: Tool Used: Pelvic Floor Disability Index (PFDI-20)  Score:  Initial: 56 Most Recent: X (Date: -- )   Interpretation of Score: This survey asks questions concerning certain  bowel, bladder, or pelvic symptoms and how much this symptoms interfere with daily activiites. Each section is scored on a 0-4 scale, 4 representing the greatest disability. The scores of each section are added together for a total score out of 70. Score 0 1-13 14-27 28-41 42-55 56-69 70   Modifier CH CI CJ CK CL CM CN     Medical Necessity:   · Patient is expected to demonstrate progress in coordination to decrease assistance required with pelvic floor drops. Reason for Services/Other Comments:  · Patient continues to require skilled intervention due to pelvic pain.    Use of outcome tool(s) and clinical judgement create a POC that gives a: Difficult prediction of patient's progress: HIGH COMPLEXITY   TREATMENT:   888(In addition to Assessment/Re-Assessment sessions the following treatments were rendered)    Pre-treatment Symptoms/Complaints: Pt states that she is still recovering from the flu. Has been having lots of coughing which has been giving her more pelvic floor pan. Pain: Initial:   Pain Intensity 1: 6  Pain Location 1: Pelvis   Post Session:  6/10     THERAPEUTIC EXERCISE: (0 minutes):  Exercises per grid below to improve coordination. Required minimal visual and tactile cues to promote proper body breathing techniques. Progressed complexity of movement as indicated. MANUAL THERAPY: (40 minutes): Soft tissue mobilization was utilized and necessary because of the patient's painful spasm and restricted motion of soft tissue. (Used abbreviations: MET - muscle energy technique; SCS- Strain counter strain; CTM- Connective tissue mobilizations; CR- Contract/relax; SP- Sustained pressure, TrP- Trigger point release, IASTM- Instrument assisted soft tissue mobilizations, TDN- Trigger point dry needling). CTM to bilateral adductors with rolling and strumming. Nerve flossing to pudendal n. SCS and Trigger point to bilateral deep transverse perineum and superficial transverse, levator ani and oI  perineum to reduce tone and improve tissue elasticity. Edge and cupping  to adductors with pt verbal consent.    Palpation into pelvic floor 7 min total time     Exercises:  Patient instructed in pelvic floor exercises listed below:   Date:  2/15/2019    Activity/Exercise    PF drops and relaxation techniques  in supine with biofeedback assist for visual feedback via external anal sensors      Nu step    Left external rotation stretch    Adductor stetch    Pt education               [employer]    The following educational topics were reviewed with patient: sierraator instruction and goal setting  Treatment/Session Assessment:     · Response to Treatment:  Pt did have increased tone and pain in the pelvic floor on the left greater than the right today. Was able to tolerate palpation for 7 min today. Reported less than 3 hours discomfort from last weeks palpation. · Compliance with Program/Exercises: Will assess as treatment progresses. · Recommendations/Intent for next treatment session: \"Next visit will focus on advancements to more challenging activities\".   Total Treatment Duration:  PT Patient Time In/Time Out  Time In: 1000  Time Out: [address], PT  Future Appointments   Date Time Provider Jose Cruz Maier   2/22/2019 10:00 AM [place] Mar, PT [facility]   3/1/2019 10:00 AM [place] Mar, PT [facility]   3/7/2019 11:00 AM [place] Mar, PT [facility]   3/15/2019 10:00 AM [place] Mar, PT [facility]   3/22/2019 10:00 AM [place] Mar, PT [facility]   3/29/2019 10:00 AM [place] Mar, PT [facility]   4/4/2019 10:00 AM [place] Mar, PT [facility]   4/11/2019 10:00 AM [place] Mar, PT [facility]   4/25/2019  1:00 PM [place] Mar, PT [facility]   5/6/2019 10:00 AM [place] Mar, PT [facility]   5/17/2019 10:00 AM [place] Mar, PT [facility]   5/28/2019 11:00 AM [place] Mar, PT [facility]

## 2019-02-22 ENCOUNTER — HOSPITAL ENCOUNTER (OUTPATIENT)
Dept: PHYSICAL THERAPY | Age: 34
Discharge: HOME | End: 2019-02-22
Payer: COMMERCIAL

## 2019-02-22 PROCEDURE — 97140 MANUAL THERAPY 1/> REGIONS: CPT

## 2019-02-22 PROCEDURE — 97110 THERAPEUTIC EXERCISES: CPT

## 2019-02-22 NOTE — PROGRESS NOTES
Truong Rojas  : 1985  Primary: [facility]  Secondary:  [id number] at [facility]  [address] [facility], [address] [place] [address],[address], [address] [facility]  [phone]   [phone]          OUTPATIENT PHYSICAL THERAPY:Daily Note 2019    ICD-10: Treatment Diagnosis: Myalgia (M79.1)Fibromyalgia (M79.7)Other lack of coordination (R27.8)   Precautions/Allergies:   Codeine; Dermagraft [cult skin subst, human-bovine]; and Percocet [oxycodone-acetaminophen]   MD Orders: Eval and treat MEDICAL/REFERRING DIAGNOSIS:  Pelvic and perineal pain [R10.2]   DATE OF ONSET: Chronic  REFERRING PHYSICIAN: Andi Franco MD  RETURN PHYSICIAN APPOINTMENT: TBD       Re-Evaluation  2019  Ms. Rojas has been to therapy for 10 visits one time per week. She has made minimal gains with PT. She presents with good resting tone with biofeedback. She is only able to tolerate insertion of 1 digit into the pelvic floor for 5 min. After her visit, she reports discomfort for 3-4 hours. She has been able to tolerate active gentle exercises such as the Nu-step. She continues to have increased pain with palpation throughout the superficial and deep layers of the pelvic floor left greater than right. She did have penetrative intercourse with her  a few weeks ago resulting in pain for 2 days. She has many barriers for rehab which include numerous surgeries for endometriosis and previous attempts with PT. I expect slow and steady progress. She is motivated with PT. Due to her deficits she continues to be a good candidate for PT. INITIAL ASSESSMENT:  Ms. Niles Dean returns to therapy after having surgery with Dr. Tadeo Zarate and botox with Dr. Kelsea Kent. She is now 3 weeks post op her endometrial surgery. She had previous Botox prior to surgery in the pelvic floor mm. . Her surgical and therapy history is very long and complex.   She has had multiple surgery's and multiple encoutners with pelvic floor rehabilitation. Today in the clinic, I was only able to palpate at the introitus and the first layer of pelvic floor mm. Pt reported significant pain with palpation at the vaginal opening, labia majora, and abdomen. Therefore, I suggested we wait two weeks until her next visit. She was agreeable with POC. Pt is an excellent candidate for pelvic floor therapy due to her limitations with sitting, standing, penetrative intercourse, PMH, and significant level of pain. Pt would benefit from skilled PT. PROBLEM LIST (Impacting functional limitations):  1. Decreased Strength  2. Decreased ADL/Functional Activities  3. Increased Pain  4. Decreased Flexibility/Joint Mobility  5. Decreased [place] with Home Exercise Program INTERVENTIONS PLANNED:  1. Neuromuscular re-education  2. Biofeedback as needed  3. HEP  4. Bladder retraining  5. Bladder education  6. Electrical Stimulation  7. Home Exercise Program (HEP)  8. Manual Therapy  9. Range of Motion (ROM)  10. Therapeutic Exercise/Strengthening  11. Ultrasound (US)  12. low level light therapy   TREATMENT PLAN:  Effective Dates: 11/30/2018 TO 2/28/2019 (90 days). Frequency/Duration: 2 times a week for 90 Days  GOALS: (Goals have been discussed and agreed upon with patient.)  Short-Term Functional Goals: Time Frame: in 4 weeks  1. Patient will demonstrate independence with home exercise program.  Discharge Goals: Time Frame:in 12 weeks  1. Pt able to tolerate size 2 Amielle dilator for 8 min in order to tolerate vaginal penetration. 2.   Pt report pain with palpation on the left side of the pelivc floor less than 2/10. 3.  Pt will tolerate 30 min of aerobic activity without increased pelvic pain. Rehabilitation Potential For Stated Goals: Fair  Regarding Cecilio Rojas's therapy, I certify that the treatment plan above will be carried out by a therapist or under their direction.   Thank you for this referral,  Gemma Peabody, PT     Referring Physician Signature: Johnny Vuong MD              Date                    The information in this section was collected on 2/22/2019  (except where otherwise noted). HISTORY:   History of Present Injury/Illness (Reason for Referral):  Dx at 13 with endometriosis. That was when she had her first laprascropy. Has had 4 laparoscopy. First vaginal delivery 2009. Second child was born 2010. Had a vaginal delivery. Did do an ablation  which lasted for about one year. 2014 had a hysterectomy. Got sick after surgery. 8 weeks later had a vaginal dehiscence. Was septic then. Did not rebuild the cuff. The endo starts to attach the vaginal cuff. Started seeing Abigail He for pelvic floor therapy. Leary Folds is having difficulty with palpation. Nov 2016 had a excision surgery for endometriosis. They build up the cuff. They tacked the left ovary. Eventually took out the left ovary in 2017 and found endo in the pelvis. In 2017, was dx with fibro. Christianne Conde tried to ablation of the ilioinguinal nerve. Had necrosis around the hip bone. Recently had Botox with . Dr Chalo Leija had a cytoscopy and the bladder was healthy. Went to see Dr. Hira Oneil to  redo surgery in the pelvis for possible endo. Deadend nerve and it feels better. Vaginal valium in the past was helpful. Needling does help pain, soft tissue, and deep tissues. Unable to stand for any period of time. Does better with movement. Had tailbone pain for a while and does use cushions. [facility]els burring at the [facility]. Past Medical History/Comorbidities:   Ms. Becky Oneil  has a past medical history of Anxiety (9/6/2013), Bladder pain, Chronic pain, Endometriosis, Gluten intolerance, High-tone pelvic floor dysfunction, IBS (irritable bowel syndrome), Migraine, Nausea & vomiting, Ovarian cyst, PUD (peptic ulcer disease) (12/2013 ), Unspecified adverse effect of anesthesia, UTI (urinary tract infection), and Vaginal cuff dehiscence.   Ms. Becky Oneil  has a past surgical history that includes pr endometrial cryoablation; hx other surgical; hx bilateral salpingectomy; hx other surgical (11/2016); hx total laparoscopic hysterectomy; hx breast augmentation (2003); hx gyn (2000, 2004); hx dilation and curettage; hx pelvic laparoscopy (11/03/2016); hx oophorectomy (Left); hx other surgical (10/30/2018); and hx other surgical (11/06/2018). Social History/Living Environment:    Has 3 children, works during the day at [employer]  Prior Level of Function/Work/Activity:  Has been having pain for many years. Previous Treatment Approaches:          Pelvic floor therapy. Ambulatory/Rehab Services H2 Model Falls Risk Assessment    Risk Factors:       No Risk Factors Identified Ability to Rise from Chair:       (0)  Ability to rise in a single movement    Falls Prevention Plan:       No modifications necessary   Total: (5 or greater = High Risk): 0    ©2010 [facility]. All Rights Reserved. [facility] Patent #[id number]. Federal Law prohibits the replication, distribution or use without written permission from [facility] [employer]     Current Medications:    Current Outpatient Medications:     benzonatate (TESSALON) 100 mg capsule, , Disp: , Rfl: 0    pregabalin (LYRICA) 75 mg capsule, Take 75 mg by mouth two (2) times a day., Disp: , Rfl:     sertraline (ZOLOFT) 100 mg tablet, Take 150 mg by mouth nightly., Disp: , Rfl:     diazePAM (VALIUM) 10 mg tablet, 10 mg.  Vaginal suppository--uses every other night, Disp: , Rfl:     omega 3-DHA-EPA-fish oil 1,000 mg (120 mg-180 mg) capsule, Take 1 Cap by mouth., Disp: , Rfl:     melatonin 5 mg cap capsule, Take 10 mg by mouth nightly., Disp: , Rfl:     estradiol (ESTRACE) 1 mg tablet, TK 1 T PO QHS, Disp: , Rfl: 11    methen-sod phos-meth blue-hyos (UROGESIC-BLUE) 81.6-40.8-0.12 mg tab, May take 1-3 tabs per day as needed for bladder pain, Disp: 60 Tab, Rfl: 2    methocarbamol (ROBAXIN) 500 mg tablet, Take 500 mg by mouth two (2) times a day., Disp: , Rfl:     UBIDECARENONE/VITAMIN E MIXED (COQ10  PO), Take  by mouth nightly., Disp: , Rfl:     VIT B2/NIACIN/B6/B12/DEXPANTH (B COMPLEX SL), by SubLINGual route daily. , Disp: , Rfl:     traMADol (ULTRAM) 50 mg tablet, Take 1-2 Tabs by mouth every six (6) hours as needed for Pain. Max Daily Amount: 400 mg., Disp: 60 Tab, Rfl: 0    clonazePAM (KLONOPIN) 0.5 mg tablet, Take  by mouth nightly as needed. , Disp: , Rfl:     promethazine (PHENERGAN) 25 mg tablet, Take 25 mg by mouth every six (6) hours as needed for Nausea., Disp: , Rfl:     ibuprofen (MOTRIN) 800 mg tablet, Take 1 Tab by mouth every six (6) hours as needed. (Patient taking differently: Take 800 mg by mouth every six (6) hours as needed. Last dose 12/17/17), Disp: 30 Tab, Rfl: 0    magnesium oxide (MAG-OX) 400 mg tablet, Take 400 mg by mouth daily. , Disp: , Rfl:     loratadine (CLARITIN) 10 mg tablet, Take 10 mg by mouth every evening., Disp: , Rfl:     LACTOBACILLUS ACIDOPHILUS (PROBIOTIC PO), Take  by mouth., Disp: , Rfl:     multivitamin (ONE A DAY) tablet, Take 1 Tab by mouth daily. , Disp: , Rfl:    Date Last Reviewed:  2/22/2019   Voiding Patterns:  Patient voids every 4 hours during the day and 1 times during the night. Patient reports that she does not empties bladder. Does not leak. Fluid Intake:  Patient drinks 1 gallon of water per day. She consumes 1 cups of caffeinated beverages per day. Patient not limit fluid intake to control bladder. Bowel Habits:  Patient demonstrates constipation. Probiotic, citricel, linzes, mirilax, smooth move. Bowel movements are soft  Personal / Social History:  · Sexually active? Yes, but very painful       Number of Personal Factors/Comorbidities that affect the Plan of Care: 3+: HIGH COMPLEXITY   EXAMINATION:   External Observation:     · Anal [place]: present  · Skin Integrity: WNL  · Q-tip Test: Painful  Palpation:       2/08. 2019     Right Left   Bulbocavernosus Pain 4/10 Pain 4/10   Ischocavernosus     Superficial Transverse Perineal     Sphincter Uhrovaginal     Compressor Urethra      Obturator Internus Pain 4/10 Pain 5/10   Iliococcygeus Pain 4/10 Pain 4/10   Coccygeus     Pubococcygeus Pain 4/10 Pain 4/10   Levator Ani     Adductor tenderness Tenderness    Glut medius  Tenderness at medial insersion                   SEMG evaluation:   Date: 1/28/2019    Resting Tone: starting at 5 uV and ending after PT 0.5   Quality of Resting Tone: good after manual therapy   5 Second Hold: 5 uV   10 Second Hold: NT uV   Quality of Recruitment: Good    Quality of Relaxation: Good    Quality of Holding: Fair    Stability of Hold: Fair    Stability of Rest: good after manual      Body Structures Involved:  1. Muscles Body Functions Affected:  1. Genitourinary  2. Neuromusculoskeletal Activities and Participation Affected:  1. Mobility  2. Self Care  3. Interpersonal Interactions and Relationships   Number of elements that affect the Plan of Care: 4+: HIGH COMPLEXITY   CLINICAL PRESENTATION:   Presentation: Evolving clinical presentation with unstable and unpredictable characteristics: HIGH COMPLEXITY   CLINICAL DECISION MAKING:   Outcome Measure: Tool Used: Pelvic Floor Disability Index (PFDI-20)  Score:  Initial: 56 Most Recent: X (Date: -- )   Interpretation of Score: This survey asks questions concerning certain  bowel, bladder, or pelvic symptoms and how much this symptoms interfere with daily activiites. Each section is scored on a 0-4 scale, 4 representing the greatest disability. The scores of each section are added together for a total score out of 70. Score 0 1-13 14-27 28-41 42-55 56-69 70   Modifier CH CI CJ CK CL CM CN     Medical Necessity:   · Patient is expected to demonstrate progress in coordination to decrease assistance required with pelvic floor drops. Reason for Services/Other Comments:  · Patient continues to require skilled intervention due to pelvic pain.    Use of outcome tool(s) and clinical judgement create a POC that gives a: Difficult prediction of patient's progress: HIGH COMPLEXITY   TREATMENT:   (In addition to Assessment/Re-Assessment sessions the following treatments were rendered)    Pre-treatment Symptoms/Complaints: Pt states that she was sore at the pelvis for a few days after last treatment. .   Pain: Initial:      6.5 Post Session:  6/10     THERAPEUTIC EXERCISE: (8 minutes):  Exercises per grid below to improve coordination. Required minimal visual and tactile cues to promote proper body breathing techniques. Progressed complexity of movement as indicated. MANUAL THERAPY: (45 minutes): Soft tissue mobilization was utilized and necessary because of the patient's painful spasm and restricted motion of soft tissue. (Used abbreviations: MET - muscle energy technique; SCS- Strain counter strain; CTM- Connective tissue mobilizations; CR- Contract/relax; SP- Sustained pressure, TrP- Trigger point release, IASTM- Instrument assisted soft tissue mobilizations, TDN- Trigger point dry needling). CTM to bilateral adductors with rolling and strumming. Nerve flossing to pudendal n. SCS and Trigger point to bilateral deep transverse perineum and superficial transverse, levator ani and oI  perineum to reduce tone and improve tissue elasticity.   CTM to sacrotuberous ligament, piriformis, and multifidus in prone   Palpation into pelvic floor 8 min total time     Exercises:  Patient instructed in pelvic floor exercises listed below:   Date:  2/22/2019    Activity/Exercise    PF drops and relaxation techniques  in supine with biofeedback assist for visual feedback via external anal sensors      Nu step    Left external rotation stretch    Adductor stetch [id number]   Cat cow X 10    rena pose 30 sec x 4   rena pose hands to the right and left 30 sec x 4     [employer] Portal    The following educational topics were reviewed with patient: dilator instruction and goal setting  Treatment/Session Assessment:     · Response to Treatment:  Pt reported no significant pain in the pelvic floor region. However, she did report improved discomfort in the lumbar spine. Advised patient to use her tens unit at home at the pelvis and lumbar spine today. · Compliance with Program/Exercises: Will assess as treatment progresses. · Recommendations/Intent for next treatment session: \"Next visit will focus on advancements to more challenging activities\".   Total Treatment Duration:  PT Patient Time In/Time Out  Time In: 1000  Time Out: 475 Progress MALIA Valenzuela  Future Appointments   Date Time Provider Jose Cruz Maier   3/1/2019 10:00 AM Mckeonkatie Lynn, PT [facility]   3/7/2019 11:00 AM Mckeon Lynn, PT [facility]   3/15/2019 10:00 AM Mckeon Lynn, PT [facility]   3/22/2019 10:00 AM Mckeon Lynn, PT [facility]   3/29/2019 10:00 AM Mckeon Lynn, PT [facility]   4/4/2019 10:00 AM Mckeon Lynn, PT [facility]   4/11/2019 10:00 AM Mckeon Lynn, PT [facility]   4/25/2019  1:00 PM Mckeon Lynn, PT [facility]   5/6/2019 10:00 AM Mckeon Lynn, PT [facility]   5/17/2019 10:00 AM Mckeon Lynn, PT [facility]   5/28/2019 11:00 AM Mckeon Lynn, PT [facility]

## 2019-03-01 ENCOUNTER — HOSPITAL ENCOUNTER (OUTPATIENT)
Dept: PHYSICAL THERAPY | Age: 34
Discharge: HOME | End: 2019-03-01
Payer: COMMERCIAL

## 2019-03-01 PROCEDURE — 97140 MANUAL THERAPY 1/> REGIONS: CPT

## 2019-03-01 PROCEDURE — 97110 THERAPEUTIC EXERCISES: CPT

## 2019-03-01 NOTE — PROGRESS NOTES
Michele Rojas  : 1985  Primary: [employer]  Secondary:  [address]  at [facility]  [address], [address] [address],[address] [facility]  [phone]   [phone]          OUTPATIENT PHYSICAL THERAPY:Daily Note 3/1/2019    ICD-10: Treatment Diagnosis: Myalgia (M79.1)Fibromyalgia (M79.7)Other lack of coordination (R27.8)   Precautions/Allergies:   Codeine; Dermagraft [cult skin subst, human-bovine]; and Percocet [oxycodone-acetaminophen]   MD Orders: Eval and treat MEDICAL/REFERRING DIAGNOSIS:  Pelvic and perineal pain [R10.2]   DATE OF ONSET: Chronic  REFERRING PHYSICIAN: Valentina Hancock MD  RETURN PHYSICIAN APPOINTMENT: TBD       Re-Evaluation  2019  Ms. Rojas has been to therapy for 10 visits one time per week. She has made minimal gains with PT. She presents with good resting tone with biofeedback. She is only able to tolerate insertion of 1 digit into the pelvic floor for 5 min. After her visit, she reports discomfort for 3-4 hours. She has been able to tolerate active gentle exercises such as the Nu-step. She continues to have increased pain with palpation throughout the superficial and deep layers of the pelvic floor left greater than right. She did have penetrative intercourse with her  a few weeks ago resulting in pain for 2 days. She has many barriers for rehab which include numerous surgeries for endometriosis and previous attempts with PT. I expect slow and steady progress. She is motivated with PT. Due to her deficits she continues to be a good candidate for PT. INITIAL ASSESSMENT:  Ms. Louie Velazquez returns to therapy after having surgery with Dr. Matilde Guardado and botox with Dr. Tiera Valdivia. She is now 3 weeks post op her endometrial surgery. She had previous Botox prior to surgery in the pelvic floor mm. . Her surgical and therapy history is very long and complex.   She has had multiple surgery's and multiple encoutners with pelvic floor rehabilitation. Today in the clinic, I was only able to palpate at the introitus and the first layer of pelvic floor mm. Pt reported significant pain with palpation at the vaginal opening, labia majora, and abdomen. Therefore, I suggested we wait two weeks until her next visit. She was agreeable with POC. Pt is an excellent candidate for pelvic floor therapy due to her limitations with sitting, standing, penetrative intercourse, PMH, and significant level of pain. Pt would benefit from skilled PT. PROBLEM LIST (Impacting functional limitations):  1. Decreased Strength  2. Decreased ADL/Functional Activities  3. Increased Pain  4. Decreased Flexibility/Joint Mobility  5. Decreased [place] with Home Exercise Program INTERVENTIONS PLANNED:  1. Neuromuscular re-education  2. Biofeedback as needed  3. HEP  4. Bladder retraining  5. Bladder education  6. Electrical Stimulation  7. Home Exercise Program (HEP)  8. Manual Therapy  9. Range of Motion (ROM)  10. Therapeutic Exercise/Strengthening  11. Ultrasound (US)  12. low level light therapy   TREATMENT PLAN:  Effective Dates: 11/30/2018 TO 2/28/2019 (90 days). Frequency/Duration: 2 times a week for 90 Days  GOALS: (Goals have been discussed and agreed upon with patient.)  Short-Term Functional Goals: Time Frame: in 4 weeks  1. Patient will demonstrate independence with home exercise program.  Discharge Goals: Time Frame:in 12 weeks  1. Pt able to tolerate size 2 Amielle dilator for 8 min in order to tolerate vaginal penetration. 2.   Pt report pain with palpation on the left side of the pelivc floor less than 2/10. 3.  Pt will tolerate 30 min of aerobic activity without increased pelvic pain. Rehabilitation Potential For Stated Goals: Fair  Regarding Jeromy Rojas's therapy, I certify that the treatment plan above will be carried out by a therapist or under their direction.   Thank you for this referral,  Gavin Joshi, PT     Referring Physician Signature: Deanna Baez MD              Date                    The information in this section was collected on 3/1/2019  (except where otherwise noted). HISTORY:   History of Present Injury/Illness (Reason for Referral):  Dx at 13 with endometriosis. That was when she had her first laprascropy. Has had 4 laparoscopy. First vaginal delivery 2009. Second child was born 2010. Had a vaginal delivery. Did do an ablation  which lasted for about one year. 2014 had a hysterectomy. Got sick after surgery. 8 weeks later had a vaginal dehiscence. Was septic then. Did not rebuild the cuff. The endo starts to attach the vaginal cuff. Started seeing [facility] for pelvic floor therapy. Almita Berger is having difficulty with palpation. Nov 2016 had a excision surgery for endometriosis. They build up the cuff. They tacked the left ovary. Eventually took out the left ovary in 2017 and found endo in the pelvis. In 2017, was dx with fibro. Ruth Huntley tried to ablation of the ilioinguinal nerve. Had necrosis around the hip bone. Recently had Botox with . Dr Tenisha Wallace had a cytoscopy and the bladder was healthy. Went to see Dr. Paige Solorzano to  redo surgery in the pelvis for possible endo. Deadend nerve and it feels better. Vaginal valium in the past was helpful. Needling does help pain, soft tissue, and deep tissues. Unable to stand for any period of time. Does better with movement. Had tailbone pain for a while and does use cushions. [facility]els [facility] at the [facility]. Past Medical History/Comorbidities:   Ms. Julia Coffey  has a past medical history of Anxiety (9/6/2013), Bladder pain, Chronic pain, Endometriosis, Gluten intolerance, High-tone pelvic floor dysfunction, IBS (irritable bowel syndrome), Migraine, Nausea & vomiting, Ovarian cyst, PUD (peptic ulcer disease) (12/2013 ), Unspecified adverse effect of anesthesia, UTI (urinary tract infection), and Vaginal cuff dehiscence.   Ms. Julia Coffey  has a past surgical history that includes pr endometrial cryoablation; hx other surgical; hx bilateral salpingectomy; hx other surgical (11/2016); hx total laparoscopic hysterectomy; hx breast augmentation (2003); hx gyn (2000, 2004); hx dilation and curettage; hx pelvic laparoscopy (11/03/2016); hx oophorectomy (Left); hx other surgical (10/30/2018); and hx other surgical (11/06/2018). Social History/Living Environment:    Has 3 children, works during the day at [employer]  Prior Level of Function/Work/Activity:  Has been having pain for many years. Previous Treatment Approaches:          Pelvic floor therapy. Ambulatory/Rehab Services H2 Model Falls Risk Assessment    Risk Factors:       No Risk Factors Identified Ability to Rise from Chair:       (0)  Ability to rise in a single movement    Falls Prevention Plan:       No modifications necessary   Total: (5 or greater = High Risk): 0    ©2010 [facility] [employer]. All Rights Reserved. [facility] Patent #[id number]. Federal Law prohibits the replication, distribution or use without written permission from [facility] [employer]     Current Medications:    Current Outpatient Medications:     benzonatate (TESSALON) 100 mg capsule, , Disp: , Rfl: 0    pregabalin (LYRICA) 75 mg capsule, Take 75 mg by mouth two (2) times a day., Disp: , Rfl:     sertraline (ZOLOFT) 100 mg tablet, Take 150 mg by mouth nightly., Disp: , Rfl:     diazePAM (VALIUM) 10 mg tablet, 10 mg.  Vaginal suppository--uses every other night, Disp: , Rfl:     omega 3-DHA-EPA-fish oil 1,000 mg (120 mg-180 mg) capsule, Take 1 Cap by mouth., Disp: , Rfl:     melatonin 5 mg cap capsule, Take 10 mg by mouth nightly., Disp: , Rfl:     estradiol (ESTRACE) 1 mg tablet, TK 1 T PO QHS, Disp: , Rfl: 11    methen-sod phos-meth blue-hyos (UROGESIC-BLUE) 81.6-40.8-0.12 mg tab, May take 1-3 tabs per day as needed for bladder pain, Disp: 60 Tab, Rfl: 2    methocarbamol (ROBAXIN) 500 mg tablet, Take 500 mg by mouth two (2) times a day., Disp: , Rfl:     UBIDECARENONE/VITAMIN E MIXED (COQ10  PO), Take  by mouth nightly., Disp: , Rfl:     VIT B2/NIACIN/B6/B12/DEXPANTH (B COMPLEX SL), by SubLINGual route daily. , Disp: , Rfl:     traMADol (ULTRAM) 50 mg tablet, Take 1-2 Tabs by mouth every six (6) hours as needed for Pain. Max Daily Amount: 400 mg., Disp: 60 Tab, Rfl: 0    clonazePAM (KLONOPIN) 0.5 mg tablet, Take  by mouth nightly as needed. , Disp: , Rfl:     promethazine (PHENERGAN) 25 mg tablet, Take 25 mg by mouth every six (6) hours as needed for Nausea., Disp: , Rfl:     ibuprofen (MOTRIN) 800 mg tablet, Take 1 Tab by mouth every six (6) hours as needed. (Patient taking differently: Take 800 mg by mouth every six (6) hours as needed. Last dose 12/17/17), Disp: 30 Tab, Rfl: 0    magnesium oxide (MAG-OX) 400 mg tablet, Take 400 mg by mouth daily. , Disp: , Rfl:     loratadine (CLARITIN) 10 mg tablet, Take 10 mg by mouth every evening., Disp: , Rfl:     LACTOBACILLUS ACIDOPHILUS (PROBIOTIC PO), Take  by mouth., Disp: , Rfl:     multivitamin (ONE A DAY) tablet, Take 1 Tab by mouth daily. , Disp: , Rfl:    Date Last Reviewed:  3/1/2019   Voiding Patterns:  Patient voids every 4 hours during the day and 1 times during the night. Patient reports that she does not empties bladder. Does not leak. Fluid Intake:  Patient drinks 1 gallon of water per day. She consumes 1 cups of caffeinated beverages per day. Patient not limit fluid intake to control bladder. Bowel Habits:  Patient demonstrates constipation. Probiotic, citricel, linzes, mirilax, smooth move. Bowel movements are soft  Personal / Social History:  · Sexually active? Yes, but very painful       Number of Personal Factors/Comorbidities that affect the Plan of Care: 3+: HIGH COMPLEXITY   EXAMINATION:   External Observation:     · Anal [place]: present  · Skin Integrity: WNL  · Q-tip Test: Painful  Palpation:       2/08. 2019     Right Left   Bulbocavernosus Pain 4/10 Pain 4/10   Ischocavernosus     Superficial Transverse Perineal     Sphincter Uhrovaginal     Compressor Urethra      Obturator Internus Pain 4/10 Pain 5/10   Iliococcygeus Pain 4/10 Pain 4/10   Coccygeus     Pubococcygeus Pain 4/10 Pain 4/10   Levator Ani     Adductor tenderness Tenderness    Glut medius  Tenderness at medial insersion                   SEMG evaluation:   Date: 1/28/2019    Resting Tone: starting at 5 uV and ending after PT 0.5   Quality of Resting Tone: good after manual therapy   5 Second Hold: 5 uV   10 Second Hold: NT uV   Quality of Recruitment: Good    Quality of Relaxation: Good    Quality of Holding: Fair    Stability of Hold: Fair    Stability of Rest: good after manual      Body Structures Involved:  1. Muscles Body Functions Affected:  1. Genitourinary  2. Neuromusculoskeletal Activities and Participation Affected:  1. Mobility  2. Self Care  3. Interpersonal Interactions and Relationships   Number of elements that affect the Plan of Care: 4+: HIGH COMPLEXITY   CLINICAL PRESENTATION:   Presentation: Evolving clinical presentation with unstable and unpredictable characteristics: HIGH COMPLEXITY   CLINICAL DECISION MAKING:   Outcome Measure: Tool Used: Pelvic Floor Disability Index (PFDI-20)  Score:  Initial: 56 Most Recent: X (Date: -- )   Interpretation of Score: This survey asks questions concerning certain  bowel, bladder, or pelvic symptoms and how much this symptoms interfere with daily activiites. Each section is scored on a 0-4 scale, 4 representing the greatest disability. The scores of each section are added together for a total score out of 70. Score 0 1-13 14-27 28-41 42-55 56-69 70   Modifier CH CI CJ CK CL CM CN     Medical Necessity:   · Patient is expected to demonstrate progress in coordination to decrease assistance required with pelvic floor drops. Reason for Services/Other Comments:  · Patient continues to require skilled intervention due to pelvic pain.    Use of outcome tool(s) and clinical judgement create a POC that gives a: Difficult prediction of patient's progress: HIGH COMPLEXITY   TREATMENT:   (In addition to Assessment/Re-Assessment sessions the following treatments were rendered)    Pre-treatment Symptoms/Complaints: Pt has had a migraine this week after getting injections in her spine. Has not done dilators  Pain: Initial:   Pain Intensity 1: 6  Pain Location 1: Pelvis  Post Session:  6/10     THERAPEUTIC EXERCISE: (25 minutes):  Exercises per grid below to improve coordination. Required minimal visual and tactile cues to promote proper body breathing techniques. Progressed complexity of movement as indicated. MANUAL THERAPY: (30 minutes): Soft tissue mobilization was utilized and necessary because of the patient's painful spasm and restricted motion of soft tissue. (Used abbreviations: MET - muscle energy technique; SCS- Strain counter strain; CTM- Connective tissue mobilizations; CR- Contract/relax; SP- Sustained pressure, TrP- Trigger point release, IASTM- Instrument assisted soft tissue mobilizations, TDN- Trigger point dry needling). CTM to bilateral adductors with rolling and strumming. Nerve flossing to pudendal n. SCS and Trigger point to bilateral deep transverse perineum and superficial transverse, levator ani and oI  perineum to reduce tone and improve tissue elasticity.   CTM to sacrotuberous ligament, piriformis, and multifidus in prone (not performed)  Palpation into pelvic floor 87min total time     Exercises:  Patient instructed in pelvic floor exercises listed below:   Date:  3/1/2019    Activity/Exercise    PF drops and relaxation techniques  in supine with biofeedback assist for visual feedback via external anal sensors      Nu step    Left external rotation stretch    Adductor stetch    Cat cow    rena pose    rena pose hands to the right and left    Bridge X 10 x 2   Clam shells X 3   Hip extension X 10 B   Prone heel squeeze X 10 x 5 sec hold     [employer]    The following educational topics were reviewed with patient:   Treatment/Session Assessment:     · Response to Treatment:  Pt did reuqire increased cueing for glut activation with exercise. Will continue to evaluate muscle strength of both gluts and multifidus. · Compliance with Program/Exercises: Will assess as treatment progresses. · Recommendations/Intent for next treatment session: \"Next visit will focus on advancements to more challenging activities\".   Total Treatment Duration:  PT Patient Time In/Time Out  Time In: 1000  Time Out: [address], PT  Future Appointments   Date Time Provider Jose Cruz Maier   3/7/2019 11:00 AM Brain Renae, PT [facility]   3/15/2019 10:00 AM Brain Renae, PT [facility]   3/22/2019 10:00 AM Brain Renae, PT [facility]   3/29/2019 10:00 AM Brain Renae, PT [facility]   4/4/2019 10:00 AM Brain Renae, PT [facility]   4/11/2019 10:00 AM Brain Renae, PT [facility]   4/25/2019  1:00 PM Brain Renae, PT [facility]   5/6/2019 10:00 AM Brain Renae, PT [facility]   5/17/2019 10:00 AM Brain Renae, PT [facility]   5/23/2019  8:00 AM Brain Renae, PT [facility]   5/28/2019 11:00 AM Brain Renae, PT [facility]

## 2019-03-07 ENCOUNTER — HOSPITAL ENCOUNTER (OUTPATIENT)
Dept: PHYSICAL THERAPY | Age: 34
Discharge: HOME | End: 2019-03-07
Payer: COMMERCIAL

## 2019-03-07 PROCEDURE — 97140 MANUAL THERAPY 1/> REGIONS: CPT

## 2019-03-07 PROCEDURE — 97110 THERAPEUTIC EXERCISES: CPT

## 2019-03-07 NOTE — PROGRESS NOTES
Lennie Rojas  : 1985  Primary: [facility]  Secondary:  [address]  at [facility]  [address], [address] [address],[address]  [phone]   [phone]          OUTPATIENT PHYSICAL THERAPY:Daily Note 3/7/2019    ICD-10: Treatment Diagnosis: Myalgia (M79.1)Fibromyalgia (M79.7)Other lack of coordination (R27.8)   Precautions/Allergies:   Codeine; Dermagraft [cult skin subst, human-bovine]; and Percocet [oxycodone-acetaminophen]   MD Orders: Eval and treat MEDICAL/REFERRING DIAGNOSIS:  Pelvic and perineal pain [R10.2]   DATE OF ONSET: Chronic  REFERRING PHYSICIAN: Chelsea Ramirez MD  RETURN PHYSICIAN APPOINTMENT: TBD       Re-Evaluation  2019  Ms. Rojas has been to therapy for 10 visits one time per week. She has made minimal gains with PT. She presents with good resting tone with biofeedback. She is only able to tolerate insertion of 1 digit into the pelvic floor for 5 min. After her visit, she reports discomfort for 3-4 hours. She has been able to tolerate active gentle exercises such as the Nu-step. She continues to have increased pain with palpation throughout the superficial and deep layers of the pelvic floor left greater than right. She did have penetrative intercourse with her  a few weeks ago resulting in pain for 2 days. She has many barriers for rehab which include numerous surgeries for endometriosis and previous attempts with PT. I expect slow and steady progress. She is motivated with PT. Due to her deficits she continues to be a good candidate for PT. INITIAL ASSESSMENT:  Ms. Janna Mortensen returns to therapy after having surgery with Dr. Rebecca Pereyra and botox with Dr. Maty Cook. She is now 3 weeks post op her endometrial surgery. She had previous Botox prior to surgery in the pelvic floor mm. . Her surgical and therapy history is very long and complex.   She has had multiple surgery's and multiple encoutners with pelvic floor rehabilitation. Today in the clinic, I was only able to palpate at the introitus and the first layer of pelvic floor mm. Pt reported significant pain with palpation at the vaginal opening, labia majora, and abdomen. Therefore, I suggested we wait two weeks until her next visit. She was agreeable with POC. Pt is an excellent candidate for pelvic floor therapy due to her limitations with sitting, standing, penetrative intercourse, PMH, and significant level of pain. Pt would benefit from skilled PT. PROBLEM LIST (Impacting functional limitations):  1. Decreased Strength  2. Decreased ADL/Functional Activities  3. Increased Pain  4. Decreased Flexibility/Joint Mobility  5. Decreased [place] with Home Exercise Program INTERVENTIONS PLANNED:  1. Neuromuscular re-education  2. Biofeedback as needed  3. HEP  4. Bladder retraining  5. Bladder education  6. Electrical Stimulation  7. Home Exercise Program (HEP)  8. Manual Therapy  9. Range of Motion (ROM)  10. Therapeutic Exercise/Strengthening  11. Ultrasound (US)  12. low level light therapy   TREATMENT PLAN:  Effective Dates: 11/30/2018 TO 2/28/2019 (90 days). Frequency/Duration: 2 times a week for 90 Days  GOALS: (Goals have been discussed and agreed upon with patient.)  Short-Term Functional Goals: Time Frame: in 4 weeks  1. Patient will demonstrate independence with home exercise program.  Discharge Goals: Time Frame:in 12 weeks  1. Pt able to tolerate size 2 Amielle dilator for 8 min in order to tolerate vaginal penetration. 2.   Pt report pain with palpation on the left side of the pelivc floor less than 2/10. 3.  Pt will tolerate 30 min of aerobic activity without increased pelvic pain. Rehabilitation Potential For Stated Goals: Fair  Regarding Schuyler Rojas's therapy, I certify that the treatment plan above will be carried out by a therapist or under their direction.   Thank you for this referral,  Erlinda Souza, PT     Referring Physician Signature: Astrid Fischer MD              Date                    The information in this section was collected on 3/7/2019  (except where otherwise noted). HISTORY:   History of Present Injury/Illness (Reason for Referral):  Dx at 13 with endometriosis. That was when she had her first laprascropy. Has had 4 laparoscopy. First vaginal delivery 2009. Second child was born 2010. Had a vaginal delivery. Did do an ablation  which lasted for about one year. 2014 had a hysterectomy. Got sick after surgery. 8 weeks later had a vaginal dehiscence. Was septic then. Did not rebuild the cuff. The endo starts to attach the vaginal cuff. Started seeing Katie Mojica for pelvic floor therapy. Saúl Vaughan is having difficulty with palpation. Nov 2016 had a excision surgery for endometriosis. They build up the cuff. They tacked the left ovary. Eventually took out the left ovary in 2017 and found endo in the pelvis. In 2017, was dx with fibro. Yandel Akhtar tried to ablation of the ilioinguinal nerve. Had necrosis around the hip bone. Recently had Botox with Dr. Dr Melisa Munoz had a cytoscopy and the bladder was healthy. Went to see Dr. Margot Lyn to  redo surgery in the pelvis for possible endo. Deadend nerve and it feels better. Vaginal valium in the past was helpful. Needling does help pain, soft tissue, and deep tissues. Unable to stand for any period of time. Does better with movement. Had tailbone pain for a while and does use cushions. Feels burring at the [facility]. Past Medical History/Comorbidities:   Ms. Gildardo Romero  has a past medical history of Anxiety (9/6/2013), Bladder pain, Chronic pain, Endometriosis, Gluten intolerance, High-tone pelvic floor dysfunction, IBS (irritable bowel syndrome), Migraine, Nausea & vomiting, Ovarian cyst, PUD (peptic ulcer disease) (12/2013 ), Unspecified adverse effect of anesthesia, UTI (urinary tract infection), and Vaginal cuff dehiscence.   Ms. Gildardo Romero  has a past surgical history that includes pr endometrial cryoablation; hx other surgical; hx bilateral salpingectomy; hx other surgical (11/2016); hx total laparoscopic hysterectomy; hx breast augmentation (2003); hx gyn (2000, 2004); hx dilation and curettage; hx pelvic laparoscopy (11/03/2016); hx oophorectomy (Left); hx other surgical (10/30/2018); and hx other surgical (11/06/2018). Social History/Living Environment:    Has 3 children, works during the day at [email or web address]  Prior Level of Function/Work/Activity:  Has been having pain for many years. Previous Treatment Approaches:          Pelvic floor therapy. Ambulatory/Rehab Services H2 Model Falls Risk Assessment    Risk Factors:       No Risk Factors Identified Ability to Rise from Chair:       (0)  Ability to rise in a single movement    Falls Prevention Plan:       No modifications necessary   Total: (5 or greater = High Risk): 0    ©2010 [facility] of [employer]. All Rights Reserved. [facility] [employer] Patent #[id number]. Federal Law prohibits the replication, distribution or use without written permission from [facility] [employer]     Current Medications:    Current Outpatient Medications:     benzonatate (TESSALON) 100 mg capsule, , Disp: , Rfl: 0    pregabalin (LYRICA) 75 mg capsule, Take 75 mg by mouth two (2) times a day., Disp: , Rfl:     sertraline (ZOLOFT) 100 mg tablet, Take 150 mg by mouth nightly., Disp: , Rfl:     diazePAM (VALIUM) 10 mg tablet, 10 mg.  Vaginal suppository--uses every other night, Disp: , Rfl:     omega 3-DHA-EPA-fish oil 1,000 mg (120 mg-180 mg) capsule, Take 1 Cap by mouth., Disp: , Rfl:     melatonin 5 mg cap capsule, Take 10 mg by mouth nightly., Disp: , Rfl:     estradiol (ESTRACE) 1 mg tablet, TK 1 T PO QHS, Disp: , Rfl: 11    methen-sod phos-meth blue-hyos (UROGESIC-BLUE) 81.6-40.8-0.12 mg tab, May take 1-3 tabs per day as needed for bladder pain, Disp: 60 Tab, Rfl: 2    methocarbamol (ROBAXIN) 500 mg tablet, Take 500 mg by mouth two (2) times a day., Disp: , Rfl:     UBIDECARENONE/VITAMIN E MIXED (COQ10  PO), Take  by mouth nightly., Disp: , Rfl:     VIT B2/NIACIN/B6/B12/DEXPANTH (B COMPLEX SL), by SubLINGual route daily. , Disp: , Rfl:     traMADol (ULTRAM) 50 mg tablet, Take 1-2 Tabs by mouth every six (6) hours as needed for Pain. Max Daily Amount: 400 mg., Disp: 60 Tab, Rfl: 0    clonazePAM (KLONOPIN) 0.5 mg tablet, Take  by mouth nightly as needed. , Disp: , Rfl:     promethazine (PHENERGAN) 25 mg tablet, Take 25 mg by mouth every six (6) hours as needed for Nausea., Disp: , Rfl:     ibuprofen (MOTRIN) 800 mg tablet, Take 1 Tab by mouth every six (6) hours as needed. (Patient taking differently: Take 800 mg by mouth every six (6) hours as needed. Last dose 12/17/17), Disp: 30 Tab, Rfl: 0    magnesium oxide (MAG-OX) 400 mg tablet, Take 400 mg by mouth daily. , Disp: , Rfl:     loratadine (CLARITIN) 10 mg tablet, Take 10 mg by mouth every evening., Disp: , Rfl:     LACTOBACILLUS ACIDOPHILUS (PROBIOTIC PO), Take  by mouth., Disp: , Rfl:     multivitamin (ONE A DAY) tablet, Take 1 Tab by mouth daily. , Disp: , Rfl:    Date Last Reviewed:  3/7/2019   Voiding Patterns:  Patient voids every 4 hours during the day and 1 times during the night. Patient reports that she does not empties bladder. Does not leak. Fluid Intake:  Patient drinks 1 gallon of water per day. She consumes 1 cups of caffeinated beverages per day. Patient not limit fluid intake to control bladder. Bowel Habits:  Patient demonstrates constipation. Probiotic, citricel, linzes, mirilax, smooth move. Bowel movements are soft  Personal / Social History:  · Sexually active? Yes, but very painful       Number of Personal Factors/Comorbidities that affect the Plan of Care: 3+: HIGH COMPLEXITY   EXAMINATION:   External Observation:     · Anal [place]: present  · Skin Integrity: WNL  · Q-tip Test: Painful  Palpation:       2/08. 2019     Right Left   Bulbocavernosus Pain 4/10 Pain 4/10   Ischocavernosus     Superficial Transverse Perineal     Sphincter Uhrovaginal     Compressor Urethra      Obturator Internus Pain 4/10 Pain 5/10   Iliococcygeus Pain 4/10 Pain 4/10   Coccygeus     Pubococcygeus Pain 4/10 Pain 4/10   Levator Ani     Adductor tenderness Tenderness    Glut medius  Tenderness at medial insersion                   SEMG evaluation:   Date: 1/28/2019    Resting Tone: starting at 5 uV and ending after PT 0.5   Quality of Resting Tone: good after manual therapy   5 Second Hold: 5 uV   10 Second Hold: NT uV   Quality of Recruitment: Good    Quality of Relaxation: Good    Quality of Holding: Fair    Stability of Hold: Fair    Stability of Rest: good after manual      Body Structures Involved:  1. Muscles Body Functions Affected:  1. Genitourinary  2. Neuromusculoskeletal Activities and Participation Affected:  1. Mobility  2. Self Care  3. Interpersonal Interactions and Relationships   Number of elements that affect the Plan of Care: 4+: HIGH COMPLEXITY   CLINICAL PRESENTATION:   Presentation: Evolving clinical presentation with unstable and unpredictable characteristics: HIGH COMPLEXITY   CLINICAL DECISION MAKING:   Outcome Measure: Tool Used: Pelvic Floor Disability Index (PFDI-20)  Score:  Initial: 56 Most Recent: X (Date: -- )   Interpretation of Score: This survey asks questions concerning certain  bowel, bladder, or pelvic symptoms and how much this symptoms interfere with daily activiites. Each section is scored on a 0-4 scale, 4 representing the greatest disability. The scores of each section are added together for a total score out of 70. Score 0 1-13 14-27 28-41 42-55 56-69 70   Modifier CH CI CJ CK CL CM CN     Medical Necessity:   · Patient is expected to demonstrate progress in coordination to decrease assistance required with pelvic floor drops. Reason for Services/Other Comments:  · Patient continues to require skilled intervention due to pelvic pain.    Use of outcome tool(s) and clinical judgement create a POC that gives a: Difficult prediction of patient's progress: HIGH COMPLEXITY   TREATMENT:   (In addition to Assessment/Re-Assessment sessions the following treatments were rendered)    Pre-treatment Symptoms/Complaints: Pt was dry needled by a friend yesterday in the adductors. She thought that was helpful. She feels some low back pain since her injections. Has been doing her exercises at home. No issues noted. She did see the rheumatologist and she suggested that the patient my have Maritza Fortune. Suggested swimming She may begin swimming soon. Pain: Initial:   Pain Intensity 1: 6  Pain Location 1: Pelvis  Post Session:  6/10     THERAPEUTIC EXERCISE: (25 minutes):  Exercises per grid below to improve coordination. Required minimal visual and tactile cues to promote proper body breathing techniques. Progressed complexity of movement as indicated. MANUAL THERAPY: (30 minutes): Soft tissue mobilization was utilized and necessary because of the patient's painful spasm and restricted motion of soft tissue. (Used abbreviations: MET - muscle energy technique; SCS- Strain counter strain; CTM- Connective tissue mobilizations; CR- Contract/relax; SP- Sustained pressure, TrP- Trigger point release, IASTM- Instrument assisted soft tissue mobilizations, TDN- Trigger point dry needling). CTM to bilateral adductors with rolling and strumming. Nerve flossing to pudendal n. SCS and Trigger point to bilateral deep transverse perineum and superficial transverse, levator ani and oI  perineum to reduce tone and improve tissue elasticity.   CTM to sacrotuberous ligament, piriformis, and multifidus in prone   Palpation into pelvic floor 8min total time     Exercises:  Patient instructed in pelvic floor exercises listed below:   Date:  3/7/2019    Activity/Exercise    PF drops and relaxation techniques  in supine with biofeedback assist for visual feedback via external anal sensors      Nu step    Left external rotation stretch    Adductor stetch    Cat cow    rena pose    rena pose hands to the right and left    Bridge X 10 x 2   Clam shells X 3   Hip extension X 10 B   Sidelying Hip Extension in Abduction   Swimming -  Quadruped Alternating Leg Ext  Quadruped Hip Extension Kicks   x 10  X 10   X 10   X 10   Prone heel squeeze X 10 x 5 sec hold     [employer] Portal    The following educational topics were reviewed with patient:   Treatment/Session Assessment:   Pt did well with all exercises. Also encouraged swimming. Was able to tolerate palpation in the pelvic floor. · Response to Treatment: updated HEP. · Compliance with Program/Exercises: Will assess as treatment progresses. · Recommendations/Intent for next treatment session: \"Next visit will focus on advancements to more challenging activities\".   Total Treatment Duration:  PT Patient Time In/Time Out  Time In: 1100  Time Out: [address], PT  Future Appointments   Date Time Provider Jose Cruz Maier   3/15/2019 10:00 AM Donia Scripture, PT [facility]   3/22/2019 10:00 AM Donia Scripture, PT [facility]   3/29/2019 10:00 AM Donia Scripture, PT [facility]   4/4/2019 10:00 AM Donia Scripture, PT [facility]   4/11/2019 10:00 AM Donia Scripture, PT [facility]   4/25/2019  1:00 PM Donia Scripture, PT [facility]   5/6/2019 10:00 AM Donia Scripture, PT [facility]   5/17/2019 10:00 AM Donia Scripture, PT [facility]   5/23/2019  8:00 AM Donia Scripture, PT [facility]   5/28/2019 11:00 AM Donia Scripture, PT [facility]

## 2019-03-15 ENCOUNTER — HOSPITAL ENCOUNTER (OUTPATIENT)
Dept: PHYSICAL THERAPY | Age: 34
Discharge: HOME | End: 2019-03-15
Payer: COMMERCIAL

## 2019-03-15 PROCEDURE — 97140 MANUAL THERAPY 1/> REGIONS: CPT

## 2019-03-15 NOTE — PROGRESS NOTES
Jeromy Rojas  : 1985  Primary: Kiah [employer]  Secondary:  [address] at [place] [id number] [facility], [address] [place] [address],[address], [address] [facility]  [phone]   [phone]          OUTPATIENT PHYSICAL THERAPY:Daily Note 3/15/2019    ICD-10: Treatment Diagnosis: Myalgia (M79.1)Fibromyalgia (M79.7)Other lack of coordination (R27.8)   Precautions/Allergies:   Codeine; Dermagraft [cult skin subst, human-bovine]; and Percocet [oxycodone-acetaminophen]   MD Orders: Eval and treat MEDICAL/REFERRING DIAGNOSIS:  Pelvic and perineal pain [R10.2]   DATE OF ONSET: Chronic  REFERRING PHYSICIAN: Ana Brown MD  RETURN PHYSICIAN APPOINTMENT: TBD       Re-Evaluation  2019  Ms. Rojas has been to therapy for 10 visits one time per week. She has made minimal gains with PT. She presents with good resting tone with biofeedback. She is only able to tolerate insertion of 1 digit into the pelvic floor for 5 min. After her visit, she reports discomfort for 3-4 hours. She has been able to tolerate active gentle exercises such as the Nu-step. She continues to have increased pain with palpation throughout the superficial and deep layers of the pelvic floor left greater than right. She did have penetrative intercourse with her  a few weeks ago resulting in pain for 2 days. She has many barriers for rehab which include numerous surgeries for endometriosis and previous attempts with PT. I expect slow and steady progress. She is motivated with PT. Due to her deficits she continues to be a good candidate for PT. INITIAL ASSESSMENT:  Ms. Sidra Galeazzi returns to therapy after having surgery with Dr. Manish Vega and botox with Dr. Mike Poon. She is now 3 weeks post op her endometrial surgery. She had previous Botox prior to surgery in the pelvic floor mm. . Her surgical and therapy history is very long and complex.   She has had multiple surgery's and multiple encoutners with pelvic floor rehabilitation. Today in the clinic, I was only able to palpate at the introitus and the first layer of pelvic floor mm. Pt reported significant pain with palpation at the vaginal opening, labia majora, and abdomen. Therefore, I suggested we wait two weeks until her next visit. She was agreeable with POC. Pt is an excellent candidate for pelvic floor therapy due to her limitations with sitting, standing, penetrative intercourse, PMH, and significant level of pain. Pt would benefit from skilled PT. PROBLEM LIST (Impacting functional limitations):  1. Decreased Strength  2. Decreased ADL/Functional Activities  3. Increased Pain  4. Decreased Flexibility/Joint Mobility  5. Decreased [place] with Home Exercise Program INTERVENTIONS PLANNED:  1. Neuromuscular re-education  2. Biofeedback as needed  3. HEP  4. Bladder retraining  5. Bladder education  6. Electrical Stimulation  7. Home Exercise Program (HEP)  8. Manual Therapy  9. Range of Motion (ROM)  10. Therapeutic Exercise/Strengthening  11. Ultrasound (US)  12. low level light therapy   TREATMENT PLAN:  Effective Dates: 11/30/2018 TO 2/28/2019 (90 days). Frequency/Duration: 2 times a week for 90 Days  GOALS: (Goals have been discussed and agreed upon with patient.)  Short-Term Functional Goals: Time Frame: in 4 weeks  1. Patient will demonstrate independence with home exercise program.  Discharge Goals: Time Frame:in 12 weeks  1. Pt able to tolerate size 2 Amielle dilator for 8 min in order to tolerate vaginal penetration. 2.   Pt report pain with palpation on the left side of the pelivc floor less than 2/10. 3.  Pt will tolerate 30 min of aerobic activity without increased pelvic pain. Rehabilitation Potential For Stated Goals: Fair  Regarding Link Rojas's therapy, I certify that the treatment plan above will be carried out by a therapist or under their direction.   Thank you for this referral,  Jerrod Marquez, PT     Referring Physician Signature: Kiah Mota MD              Date                    The information in this section was collected on 3/15/2019  (except where otherwise noted). HISTORY:   History of Present Injury/Illness (Reason for Referral):  Dx at 13 with endometriosis. That was when she had her first laprascropy. Has had 4 laparoscopy. First vaginal delivery 2009. Second child was born 2010. Had a vaginal delivery. Did do an ablation  which lasted for about one year. 2014 had a hysterectomy. Got sick after surgery. 8 weeks later had a vaginal dehiscence. Was septic then. Did not rebuild the cuff. The endo starts to attach the vaginal cuff. Started seeing Nisha Tate for pelvic floor therapy. Liliya Kent is having difficulty with palpation. Nov 2016 had a excision surgery for endometriosis. They build up the cuff. They tacked the left ovary. Eventually took out the left ovary in 2017 and found endo in the pelvis. In 2017, was dx with fibro. Wendie Mullen tried to ablation of the ilioinguinal nerve. Had necrosis around the hip bone. Recently had Botox with . Dr Lorie Young had a cytoscopy and the bladder was healthy. Went to see Dr. Bonnie Nuñez to  redo surgery in the pelvis for possible endo. Deadend nerve and it feels better. Vaginal valium in the past was helpful. Needling does help pain, soft tissue, and deep tissues. Unable to stand for any period of time. Does better with movement. Had tailbone pain for a while and does use cushions. [facility]els burring at the [facility]. Past Medical History/Comorbidities:   Ms. Reggie Bello  has a past medical history of Anxiety (9/6/2013), Bladder pain, Chronic pain, Endometriosis, Gluten intolerance, High-tone pelvic floor dysfunction, IBS (irritable bowel syndrome), Migraine, Nausea & vomiting, Ovarian cyst, PUD (peptic ulcer disease) (12/2013 ), Unspecified adverse effect of anesthesia, UTI (urinary tract infection), and Vaginal cuff dehiscence.   Ms. Reggie Bello  has a past surgical history that includes pr endometrial cryoablation; hx other surgical; hx bilateral salpingectomy; hx other surgical (11/2016); hx total laparoscopic hysterectomy; hx breast augmentation (2003); hx gyn (2000, 2004); hx dilation and curettage; hx pelvic laparoscopy (11/03/2016); hx oophorectomy (Left); hx other surgical (10/30/2018); and hx other surgical (11/06/2018). Social History/Living Environment:    Has 3 children, works during the day at [employer]  Prior Level of Function/Work/Activity:  Has been having pain for many years. Previous Treatment Approaches:          Pelvic floor therapy. Ambulatory/Rehab Services H2 Model Falls Risk Assessment    Risk Factors:       No Risk Factors Identified Ability to Rise from Chair:       (0)  Ability to rise in a single movement    Falls Prevention Plan:       No modifications necessary   Total: (5 or greater = High Risk): 0    ©2010 [facility] [employer]. All Rights Reserved. [facility] Patent #[id number]. Federal Law prohibits the replication, distribution or use without written permission from [facility] [employer]     Current Medications:    Current Outpatient Medications:     benzonatate (TESSALON) 100 mg capsule, , Disp: , Rfl: 0    pregabalin (LYRICA) 75 mg capsule, Take 75 mg by mouth two (2) times a day., Disp: , Rfl:     sertraline (ZOLOFT) 100 mg tablet, Take 150 mg by mouth nightly., Disp: , Rfl:     diazePAM (VALIUM) 10 mg tablet, 10 mg.  Vaginal suppository--uses every other night, Disp: , Rfl:     omega 3-DHA-EPA-fish oil 1,000 mg (120 mg-180 mg) capsule, Take 1 Cap by mouth., Disp: , Rfl:     melatonin 5 mg cap capsule, Take 10 mg by mouth nightly., Disp: , Rfl:     estradiol (ESTRACE) 1 mg tablet, TK 1 T PO QHS, Disp: , Rfl: 11    methen-sod phos-meth blue-hyos (UROGESIC-BLUE) 81.6-40.8-0.12 mg tab, May take 1-3 tabs per day as needed for bladder pain, Disp: 60 Tab, Rfl: 2    methocarbamol (ROBAXIN) 500 mg tablet, Take 500 mg by mouth two (2) times a day., Disp: , Rfl:     UBIDECARENONE/VITAMIN E MIXED (COQ10  PO), Take  by mouth nightly., Disp: , Rfl:     VIT B2/NIACIN/B6/B12/DEXPANTH (B COMPLEX SL), by SubLINGual route daily. , Disp: , Rfl:     traMADol (ULTRAM) 50 mg tablet, Take 1-2 Tabs by mouth every six (6) hours as needed for Pain. Max Daily Amount: 400 mg., Disp: 60 Tab, Rfl: 0    clonazePAM (KLONOPIN) 0.5 mg tablet, Take  by mouth nightly as needed. , Disp: , Rfl:     promethazine (PHENERGAN) 25 mg tablet, Take 25 mg by mouth every six (6) hours as needed for Nausea., Disp: , Rfl:     ibuprofen (MOTRIN) 800 mg tablet, Take 1 Tab by mouth every six (6) hours as needed. (Patient taking differently: Take 800 mg by mouth every six (6) hours as needed. Last dose 12/17/17), Disp: 30 Tab, Rfl: 0    magnesium oxide (MAG-OX) 400 mg tablet, Take 400 mg by mouth daily. , Disp: , Rfl:     loratadine (CLARITIN) 10 mg tablet, Take 10 mg by mouth every evening., Disp: , Rfl:     LACTOBACILLUS ACIDOPHILUS (PROBIOTIC PO), Take  by mouth., Disp: , Rfl:     multivitamin (ONE A DAY) tablet, Take 1 Tab by mouth daily. , Disp: , Rfl:    Date Last Reviewed:  3/15/2019   Voiding Patterns:  Patient voids every 4 hours during the day and 1 times during the night. Patient reports that she does not empties bladder. Does not leak. Fluid Intake:  Patient drinks 1 gallon of water per day. She consumes 1 cups of caffeinated beverages per day. Patient not limit fluid intake to control bladder. Bowel Habits:  Patient demonstrates constipation. Probiotic, citricel, linzes, mirilax, smooth move. Bowel movements are soft  Personal / Social History:  · Sexually active? Yes, but very painful       Number of Personal Factors/Comorbidities that affect the Plan of Care: 3+: HIGH COMPLEXITY   EXAMINATION:   External Observation:     · Anal [place]: present  · Skin Integrity: WNL  · Q-tip Test: Painful  Palpation:       2/08. 2019     Right Left   Bulbocavernosus Pain 4/10 Pain 4/10   Ischocavernosus     Superficial Transverse Perineal     Sphincter Uhrovaginal     Compressor Urethra      Obturator Internus Pain 4/10 Pain 5/10   Iliococcygeus Pain 4/10 Pain 4/10   Coccygeus     Pubococcygeus Pain 4/10 Pain 4/10   Levator Ani     Adductor tenderness Tenderness    Glut medius  Tenderness at medial insersion                   SEMG evaluation:   Date: 1/28/2019    Resting Tone: starting at 5 uV and ending after PT 0.5   Quality of Resting Tone: good after manual therapy   5 Second Hold: 5 uV   10 Second Hold: NT uV   Quality of Recruitment: Good    Quality of Relaxation: Good    Quality of Holding: Fair    Stability of Hold: Fair    Stability of Rest: good after manual      Body Structures Involved:  1. Muscles Body Functions Affected:  1. Genitourinary  2. Neuromusculoskeletal Activities and Participation Affected:  1. Mobility  2. Self Care  3. Interpersonal Interactions and Relationships   Number of elements that affect the Plan of Care: 4+: HIGH COMPLEXITY   CLINICAL PRESENTATION:   Presentation: Evolving clinical presentation with unstable and unpredictable characteristics: HIGH COMPLEXITY   CLINICAL DECISION MAKING:   Outcome Measure: Tool Used: Pelvic Floor Disability Index (PFDI-20)  Score:  Initial: 56 Most Recent: X (Date: -- )   Interpretation of Score: This survey asks questions concerning certain  bowel, bladder, or pelvic symptoms and how much this symptoms interfere with daily activiites. Each section is scored on a 0-4 scale, 4 representing the greatest disability. The scores of each section are added together for a total score out of 70. Score 0 1-13 14-27 28-41 42-55 56-69 70   Modifier CH CI CJ CK CL CM CN     Medical Necessity:   · Patient is expected to demonstrate progress in coordination to decrease assistance required with pelvic floor drops. Reason for Services/Other Comments:  · Patient continues to require skilled intervention due to pelvic pain.    Use of outcome tool(s) and clinical judgement create a POC that gives a: Difficult prediction of patient's progress: HIGH COMPLEXITY   TREATMENT:   (In addition to Assessment/Re-Assessment sessions the following treatments were rendered)    Pre-treatment Symptoms/Complaints: Pt states she was very busy yesterday and today is exausted. Has not had intercourse or tried dilators this week due to time and  out of town. Pain: Initial:   Pain Intensity 1: 5  Pain Location 1: Pelvis  Post Session:  6/10     THERAPEUTIC EXERCISE: (3 minutes):  Exercises per grid below to improve coordination. Required minimal visual and tactile cues to promote proper body breathing techniques. Progressed complexity of movement as indicated. MANUAL THERAPY: (53 minutes): Soft tissue mobilization was utilized and necessary because of the patient's painful spasm and restricted motion of soft tissue. (Used abbreviations: MET - muscle energy technique; SCS- Strain counter strain; CTM- Connective tissue mobilizations; CR- Contract/relax; SP- Sustained pressure, TrP- Trigger point release, IASTM- Instrument assisted soft tissue mobilizations, TDN- Trigger point dry needling). CTM to bilateral adductors with rolling and strumming. Nerve flossing to pudendal n. SCS and Trigger point to bilateral deep transverse perineum and superficial transverse, levator ani and oI  perineum to reduce tone and improve tissue elasticity.   CTM to sacrotuberous ligament, piriformis, and multifidus in prone   Palpation into pelvic floor 8min total time     Exercises:  Patient instructed in pelvic floor exercises listed below:   Date:  3/15/2019    Activity/Exercise    PF drops and relaxation techniques  in supine with biofeedback assist for visual feedback via external anal sensors      Nu step    Left external rotation stretch    Adductor stetch    Cat cow    rena pose    rena pose hands to the right and left    Bridge    Clam shells    Hip extension    Sidelying Hip Extension in Abduction   Swimming -  Quadruped Alternating Leg Ext  Quadruped Hip Extension Kicks     Prone heel squeeze    Multifidus activiation 3 min focus on activiaton      [employer] Portal    The following educational topics were reviewed with patient:   Treatment/Session Assessment:   Pt reported slight increase in symptoms after manual therapy. Overall, pelvic floor tissues had improved tone. Will continue to encourage exercise at home. · Response to Treatment: updated HEP. · Compliance with Program/Exercises: Will assess as treatment progresses. · Recommendations/Intent for next treatment session: \"Next visit will focus on advancements to more challenging activities\".   Total Treatment Duration:  PT Patient Time In/Time Out  Time In: 0957  Time Out: [address], PT  Future Appointments   Date Time Provider Jose Cruz Maier   3/22/2019 10:00 AM Sheldon Form, PT [facility]   3/29/2019 10:00 AM Sheldon Form, PT [facility]   4/4/2019 10:00 AM Sheldon Form, PT [facility]   4/11/2019 10:00 AM Sheldon Form, PT [facility]   4/25/2019  1:00 PM Sheldon Form, PT [facility]   5/6/2019  7:00 PM Sheldon Form, PT [facility]   5/17/2019 10:00 AM Sheldon Form, PT [facility]   5/23/2019  8:00 AM Sheldon Form, PT [facility]   5/28/2019 11:00 AM Sheldon Form, PT [facility]

## 2019-03-22 ENCOUNTER — HOSPITAL ENCOUNTER (OUTPATIENT)
Dept: PHYSICAL THERAPY | Age: 34
Discharge: HOME | End: 2019-03-22
Payer: COMMERCIAL

## 2019-03-22 PROCEDURE — 97140 MANUAL THERAPY 1/> REGIONS: CPT

## 2019-03-29 ENCOUNTER — HOSPITAL ENCOUNTER (OUTPATIENT)
Dept: PHYSICAL THERAPY | Age: 34
Discharge: HOME | End: 2019-03-29
Payer: COMMERCIAL

## 2019-03-29 PROCEDURE — 97140 MANUAL THERAPY 1/> REGIONS: CPT

## 2019-03-29 PROCEDURE — 97110 THERAPEUTIC EXERCISES: CPT

## 2019-03-29 NOTE — PROGRESS NOTES
Ele Rojas  : 1985  Primary: Dominik Mckenzie  Secondary:  [address]  at [facility]  [address] [facility], [address] [facility] [address], [place]  [phone]   [phone]       Salazar Patiño  : 1985  Primary: Dominik Mckenzie  Secondary:  [address]  at [facility]  [address] [facility], [address] [facility] [address], [place]  [phone]   [phone]         OUTPATIENT PHYSICAL THERAPY: Daily Treatment Note 3/29/2019  MEDICAL/REFERRING DIAGNOSIS:  Pelvic and perineal pain [R10.2]   REFERRING PHYSICIAN: Vinicius Vallejo MD  Pre-treatment Symptoms/Complaints:  Pt states that she did do her dilator this week. Able to do 7 min. Pain: Initial: Pain Intensity 1: 4  Pain Location 1: Pelvis 6/10 Post Session:  6//10   Medications Last Reviewed:  3/29/2019  Updated Objective Findings:  None Today   TREATMENT:   THERAPEUTIC EXERCISE: (8 minutes):  Exercises per grid below to improve strength and coordination. Required moderate verbal and tactile cues to promote proper body breathing techniques. Progressed repetitions and complexity of movement as indicated. MANUAL THERAPY: (45 minutes): Soft tissue mobilization was utilized and necessary because of the patient's painful spasm and restricted motion of soft tissue. (Used abbreviations: MET - muscle energy technique; SCS- Strain counter strain; CTM- Connective tissue mobilizations; CR- Contract/relax; SP- Sustained pressure, TrP- Trigger point release, IASTM- Instrument assisted soft tissue mobilizations, TDN- Trigger point dry needling). CTM to bilateral adductors with rolling and strumming. Nerve flossing to pudendal n. SCS and Trigger point to bilateral deep transverse perineum and superficial transverse, levator ani and oI  perineum to reduce tone and improve tissue elasticity.   CTM to sacrotuberous ligament, piriformis, and multifidus in prone   Palpation into pelvic floor 7 min total time      Exercises:  Patient instructed in pelvic floor exercises listed below:    Date:  2/22/2019    Activity/Exercise     Prone extension X 10    Multifidus  5 min in prone   Left external rotation stretch     Adductor stetch    Cat cow    rena pose    rena pose hands to the right and left        [employer] Portal  The following educational topics were reviewed with patient:  None today. Treatment/Session Summary:    · Response to Treatment:  Pt continues to make slow progress. Instructed to consider orthopedic PT to address connective tissue dysfunction and hip laxity. .  · Communication/Consultation:  None today  · Equipment provided today:  None today  · Recommendations/Intent for next treatment session: Next visit will focus on increase dilator if possible. Treatment Plan of Care Effective Dates:  2/8/2019 to 5/6/2019  Total Treatment Billable Duration:  53 minutes  4 min don and doff clothes.    PT Patient Time In/Time Out  Time In: 1000  Time Out: [address] [facility] [address], PT    Future Appointments   Date Time Provider Jose Cruz Maier   4/4/2019 10:00 AM Carolina Slocumb, PT [facility]   4/11/2019 10:00 AM Carolina Slocumb, PT [facility]   4/25/2019  1:00 PM Carolina Slocumb, PT [facility]   5/6/2019  7:00 PM Carolina Slocumb, PT [facility]   5/10/2019 12:30 PM Carolina Slocumb, PT [facility]   5/17/2019 10:00 AM Carolina Slocumb, PT [facility]   5/23/2019  8:00 AM Carolina Slocumb, PT [facility]   5/28/2019 11:00 AM Carolina Slocumb, PT [facility]

## 2019-04-04 ENCOUNTER — HOSPITAL ENCOUNTER (OUTPATIENT)
Dept: PHYSICAL THERAPY | Age: 34
Discharge: HOME | End: 2019-04-04
Payer: COMMERCIAL

## 2019-04-04 PROCEDURE — 97140 MANUAL THERAPY 1/> REGIONS: CPT

## 2019-04-10 NOTE — PROGRESS NOTES
Rosemary Rojas  : 1985  Primary: Donnell Molina [facility]  Secondary:  [address]  at [facility]  [address] [facility] [address] [facility] [address], [place]  [phone]   [phone]       Jagjit Hernandes  : 1985  Primary: Donnell Mol[employer]  Secondary:  [address]  at [facility]  [address] [facility], [address] [facility] [address], [place]  [phone]   [phone]         OUTPATIENT PHYSICAL THERAPY: Daily Treatment Note 2019  MEDICAL/REFERRING DIAGNOSIS:  Pelvic and perineal pain [R10.2]   REFERRING PHYSICIAN: Raffy Hernandes MD  Pre-treatment Symptoms/Complaints:  Pt states that she fractured her wrist when falling off a later. She has been taking increased medication for pain and has been having constipation. She sees the pain doctor today after this appointment. Can't sit for long periods of time and can't stand for long periods of time. Pain: Initial:   5-6 pelvic pain, wrist pain and finger pain = /10. Post Session:  6//10   Medications Last Reviewed:  2019  Updated Objective Findings:  None Today   TREATMENT:   THERAPEUTIC EXERCISE: (0 minutes):  Exercises per grid below to improve strength and coordination. Required moderate verbal and tactile cues to promote proper body breathing techniques. Progressed repetitions and complexity of movement as indicated. MANUAL THERAPY: (55  minutes): Soft tissue mobilization was utilized and necessary because of the patient's painful spasm and restricted motion of soft tissue. (Used abbreviations: MET - muscle energy technique; SCS- Strain counter strain; CTM- Connective tissue mobilizations; CR- Contract/relax; SP- Sustained pressure, TrP- Trigger point release, IASTM- Instrument assisted soft tissue mobilizations, TDN- Trigger point dry needling). CTM to bilateral adductors with rolling and strumming. Nerve flossing to pudendal n.    SCS and Trigger point to bilateral deep transverse syncope perineum and superficial transverse, levator ani and oI  perineum to reduce tone and improve tissue elasticity. CTM to sacrotuberous ligament, piriformis, and multifidus in prone   Palpation into pelvic floor 5 min total time   obturator internus via external palpation TrP     Exercises:  Patient instructed in pelvic floor exercises listed below:    Date:  2/22/2019    Activity/Exercise     Prone extension    Multifidus     Left external rotation stretch     Adductor stetch    Cat cow    rena pose    rena pose hands to the right and left        [employer] Portal  The following educational topics were reviewed with patient:  None today. Treatment/Session Summary:  No exercises today as patient was in a cast. Pt seeing pain management today. · Response to Treatment: Pt states  · Communication/Consultation:  None today  · Equipment provided today:  None today  · Recommendations/Intent for next treatment session: Next visit will focus on increase dilator if possible. Treatment Plan of Care Effective Dates:  2/8/2019 to 5/6/2019  Total Treatment Billable Duration:  55 minutes  4 min don and doff clothes.       [facility], PT    Future Appointments   Date Time Provider Jose Cruz Maier   4/11/2019 10:00 AM Kameron Beam, PT [facility]   4/25/2019  1:00 PM Kameron Beam, PT [facility]   5/6/2019  7:00 PM Kameron Beam, PT [facility]   5/10/2019 12:30 PM Kameron Beam, PT [facility]   5/17/2019 10:00 AM Kameron Beam, PT [facility]   5/23/2019  8:00 AM Kameron Beam, PT [facility]   5/28/2019 11:00 AM Kameron Beam, PT [facility]

## 2019-04-11 ENCOUNTER — HOSPITAL ENCOUNTER (OUTPATIENT)
Dept: PHYSICAL THERAPY | Age: 34
Discharge: HOME | End: 2019-04-11
Payer: COMMERCIAL

## 2019-04-11 PROCEDURE — 97140 MANUAL THERAPY 1/> REGIONS: CPT

## 2019-04-11 NOTE — PROGRESS NOTES
Jas Rojas  : 1985  Primary: [place] [facility]  Secondary:  [address]  at [facility]  [address] [facility], [address] [place] [address] [address], [address]  [phone]   [phone]       Melissa Duran  : 1985  Primary: [facility]  Secondary:  [address]  at [facility]  [address] [facility], [address] [place] [address] [address]  [phone]   [phone]         OUTPATIENT PHYSICAL THERAPY: Daily Treatment Note 2019  MEDICAL/REFERRING DIAGNOSIS:  Pelvic and perineal pain [R10.2]   REFERRING PHYSICIAN: Radha Cohen MD  Pre-treatment Symptoms/Complaints:  Pt states that she feels about the same. Did have intercourse and it is still painful. Pain: Initial: Pain Intensity 1: 4  Pain Location 1: Pelvis  Post Session:  2/10  Improved hip and pelvic pain    Medications Last Reviewed:  2019  Updated Objective Findings:  None Today   TREATMENT:   THERAPEUTIC EXERCISE: (0 minutes):  Exercises per grid below to improve strength and coordination. Required moderate verbal and tactile cues to promote proper body breathing techniques. Progressed repetitions and complexity of movement as indicated. MANUAL THERAPY: (55  minutes): Soft tissue mobilization was utilized and necessary because of the patient's painful spasm and restricted motion of soft tissue. (Used abbreviations: MET - muscle energy technique; SCS- Strain counter strain; CTM- Connective tissue mobilizations; CR- Contract/relax; SP- Sustained pressure, TrP- Trigger point release, IASTM- Instrument assisted soft tissue mobilizations, TDN- Trigger point dry needling). CTM to bilateral adductors with rolling and strumming. Nerve flossing to pudendal n. SCS and Trigger point to bilateral deep transverse perineum and superficial transverse, levator ani and oI  perineum to reduce tone and improve tissue elasticity.  (not performed)  CTM to sacrotuberous ligament, piriformis, and multifidus in prone   Palpation into pelvic floor 5 min total time   obturator internus via external palpation TrP  Lumbar trunk  rotation overpressure B     Exercises:  Patient instructed in pelvic floor exercises listed below:    Date:  2/22/2019    Activity/Exercise     Prone extension    Multifidus     Left external rotation stretch     Adductor stetch    Cat cow    rena pose    rena pose hands to the right and left        [employer] Portal  The following educational topics were reviewed with patient:  None today. Treatment/Session Summary:  No exercises today as patient was in a cast. Pt felt improvement after treatment today. Will return to internal palpation next visit if tolerated. · Response to Treatment: Pt states  · Communication/Consultation:  None today  · Equipment provided today:  None today  · Recommendations/Intent for next treatment session: Next visit will focus on increase dilator if possible. Treatment Plan of Care Effective Dates:  2/8/2019 to 5/6/2019  Total Treatment Billable Duration:  55 minutes  4 min don and doff clothes.    PT Patient Time In/Time Out  Time In: 1000  Time Out: [address] [facility], PT    Future Appointments   Date Time Provider Jose Cruz Maier   4/25/2019  1:00 PM Alcira Mandujano PT [facility]   5/6/2019  7:00 PM Alcira Mandujano PT [facility]   5/10/2019 12:30 PM Alcira Mandujano PT [facility]   5/17/2019 10:00 AM Alcira Mandujano PT [facility]   5/23/2019  8:00 AM Alcira Mandujano PT [facility]   5/28/2019 11:00 AM Alcira Mandujano PT [facility] [place]

## 2019-04-25 ENCOUNTER — HOSPITAL ENCOUNTER (OUTPATIENT)
Dept: PHYSICAL THERAPY | Age: 34
Discharge: HOME | End: 2019-04-25
Payer: COMMERCIAL

## 2019-04-25 PROCEDURE — 97140 MANUAL THERAPY 1/> REGIONS: CPT

## 2019-04-25 NOTE — PROGRESS NOTES
Gi Rojas  : 1985  Primary: [facility]  Secondary:  [address] at [place]  [address] [facility] [address] , [address], [address] [facility]  [phone]   [phone]       Johanna Johnston  : 1985  Primary: [facility]  Secondary:  [address]  at [place]  [address] [facility] , [address], [address] [facility]  [phone]   [phone]         OUTPATIENT PHYSICAL THERAPY: Daily Treatment Note 2019  MEDICAL/REFERRING DIAGNOSIS:  Pelvic and perineal pain [R10.2]   REFERRING PHYSICIAN: Lawanda Russo MD  Pre-treatment Symptoms/Complaints:  Pt saw Dr. Clara Ngo yesterday. Candance Huger She will be getting botox, n. blocks, and trigger point injections. This will occur on May 20th. Also she had an x ray of the coccyx last week. Will find out results next Monday. Still having hip, low back, and anterior pelvic paoin. Pain: Initial:   6/10  Feels west  after exam from Dr. Clara Ngo yesterday. Post Session:  5/10  Improved hip and pelvic pain    Medications Last Reviewed:  2019  Updated Objective Findings:  None Today   TREATMENT:   THERAPEUTIC EXERCISE: 3 minutes):  Exercises per grid below to improve strength and coordination. Required moderate verbal and tactile cues to promote proper body breathing techniques. Progressed repetitions and complexity of movement as indicated. MANUAL THERAPY: (55  minutes): Soft tissue mobilization was utilized and necessary because of the patient's painful spasm and restricted motion of soft tissue. (Used abbreviations: MET - muscle energy technique; SCS- Strain counter strain; CTM- Connective tissue mobilizations; CR- Contract/relax; SP- Sustained pressure, TrP- Trigger point release, IASTM- Instrument assisted soft tissue mobilizations, TDN- Trigger point dry needling). CTM to bilateral adductors with rolling and strumming. Nerve flossing to pudendal n.    SCS and Trigger point to bilateral deep transverse perineum and superficial transverse, levator ani and oI  perineum to reduce tone and improve tissue elasticity. CTM to sacrotuberous ligament, piriformis, and multifidus in prone   Palpation into pelvic floor 5 min total time   obturator internus via external palpation TrP  Lumbar trunk  rotation overpressure B (not performed)     Exercises:  Patient instructed in pelvic floor exercises listed below:    Date:  4/25/2019   Activity/Exercise     Prone extension    Multifidus     Left external rotation stretch     Adductor stetch    Cat cow    rena pose 2 x 60 sec    rena pose hands to the right and left        [facility] Portal  The following educational topics were reviewed with patient:  None today. Treatment/Session Summary:   · Response to Treatment: Pt was able to tolerate palpation into the vaginal canal today. Minimal pain at the introitus. Was unable to palpate to levator ani today due to soreness from yesterday. · Communication/Consultation:  None today  · Equipment provided today:  None today  · Recommendations/Intent for next treatment session: Next visit will focus on increase dilator if possible.     Treatment Plan of Care Effective Dates:  2/8/2019 to 5/6/2019  Total Treatment Billable Duration:  58 minutes   PT Patient Time In/Time Out  Time In: 1300  Time Out: 601 [facility], PT    Future Appointments   Date Time Provider Jose Cruz Maier   5/6/2019  7:00 PM Farhad Bustos PT [facility]   5/10/2019 12:30 PM Farhad Bustos PT [facility]   5/17/2019 10:00 AM MALIA Ramirez [facility]   5/23/2019  8:00 AM MALIA Ramirez   5/28/2019 11:00 AM Farhad Bustos PT [facility]

## 2019-05-10 ENCOUNTER — HOSPITAL ENCOUNTER (OUTPATIENT)
Dept: PHYSICAL THERAPY | Age: 34
Discharge: HOME | End: 2019-05-10
Payer: COMMERCIAL

## 2019-05-10 PROCEDURE — 97140 MANUAL THERAPY 1/> REGIONS: CPT

## 2019-05-10 NOTE — PROGRESS NOTES
Nirmaljesus Rojas  : 1985  [facility]: [facility]  Secondary:  [address]  at [facility]  [address] [facility], [facility] [address]  [phone]   [phone]       Brian Mancini  : 1985  Primary: [facility]  Secondary:  [address]  at [facility]  [address] [facility], [address] [facility] [address], [address]  [phone]   [phone]         OUTPATIENT PHYSICAL THERAPY: Daily Treatment Note 5/10/2019  MEDICAL/REFERRING DIAGNOSIS:  Pelvic and perineal pain [R10.2]   REFERRING PHYSICIAN: Cherie Clark MD  Pre-treatment Symptoms/Complaints:  Pt states that she got the results of her XRay and there was an artifact found in the left lower quadrant. She was also diagnosed with a sacral fracture. She has used her dilators 2 x this week. She feels like she can tolerate the smallest easiliy. Pain: Initial: Pain Intensity 1: 4 6/10  Feels west  after exam from Dr. Anyi Pimentel yesterday. Post Session:  5/10  Improved hip and pelvic pain    Medications Last Reviewed:  5/10/2019  Updated Objective Findings:  None Today   TREATMENT:   THERAPEUTIC EXERCISE: (3  minutes):  Exercises per grid below to improve strength and coordination. Required moderate verbal and tactile cues to promote proper body breathing techniques. Progressed repetitions and complexity of movement as indicated. MANUAL THERAPY: (55  minutes): Soft tissue mobilization was utilized and necessary because of the patient's painful spasm and restricted motion of soft tissue. (Used abbreviations: MET - muscle energy technique; SCS- Strain counter strain; CTM- Connective tissue mobilizations; CR- Contract/relax; SP- Sustained pressure, TrP- Trigger point release, IASTM- Instrument assisted soft tissue mobilizations, TDN- Trigger point dry needling). CTM to bilateral adductors with rolling and strumming. Nerve flossing to pudendal n.    SCS and Trigger point to bilateral deep transverse perineum and superficial transverse, levator ani and oI  perineum to reduce tone and improve tissue elasticity. CTM to sacrotuberous ligament, piriformis, and multifidus in prone   Palpation into pelvic floor 7 min total time   obturator internus via external palpation TrP  Lumbar trunk  rotation overpressure B (not performed)     Exercises:  Patient instructed in pelvic floor exercises listed below:    Date:  5/10/2019   Activity/Exercise     Prone extension    Multifidus     Left external rotation stretch     Adductor stetch    Cat cow    rena pose    Hip flexor stretch X 3 B standing   rena pose hands to the right and left        [employer] Portal  The following educational topics were reviewed with patient:  None today. Treatment/Session Summary:   · Response to Treatment: Pt was able to tolerate palpation into the vaginal canal today. Minimal pain at the introitus. Was unable to palpate to levator ani today due to soreness from yesterday. · Communication/Consultation:  None today  · Equipment provided today:  None today  · Recommendations/Intent for next treatment session: Next visit will focus on increase dilator if possible.     Treatment Plan of Care Effective Dates:  5/10/2019 to 8/8/2019   Total Treatment Billable Duration:  58 minutes   PT Patient Time In/Time Out  Time In: 1230  Time Out: [facility],     Future Appointments   Date Time Provider Jose Cruz Maier   5/17/2019 10:00 AM Isacc Stains, PT [facility]   5/23/2019  8:00 AM Isacc Stains, PT [facility]   5/28/2019 11:00 AM Isacc Stains, PT [facility]   6/3/2019 11:00 AM Isacc Stains, PT [facility]   6/12/2019  1:00 PM Isacc Stains, PT [facility]   6/24/2019  1:00 PM Isacc Stains, PT [facility]

## 2019-05-10 NOTE — THERAPY RECERTIFICATION
Olya Rojas  : 1985  Primary: [facility]  Secondary:  [facility]rapy Center at [facility], [address] [address], [address] [facility]  [phone]   [phone]          OUTPATIENT PHYSICAL THERAPY:Daily Note and Recertification 7587    ICD-10: Treatment Diagnosis: Myalgia (M79.1)Fibromyalgia (M79.7)Other lack of coordination (R27.8)   Precautions/Allergies:   Codeine; Dermagraft [cult skin subst, human-bovine]; and Percocet [oxycodone-acetaminophen]   MD Orders: Eval and treat MEDICAL/REFERRING DIAGNOSIS:  Pelvic and perineal pain [R10.2]   DATE OF ONSET: Chronic  REFERRING PHYSICIAN: Alfa Alford MD  RETURN PHYSICIAN APPOINTMENT: TBD     Re-Evaluation  5/10/2019  Ms. Jennifer Sethi has been to physical therapy since 2019 for 20 visits. She continues to progress slowly but steadily. She is now able to tolerate palpation into the pelvic floor for up to 8 min with 1 digit. We are now able to palpate bilateral levator ani and obturator internus. She is able to participate in penetrative intercourse, but continues to have pain. She has been instructed to attempt dilator insertion to and add friction if able to do so. She recently was advised to consider a x-ray of her coccyx which reveal a posterior inferior sacral fracture and some type of artifact in the left abdominal region. She will be sending her images to her Dr. Elmo Paget in [place]. She also saw a URO/GYN in [facility] and will be having Botox, n. Block, and Due to her deficits she continues to be a good candidate for PT. Continue PT 1 x week. Re-Evaluation  2019  Ms. Rojas has been to therapy for 10 visits one time per week. She has made minimal gains with PT. She presents with good resting tone with biofeedback. She is only able to tolerate insertion of 1 digit into the pelvic floor for 5 min. After her visit, she reports discomfort for 3-4 hours.  She has been able to tolerate active gentle exercises such as the Nu-step. She continues to have increased pain with palpation throughout the superficial and deep layers of the pelvic floor left greater than right. She did have penetrative intercourse with her  a few weeks ago resulting in pain for 2 days. She has many barriers for rehab which include numerous surgeries for endometriosis and previous attempts with PT. I expect slow and steady progress. She is motivated with PT. Due to her deficits she continues to be a good candidate for PT. INITIAL ASSESSMENT:  Ms. Anyi Kiran returns to therapy after having surgery with Dr. Rohan Morris and botox with Dr. Caryl Seaman. She is now 3 weeks post op her endometrial surgery. She had previous Botox prior to surgery in the pelvic floor mm. . Her surgical and therapy history is very long and complex. She has had multiple surgery's and multiple encoutners with pelvic floor rehabilitation. Today in the clinic, I was only able to palpate at the introitus and the first layer of pelvic floor mm. Pt reported significant pain with palpation at the vaginal opening, labia majora, and abdomen. Therefore, I suggested we wait two weeks until her next visit. She was agreeable with POC. Pt is an excellent candidate for pelvic floor therapy due to her limitations with sitting, standing, penetrative intercourse, PMH, and significant level of pain. Pt would benefit from skilled PT. PROBLEM LIST (Impacting functional limitations):  1. Decreased Strength  2. Decreased ADL/Functional Activities  3. Increased Pain  4. Decreased Flexibility/Joint Mobility  5. Decreased [place] with Home Exercise Program INTERVENTIONS PLANNED:  1. Neuromuscular re-education  2. Biofeedback as needed  3. HEP  4. Bladder retraining  5. Bladder education  6. Electrical Stimulation  7. Home Exercise Program (HEP)  8. Manual Therapy  9. Range of Motion (ROM)  10. Therapeutic Exercise/Strengthening  11.  Ultrasound (US)  12. low level light therapy   TREATMENT PLAN:  Effective Dates:2/8/2019 to 5/6/2019 (90 days). Frequency/Duration: 2 times a week for 90 Days  GOALS: (Goals have been discussed and agreed upon with patient.)  Short-Term Functional Goals: Time Frame: in 4 weeks  1. Patient will demonstrate independence with home exercise program.  Discharge Goals: Time Frame:in 12 weeks  1. Pt able to tolerate size 2 Amielle dilator for 8 min in order to tolerate vaginal penetration. 2.   Pt report pain with palpation on the left side of the pelivc floor less than 2/10. 3.  Pt will tolerate 30 min of aerobic activity without increased pelvic pain. Rehabilitation Potential For Stated Goals: Fair  Regarding Glenn Rojas's therapy, I certify that the treatment plan above will be carried out by a therapist or under their direction. Thank you for this referral,  Jose Enrique Craig, PT     Referring Physician Signature: Suzie Cote MD              Date                    The information in this section was collected on 5/10/2019  (except where otherwise noted). HISTORY:   History of Present Injury/Illness (Reason for Referral):  Dx at 13 with endometriosis. That was when she had her first laprascropy. Has had 4 laparoscopy. First vaginal delivery 2009. Second child was born 2010. Had a vaginal delivery. Did do an ablation  which lasted for about one year. 2014 had a hysterectomy. Got sick after surgery. 8 weeks later had a vaginal dehiscence. Was septic then. Did not rebuild the cuff. The endo starts to attach the vaginal cuff. Started seeing Billie Barraza for pelvic floor therapy. Medina Johnson is having difficulty with palpation. Nov 2016 had a excision surgery for endometriosis. They build up the cuff. They tacked the left ovary. Eventually took out the left ovary in 2017 and found endo in the pelvis. In 2017, was dx with fibro. Elenostephanie  tried to ablation of the ilioinguinal nerve. Had necrosis around the hip bone.  Recently had Botox with Dr. Dr Garay Sin had a cytoscopy and the bladder was healthy. Went to see Dr. Olivia Verde to  redo surgery in the pelvis for possible endo. Deadend nerve and it feels better. Vaginal valium in the past was helpful. Needling does help pain, soft tissue, and deep tissues. Unable to stand for any period of time. Does better with movement. Had tailbone pain for a while and does use cushions. Feels burring at the [facility]. Past Medical History/Comorbidities:   Ms. Lucero Talavera  has a past medical history of Anxiety (9/6/2013), Bladder pain, Chronic pain, Endometriosis, Gluten intolerance, High-tone pelvic floor dysfunction, IBS (irritable bowel syndrome), Migraine, Nausea & vomiting, Ovarian cyst, PUD (peptic ulcer disease) (12/2013 ), Unspecified adverse effect of anesthesia, UTI (urinary tract infection), and Vaginal cuff dehiscence. Ms. Lucero Talavera  has a past surgical history that includes pr endometrial cryoablation; hx other surgical; hx bilateral salpingectomy; hx other surgical (11/2016); hx total laparoscopic hysterectomy; hx breast augmentation (2003); hx gyn (2000, 2004); hx dilation and curettage; hx pelvic laparoscopy (11/03/2016); hx oophorectomy (Left); hx other surgical (10/30/2018); and hx other surgical (11/06/2018). Social History/Living Environment:    Has 3 children, works during the day at [employer]  Prior Level of Function/Work/Activity:  Has been having pain for many years. Previous Treatment Approaches:          Pelvic floor therapy. Ambulatory/Rehab Services H2 Model Falls Risk Assessment    Risk Factors:       No Risk Factors Identified Ability to Rise from Chair:       (0)  Ability to rise in a single movement    Falls Prevention Plan:       No modifications necessary   Total: (5 or greater = High Risk): 0    ©2010 [employer]. All Rights Reserved. [facility] Patent #[id number].  Federal Law prohibits the replication, distribution or use without written permission from [employer]     Current Medications:    Current Outpatient Medications:     benzonatate (TESSALON) 100 mg capsule, , Disp: , Rfl: 0    pregabalin (LYRICA) 75 mg capsule, Take 75 mg by mouth two (2) times a day., Disp: , Rfl:     sertraline (ZOLOFT) 100 mg tablet, Take 150 mg by mouth nightly., Disp: , Rfl:     diazePAM (VALIUM) 10 mg tablet, 10 mg. Vaginal suppository--uses every other night, Disp: , Rfl:     omega 3-DHA-EPA-fish oil 1,000 mg (120 mg-180 mg) capsule, Take 1 Cap by mouth., Disp: , Rfl:     melatonin 5 mg cap capsule, Take 10 mg by mouth nightly., Disp: , Rfl:     estradiol (ESTRACE) 1 mg tablet, TK 1 T PO QHS, Disp: , Rfl: 11    methen-sod phos-meth blue-hyos (UROGESIC-BLUE) 81.6-40.8-0.12 mg tab, May take 1-3 tabs per day as needed for bladder pain, Disp: 60 Tab, Rfl: 2    methocarbamol (ROBAXIN) 500 mg tablet, Take 500 mg by mouth two (2) times a day., Disp: , Rfl:     UBIDECARENONE/VITAMIN E MIXED (COQ10  PO), Take  by mouth nightly., Disp: , Rfl:     VIT B2/NIACIN/B6/B12/DEXPANTH (B COMPLEX SL), by SubLINGual route daily. , Disp: , Rfl:     traMADol (ULTRAM) 50 mg tablet, Take 1-2 Tabs by mouth every six (6) hours as needed for Pain. Max Daily Amount: 400 mg., Disp: 60 Tab, Rfl: 0    clonazePAM (KLONOPIN) 0.5 mg tablet, Take  by mouth nightly as needed. , Disp: , Rfl:     promethazine (PHENERGAN) 25 mg tablet, Take 25 mg by mouth every six (6) hours as needed for Nausea., Disp: , Rfl:     ibuprofen (MOTRIN) 800 mg tablet, Take 1 Tab by mouth every six (6) hours as needed. (Patient taking differently: Take 800 mg by mouth every six (6) hours as needed. Last dose 12/17/17), Disp: 30 Tab, Rfl: 0    magnesium oxide (MAG-OX) 400 mg tablet, Take 400 mg by mouth daily. , Disp: , Rfl:     loratadine (CLARITIN) 10 mg tablet, Take 10 mg by mouth every evening., Disp: , Rfl:     LACTOBACILLUS ACIDOPHILUS (PROBIOTIC PO), Take  by mouth., Disp: , Rfl:     multivitamin (ONE A DAY) tablet, Take 1 Tab by mouth daily. , Disp: , Rfl:    Date Last Reviewed:  5/10/2019   Voiding Patterns:  Patient voids every 4 hours during the day and 1 times during the night. Patient reports that she does not empties bladder. Does not leak. Fluid Intake:  Patient drinks 1 gallon of water per day. She consumes 1 cups of caffeinated beverages per day. Patient not limit fluid intake to control bladder. Bowel Habits:  Patient demonstrates constipation. Probiotic, citricel, linzes, mirilax, smooth move. Bowel movements are soft  Personal / Social History:  · Sexually active? Yes, but very painful       Number of Personal Factors/Comorbidities that affect the Plan of Care: 3+: HIGH COMPLEXITY   EXAMINATION:   External Observation:     · Anal [place]: present  · Skin Integrity: WNL  · Q-tip Test: Painful  Palpation:       5/10/2019       Right Left   Bulbocavernosus Pain 4/10 Pain 4/10   Ischocavernosus     Superficial Transverse Perineal     Sphincter Uhrovaginal     Compressor Urethra      Obturator Internus Pain 4/10 Pain 5/10   Iliococcygeus Pain 4/10 Pain 4/10   Coccygeus     Pubococcygeus Pain 4/10 Pain 4/10   Levator Ani     Adductor tenderness Tenderness    Glut medius  Tenderness at medial insersion                   SEMG evaluation:   Date: 1/28/2019    Resting Tone: starting at 5 uV and ending after PT 0.5   Quality of Resting Tone: good after manual therapy   5 Second Hold: 5 uV   10 Second Hold: NT uV   Quality of Recruitment: Good    Quality of Relaxation: Good    Quality of Holding: Fair    Stability of Hold: Fair    Stability of Rest: good after manual      Body Structures Involved:  1. Muscles Body Functions Affected:  1. Genitourinary  2. Neuromusculoskeletal Activities and Participation Affected:  1. Mobility  2. Self Care  3.  Interpersonal Interactions and Relationships   Number of elements that affect the Plan of Care: 4+: HIGH COMPLEXITY   CLINICAL PRESENTATION:   Presentation: Evolving clinical presentation with unstable and unpredictable characteristics: HIGH COMPLEXITY   CLINICAL DECISION MAKING:   Outcome Measure: Tool Used: Pelvic Floor Disability Index (PFDI-20)  Score:  Initial: 56 Most Recent: X (Date: -- )   Interpretation of Score: This survey asks questions concerning certain  bowel, bladder, or pelvic symptoms and how much this symptoms interfere with daily activiites. Each section is scored on a 0-4 scale, 4 representing the greatest disability. The scores of each section are added together for a total score out of 70. Score 0 1-13 14-27 28-41 42-55 56-69 70   Modifier CH CI CJ CK CL CM CN     Medical Necessity:   · Patient is expected to demonstrate progress in coordination to decrease assistance required with pelvic floor drops. Reason for Services/Other Comments:  · Patient continues to require skilled intervention due to pelvic pain.    Use of outcome tool(s) and clinical judgement create a POC that gives a: Difficult prediction of patient's progress: HIGH COMPLEXITY   TREATMENT:

## 2019-05-17 ENCOUNTER — HOSPITAL ENCOUNTER (OUTPATIENT)
Dept: PHYSICAL THERAPY | Age: 34
Discharge: HOME | End: 2019-05-17
Payer: COMMERCIAL

## 2019-05-17 PROCEDURE — 97110 THERAPEUTIC EXERCISES: CPT

## 2019-05-17 PROCEDURE — 97140 MANUAL THERAPY 1/> REGIONS: CPT

## 2019-05-17 NOTE — PROGRESS NOTES
Adrianne Meigs Sterling  : 1985  Primary: [facility]  Secondary:  [address] at [place]  [address] [place] [address], [facility] [address], [address]  [phone]   [phone]       [facility]  : 1985  Primary: [facility]  Secondary:  [address] at [place]  [address] [place] [address], [address] [place] [address] [address], [address]  [phone]   [phone]         OUTPATIENT PHYSICAL THERAPY: Daily Treatment Note 2019  MEDICAL/REFERRING DIAGNOSIS:  Pelvic and perineal pain [R10.2]   REFERRING PHYSICIAN: Jessenia Victoria MD  Pre-treatment Symptoms/Complaints:  Pt did try intercourse and it was painful. Also tried friction with dilator 2 of the valenzuela set and states it was also uncomfortable. Pain: Initial: Pain Intensity 1: 4  Post Session:  3/10   Medications Last Reviewed:  2019  Updated Objective Findings:  None Today   TREATMENT:   THERAPEUTIC EXERCISE: (12 minutes):  Exercises per grid below to improve strength and coordination. Required moderate verbal and tactile cues to promote proper body breathing techniques. Progressed repetitions and complexity of movement as indicated. MANUAL THERAPY: (45  minutes): Soft tissue mobilization was utilized and necessary because of the patient's painful spasm and restricted motion of soft tissue. (Used abbreviations: MET - muscle energy technique; SCS- Strain counter strain; CTM- Connective tissue mobilizations; CR- Contract/relax; SP- Sustained pressure, TrP- Trigger point release, IASTM- Instrument assisted soft tissue mobilizations, TDN- Trigger point dry needling). CTM to bilateral adductors with rolling and strumming. Nerve flossing to pudendal n. SCS and Trigger point to bilateral deep transverse perineum and superficial transverse, levator ani and oI  perineum to reduce tone and improve tissue elasticity.   CTM to sacrotuberous ligament, piriformis, and multifidus in prone Palpation into pelvic floor 7 min total time (not performed)   obturator internus via external palpation TrP  Lumbar trunk  rotation overpressure B (not performed)     Exercises:  Patient instructed in pelvic floor exercises listed below:    Date:  5/17/2019   Activity/Exercise     Prone extension    Multifidus     Left external rotation stretch     Adductor stetch    Cat cow    rena pose    Hip flexor stretch X 3 B standing   rena pose hands to the right and left    PF drop in supine with biofeedback assist for visual feedback via external anal sensors   10 min total time. [employer] Portal  The following educational topics were reviewed with patient:  None today. Treatment/Session Summary:   · Response to Treatment: Pt had excellent resting tone in pevic floor today. Was able to do pelvic floor drops with less than 1 uV. · Communication/Consultation:  None today  · Equipment provided today:  None today  · Recommendations/Intent for next treatment session: Next visit will focus on increase dilator if possible.     Treatment Plan of Care Effective Dates:  5/10/2019 to 8/8/2019   Total Treatment Billable Duration:  58 minutes   PT Patient Time In/Time Out  Time In: 1000  Time Out: 1100  Joon Kinney PT    Future Appointments   Date Time Provider Jose Cruz Maier   5/23/2019  8:00 AM Efren Chandra PT [facility]   5/28/2019 11:00 AM Efren Chandra PT [facility]   6/3/2019 11:00 AM Efren Chandra PT [facility]   6/12/2019  1:00 PM Efren Chandra PT [facility]   6/24/2019  1:00 PM MALIA Singh

## 2019-05-23 ENCOUNTER — HOSPITAL ENCOUNTER (OUTPATIENT)
Dept: PHYSICAL THERAPY | Age: 34
Discharge: HOME | End: 2019-05-23
Payer: COMMERCIAL

## 2019-05-23 PROCEDURE — 97140 MANUAL THERAPY 1/> REGIONS: CPT

## 2019-05-23 PROCEDURE — 97110 THERAPEUTIC EXERCISES: CPT

## 2019-05-23 NOTE — PROGRESS NOTES
Gracia Freeze Bob  : 1985  Primary: [facility] Shob[facility]  Secondary:  [address] [address] [facility], [address] [place] [address] [address], [facility] [address]  [phone]   [phone]       Yinka Lipoma  : 1985  Primary: Lori Yeager [facility]  Secondary:  [address]  at [facility]  [address] [facility], [address] [place] [address] [address], [address]  [phone]   [phone]         OUTPATIENT PHYSICAL THERAPY: Daily Treatment Note 2019  MEDICAL/REFERRING DIAGNOSIS:  Pelvic and perineal pain [R10.2]   REFERRING PHYSICIAN: Drake Momin MD  Pre-treatment Symptoms/Complaints:  Pt received Botox from Dr. Chana Jovel on Monday. She also reports having a nerve block and TrP injections. Her note from Dr. Chana Jovel is unavailable. She reports pain and soreness at the opening of the vulva and labia. Pain: Initial:   5/10  Labial pain Post Session:  5/10   Medications Last Reviewed:  2019  Updated Objective Findings:    Observation:  Edema noted at labia major left greater than right.   q-tip 1:00 (6/10), 2:00 (7/10), 3;00 (9/10), 4:00 (8:00), 5:00 (9/10) 6:00 (9/10) , 7:00 (8/10), 8:00 (9/10), 9:00 ( 9/10) 10:00 (8:00), 11:00 (5/10)   Right Left   Bulbocavernosus tenderness tenderness   Ischocavernosus     Superficial Transverse Perineal tenderness tenderness   Sphincter Uhrovaginal     Compressor Urethra      Obturator Internus Moderate tenderness Min tenderness   Iliococcygeus     Coccygeus     Pubococcygeus     Levator Ani Tenderness min Tenderness moderate  Increased tone    Adductor                         TREATMENT:   THERAPEUTIC EXERCISE: (10 minutes):  Exercises per grid below to improve strength and coordination. Required moderate verbal and tactile cues to promote proper body breathing techniques. Progressed repetitions and complexity of movement as indicated. MANUAL THERAPY: (30   minutes):  Soft tissue mobilization was utilized and necessary because of the patient's painful spasm and restricted motion of soft tissue. (Used abbreviations: MET - muscle energy technique; SCS- Strain counter strain; CTM- Connective tissue mobilizations; CR- Contract/relax; SP- Sustained pressure, TrP- Trigger point release, IASTM- Instrument assisted soft tissue mobilizations, TDN- Trigger point dry needling). CTM to bilateral adductors with rolling and strumming. Nerve flossing to pudendal n. (not performed)  SCS and Trigger point to bilateral deep transverse perineum and superficial transverse, levator ani and oI  perineum to reduce tone and improve tissue elasticity. CTM to sacrotuberous ligament, piriformis, and multifidus in prone  obturator internus via external palpation TrP  Lumbar trunk  rotation overpressure B (not performed)     Exercises:  Patient instructed in pelvic floor exercises listed below:    Date:  5/23/2019   Activity/Exercise     Prone extension    Multifidus     Left external rotation stretch     Adductor stetch    Cat cow    rena pose    Hip flexor stretch    rena pose hands to the right and left    PF drop in supine with biofeedback assist for visual feedback via external anal sensors   10 min total time. [employer] Portal  The following educational topics were reviewed with patient:  None today. Treatment/Session Summary:   · Response to Treatment: Pt had excellent good resting tone today with biofeedback. Did have improved tissue tone throughout the pelvic floor muscle after her injections on Monday. Will continue to work on tone of the pelvic floor muscle next visit. Pt 15 min late. · Communication/Consultation:  None today  · Equipment provided today:  None today  · Recommendations/Intent for next treatment session: Next visit will focus on increase dilator if possible.     Treatment Plan of Care Effective Dates:  5/10/2019 to 8/8/2019   Total Treatment Billable Duration:  58 minutes   PT Patient Time In/Time Out  Time In: 0815  Time Out: 0900  Alen Umanzor, MALIA    Future Appointments   Date Time Provider Jose Cruz Maier   5/28/2019 11:00 AM Silvio Nguyen, PT [facility]   6/3/2019 11:00 AM Silvio Nguyen, PT [facility]   6/12/2019  1:00 PM Silvio Nguyen, PT [facility]   6/24/2019  1:00 PM Silvio Nguyen, PT [facility]   6/26/2019  1:00 PM Silvio Nguyen, PT [facility]

## 2019-05-23 NOTE — PROGRESS NOTES
Irene Rojas  : 1985  Primary: Cat Christine [facility]  Secondary:  [address]  at [facility]  [address] , [address], [address] [place] [address]  [phone]   [phone]       Dairl Ted  : 1985  Primary: Cat Christine [facility]  Secondary:  [address]  at [facility]  [address] [facility] [address], [address], [address] [facility]  [phone]   [phone]         OUTPATIENT PHYSICAL THERAPY: Daily Treatment Note 2019  MEDICAL/REFERRING DIAGNOSIS:  Pelvic and perineal pain [R10.2]   REFERRING PHYSICIAN: Brit Sunshine MD  Pre-treatment Symptoms/Complaints:  Pt received Botox from Dr. Seema Patterson on Monday. She also reports having a nerve block and TrP injections. Her note from Dr. Seema Patterson is unavailable. She reports pain and soreness at the opening of the vulva and labia. Pain: Initial:   5/10  Labial pain Post Session:  5/10   Medications Last Reviewed:  2019  Updated Objective Findings:    Observation:  Edema noted at labia major left greater than right.   q-tip 1:00 (6/10), 2:00 (7/10), 3;00 (9/10), 4:00 (8:00), 5:00 (9/10) 6:00 (9/10) , 7:00 (8/10), 8:00 (9/10), 9:00 ( 9/10) 10:00 (8:00), 11:00 (5/10)   Right Left   Bulbocavernosus tenderness tenderness   Ischocavernosus     Superficial Transverse Perineal tenderness tenderness   Sphincter Uhrovaginal     Compressor Urethra      Obturator Internus Moderate tenderness Min tenderness   Iliococcygeus     Coccygeus     Pubococcygeus     Levator Ani Tenderness min Tenderness moderate  Increased tone    Adductor                         TREATMENT:   THERAPEUTIC EXERCISE: (10 minutes):  Exercises per grid below to improve strength and coordination. Required moderate verbal and tactile cues to promote proper body breathing techniques. Progressed repetitions and complexity of movement as indicated. MANUAL THERAPY: (45  minutes):  Soft tissue mobilization was utilized and necessary because of the patient's painful spasm and restricted motion of soft tissue. (Used abbreviations: MET - muscle energy technique; SCS- Strain counter strain; CTM- Connective tissue mobilizations; CR- Contract/relax; SP- Sustained pressure, TrP- Trigger point release, IASTM- Instrument assisted soft tissue mobilizations, TDN- Trigger point dry needling). CTM to bilateral adductors with rolling and strumming. Nerve flossing to pudendal n. (not performed)  SCS and Trigger point to bilateral deep transverse perineum and superficial transverse, levator ani and oI  perineum to reduce tone and improve tissue elasticity. CTM to sacrotuberous ligament, piriformis, and multifidus in prone  obturator internus via external palpation TrP  Lumbar trunk  rotation overpressure B (not performed)     Exercises:  Patient instructed in pelvic floor exercises listed below:    Date:  5/23/2019   Activity/Exercise     Prone extension    Multifidus     Left external rotation stretch     Adductor stetch    Cat cow    rena pose    Hip flexor stretch    rena pose hands to the right and left    PF drop in supine with biofeedback assist for visual feedback via external anal sensors   10 min total time. [employer]  The following educational topics were reviewed with patient:  None today. Treatment/Session Summary:   · Response to Treatment: Pt had excellent good resting tone today with biofeedback. Did have improved tissue tone throughout the pelvic floor muscle after her injections on Monday. Will continue to work on tone of the pelvic floor muscle next visit. · Communication/Consultation:  None today  · Equipment provided today:  None today  · Recommendations/Intent for next treatment session: Next visit will focus on increase dilator if possible.     Treatment Plan of Care Effective Dates:  5/10/2019 to 8/8/2019   Total Treatment Billable Duration:  55 minutes   PT Patient Time In/Time Out  Time In: 3409  Time Out: 0900  Pablito Giron PT    Future Appointments   Date Time Provider Jose Cruz Livier   5/28/2019 11:00 AM Lucina Diaz PT [facility] SELIN   6/3/2019 11:00 AM MALIA Antonio   6/12/2019  1:00 PM MALIA Antonio   6/24/2019  1:00 PM MALIA Antonio   6/26/2019  1:00 PM MALIA Antonio

## 2019-05-28 ENCOUNTER — HOSPITAL ENCOUNTER (OUTPATIENT)
Dept: PHYSICAL THERAPY | Age: 34
Discharge: HOME | End: 2019-05-28
Payer: COMMERCIAL

## 2019-05-28 PROCEDURE — 97140 MANUAL THERAPY 1/> REGIONS: CPT

## 2019-05-28 NOTE — PROGRESS NOTES
Heidi Rojas  : 1985  Primary: Mildred Emery [facility]  Secondary:  [address] at [address]  [address] [facility], [address], [address]  [phone]   [phone]       Eri Andersen  : 1985  Primary: Mildred Emery [facility]  Secondary:  [address] at [address]  [address] [facility], [address], [address]  [phone]   [phone]         OUTPATIENT PHYSICAL THERAPY: Daily Treatment Note 2019  MEDICAL/REFERRING DIAGNOSIS:  Pelvic and perineal pain [R10.2]   REFERRING PHYSICIAN: Danton Callas, MD  Pre-treatment Symptoms/Complaints:  Pt states that now that her pelvic pain is a little better she notices her tailbone much more. Pain: Initial:   4.5/10  Labial pain Post Session:  5.5/10   Medications Last Reviewed:  2019  Updated Objective Findings:    Observation:  Edema noted at labia major left greater than right.   q-tip 1:00 (6/10), 2:00 (7/10), 3;00 (9/10), 4:00 (8:00), 5:00 (9/10) 6:00 (9/10) , 7:00 (8/10), 8:00 (9/10), 9:00 ( 9/10) 10:00 (8:00), 11:00 (5/10)     Right Left   Bulbocavernosus tenderness tenderness   Ischocavernosus     Superficial Transverse Perineal tenderness tenderness   Sphincter Uhrovaginal     Compressor Urethra      Obturator Internus Moderate tenderness Min tenderness   Iliococcygeus     Coccygeus     Pubococcygeus     Levator Ani Tenderness min Tenderness moderate  Increased tone    Adductor                         TREATMENT:   THERAPEUTIC EXERCISE: (0 minutes):  Exercises per grid below to improve strength and coordination. Required moderate verbal and tactile cues to promote proper body breathing techniques. Progressed repetitions and complexity of movement as indicated. MANUAL THERAPY: (53  minutes): Soft tissue mobilization was utilized and necessary because of the patient's painful spasm and restricted motion of soft tissue.        (Used abbreviations: MET - muscle energy technique; SCS- Strain counter strain; CTM- Connective tissue mobilizations; CR- Contract/relax; SP- Sustained pressure, TrP- Trigger point release, IASTM- Instrument assisted soft tissue mobilizations, TDN- Trigger point dry needling). CTM to bilateral adductors with rolling and strumming. Nerve flossing to pudendal n. SCS and Trigger point to bilateral deep transverse perineum and superficial transverse, levator ani and oI  perineum to reduce tone and improve tissue elasticity. CTM to sacrotuberous ligament, piriformis, and multifidus in prone  obturator internus via external palpation TrP  Lumbar trunk  rotation overpressure B (not performed)     Exercises:  Patient instructed in pelvic floor exercises listed below:    Date:  5/28/2019   Activity/Exercise     Prone extension    Multifidus     Left external rotation stretch     Adductor stetch    Cat cow    rena pose    Hip flexor stretch    rena pose hands to the right and left    PF drop in supine with biofeedback assist for visual feedback via external anal sensors      Lumbar hip distraction             [employer] Portal  The following educational topics were reviewed with patient:  None today. Treatment/Session Summary:   · Response to Treatment: Pt states that she does have some increased tenderness at vulva. Less tone with the pelvic floor muscles. · Communication/Consultation:  None today  · Equipment provided today:  None today  · Recommendations/Intent for next treatment session: Next visit will focus on increase dilator if possible.     Treatment Plan of Care Effective Dates:  5/10/2019 to 8/8/2019   Total Treatment Billable Duration:  53 minutes   PT Patient Time In/Time Out  Time In: 1100  Time Out: [address] [employer] [facility], PT    Future Appointments   Date Time Provider Jose Cruz Maier   6/3/2019 11:00 AM MALIA Abbasi   6/12/2019  1:00 PM MALIA Abbasi   6/24/2019  1:00 PM Judy Read MALIA [place]   6/26/2019  1:00 PM MALIA Mccormick

## 2019-06-03 ENCOUNTER — HOSPITAL ENCOUNTER (OUTPATIENT)
Dept: PHYSICAL THERAPY | Age: 34
Discharge: HOME | End: 2019-06-03
Payer: COMMERCIAL

## 2019-06-03 PROCEDURE — 97140 MANUAL THERAPY 1/> REGIONS: CPT

## 2019-06-03 NOTE — PROGRESS NOTES
Souleymane Kruegerbeckie Bob  : 1985  Primary: Sarahy Arrieta [facility]  Secondary:  [address]  at [facility]  [address], [address], [address]  [phone]   [phone]       Rehana Simmons  : 1985  Primary: Sarahy Arrieta [facility]  Secondary:  [address]  at [facility]  [address], [address], [address]  [phone]   [phone]         OUTPATIENT PHYSICAL THERAPY: Daily Treatment Note 6/3/2019  MEDICAL/REFERRING DIAGNOSIS:  Pelvic and perineal pain [R10.2]   REFERRING PHYSICIAN: Umair Hong MD  Pre-treatment Symptoms/Complaints:  Pt states that she was very sore for about 4 days. Feels better today. Did have intercourse   Pain: Initial:   5/10 pain at the vulva and bladder Post Session:  .5/10   Medications Last Reviewed:  6/3/2019  Updated Objective Findings:    Observation:  Edema noted at labia major left greater than right.   q-tip 1:00 (6/10), 2:00 (7/10), 3;00 (9/10), 4:00 (8:00), 5:00 (9/10) 6:00 (9/10) , 7:00 (8/10), 8:00 (9/10), 9:00 ( 9/10) 10:00 (8:00), 11:00 (5/10)     Right Left   Bulbocavernosus tenderness tenderness   Ischocavernosus     Superficial Transverse Perineal tenderness tenderness   Sphincter Uhrovaginal     Compressor Urethra      Obturator Internus Moderate tenderness Min tenderness   Iliococcygeus     Coccygeus     Pubococcygeus     Levator Ani Tenderness min Tenderness moderate  Increased tone    Adductor                         TREATMENT:   THERAPEUTIC EXERCISE: (0 minutes):  Exercises per grid below to improve strength and coordination. Required moderate verbal and tactile cues to promote proper body breathing techniques. Progressed repetitions and complexity of movement as indicated. MANUAL THERAPY: (49  minutes): Soft tissue mobilization was utilized and necessary because of the patient's painful spasm and restricted motion of soft tissue.     (Used abbreviations: MET - muscle energy technique; SCS- Strain counter strain; CTM- Connective tissue mobilizations; CR- Contract/relax; SP- Sustained pressure, TrP- Trigger point release, IASTM- Instrument assisted soft tissue mobilizations, TDN- Trigger point dry needling). CTM to bilateral adductors with rolling and strumming. Nerve flossing to pudendal n. SCS and Trigger point to bilateral deep transverse perineum and superficial transverse, levator ani and oI  perineum to reduce tone and improve tissue elasticity. CTM to sacrotuberous ligament, piriformis, and multifidus in prone  obturator internus via external palpation TrP  Lumbar trunk  rotation overpressure B (not performed)     Exercises:  Patient instructed in pelvic floor exercises listed below:    Date:  6/3/2019   Activity/Exercise     Prone extension    Multifidus     Left external rotation stretch     Adductor stetch    Cat cow    rena pose    Hip flexor stretch    rena pose hands to the right and left    PF drop in supine with biofeedback assist for visual feedback via external anal sensors      Lumbar hip distraction             [employer] Portal  The following educational topics were reviewed with patient:  None today. Treatment/Session Summary:   · Response to Treatment: Pt did well today. Still sore at the vulvar opening. Decreased manual therapy time today. · Communication/Consultation:  None today  · Equipment provided today:  None today  · Recommendations/Intent for next treatment session: Next visit will focus on increase dilator if possible.     Treatment Plan of Care Effective Dates:  5/10/2019 to 8/8/2019   Total Treatment Billable Duration:  53 minutes   PT Patient Time In/Time Out  Time In: 1100  Time Out: [address], PT    Future Appointments   Date Time Provider Jose Cruz Maier   6/12/2019  1:00 PM MALIA Desouza   6/26/2019  1:00 PM MALIA Desouza   7/3/2019  1:00 PM Juanjo Gonzales PT [facility] SELIN   7/10/2019  1:00 PM Myrna Romero, PT [facility]   7/17/2019  1:00 PM Myrna Romero, PT [facility]   7/24/2019  1:00 PM Myrna Romero, PT [facility]   7/31/2019  1:00 PM Myrna Romero, PT [facility] [place]

## 2019-06-24 ENCOUNTER — APPOINTMENT (OUTPATIENT)
Dept: PHYSICAL THERAPY | Age: 34
End: 2019-06-24
Payer: COMMERCIAL

## 2019-06-26 ENCOUNTER — HOSPITAL ENCOUNTER (OUTPATIENT)
Dept: PHYSICAL THERAPY | Age: 34
Discharge: HOME | End: 2019-06-26
Payer: COMMERCIAL

## 2019-06-26 PROCEDURE — 97110 THERAPEUTIC EXERCISES: CPT

## 2019-06-26 PROCEDURE — 97140 MANUAL THERAPY 1/> REGIONS: CPT

## 2019-06-26 NOTE — PROGRESS NOTES
Genaro Rojas  : 1985  Primary: Chcihi Mckenzie  Secondary:  [address]  at [address], [address] [place] [address] [address], [address]  [phone]   [phone]       Mo June  : 1985  Primary: Chichi Grimaldo [facility]  Secondary:  [address]  at [address], [address] [place] [address] [address], [address]  [phone]   [phone]       OUTPATIENT PHYSICAL THERAPY: Daily Treatment Note 2019  MEDICAL/REFERRING DIAGNOSIS:  Pelvic and perineal pain [R10.2]   REFERRING PHYSICIAN: Shiv Goins MD  Pre-treatment Symptoms/Complaints:  Pt states that she just moved and has been undergoing increased stress. Feels more pelvic and vaginal pain. Pain: Initial:   5/10 pain at the vulva and bladder Post Session:  .5/10 pain at the vulva  7/10 pain at the hips   Medications Last Reviewed:  2019  Updated Objective Findings:    Observation:  Edema noted at labia major left greater than right.   q-tip 1:00 (6/10), 2:00 (7/10), 3;00 (9/10), 4:00 (8:00), 5:00 (9/10) 6:00 (9/10) , 7:00 (8/10), 8:00 (9/10), 9:00 ( 9/10) 10:00 (8:00), 11:00 (5/10)     Right Left   Bulbocavernosus tenderness tenderness   Ischocavernosus     Superficial Transverse Perineal tenderness tenderness   Sphincter Uhrovaginal     Compressor Urethra      Obturator Internus Moderate tenderness Min tenderness   Iliococcygeus     Coccygeus     Pubococcygeus     Levator Ani Tenderness min Tenderness moderate  Increased tone    Adductor                         TREATMENT:   THERAPEUTIC EXERCISE: (8 minutes):  Exercises per grid below to improve strength and coordination. Required moderate verbal and tactile cues to promote proper body breathing techniques. Progressed repetitions and complexity of movement as indicated. MANUAL THERAPY: (45  minutes):  Soft tissue mobilization was utilized and necessary because of the patient's painful spasm and restricted motion of soft tissue. (Used abbreviations: MET - muscle energy technique; SCS- Strain counter strain; CTM- Connective tissue mobilizations; CR- Contract/relax; SP- Sustained pressure, TrP- Trigger point release, IASTM- Instrument assisted soft tissue mobilizations, TDN- Trigger point dry needling). CTM to bilateral adductors with rolling and strumming. Nerve flossing to pudendal n. SCS and Trigger point to bilateral deep transverse perineum and superficial transverse, levator ani and oI  perineum to reduce tone and improve tissue elasticity. CTM to sacrotuberous ligament, piriformis, and multifidus in prone  obturator internus via external palpation TrP  Lumbar trunk  rotation overpressure B (not performed)     Exercises:  Patient instructed in pelvic floor exercises listed below:    Date:  6/26/2019   Activity/Exercise     Prone extension    Multifidus     Left external rotation stretch     Adductor stetch 30 sec x 3   Cat cow    rena pose    Hip flexor stretch 3 x 30 sec    rena pose hands to the right and left    PF drop in supine with biofeedback assist for visual feedback via external anal sensors      Lumbar hip distraction     Hip flexion piriformis stretch 3 x 30 sec B       [employer] Portal  The following educational topics were reviewed with patient:  None today. Treatment/Session Summary:   · Response to Treatment: Pt did well today. Still sore at the vulvar opening. Decreased manual therapy time today. · Communication/Consultation:  None today  · Equipment provided today:  None today  · Recommendations/Intent for next treatment session: Next visit will focus on increase dilator if possible.     Treatment Plan of Care Effective Dates:  5/10/2019 to 8/8/2019   Total Treatment Billable Duration:  55 minutes   PT Patient Time In/Time Out  Time In: 1300  Time Out: [address] [facility], PT    Future Appointments   Date Time Provider Jose Cruz Maier   7/3/2019  1:00 PM Jorge Sanders S, PT [facility]   7/10/2019  1:00 PM Mary Whitaker, PT [facility]   7/17/2019  1:00 PM Mary Whitaker, PT [facility]   7/24/2019  1:00 PM Mary Whitaker, PT [facility]   7/31/2019  1:00 PM Mary Whitaker, PT [facility]

## 2019-07-03 ENCOUNTER — HOSPITAL ENCOUNTER (OUTPATIENT)
Dept: PHYSICAL THERAPY | Age: 34
Discharge: HOME | End: 2019-07-03
Payer: COMMERCIAL

## 2019-07-03 PROCEDURE — 97140 MANUAL THERAPY 1/> REGIONS: CPT

## 2019-07-03 PROCEDURE — 97014 ELECTRIC STIMULATION THERAPY: CPT

## 2019-07-03 NOTE — PROGRESS NOTES
Don Rojas  : 1985  Primary: Yessi O[employer] [employer]  Secondary:  [address]  at [facility]  [address] [facility], [address] [place] [address] [address], [address]  [phone]   [phone]       Zenobia Langford  : 1985  Primary: Yessi Oconnor [facility]  Secondary:  [address]  at [facility]  [address] [facility], [address] [facility] [address], [address]  [phone]   [phone]       OUTPATIENT PHYSICAL THERAPY: Daily Treatment Note 7/3/2019  MEDICAL/REFERRING DIAGNOSIS:  Pelvic and perineal pain [R10.2]   REFERRING PHYSICIAN: Reina Gerber MD  Pre-treatment Symptoms/Complaints:  Feels deep pain at the pelvic area. Cloapin, 2 trampadol, mm relaxer, and Tylenol l and is still having pain. Feels hip pain. The Botox may have decreased her medication slightly. Pain: Initial:    7/10 pelvis   Post Session:  6/10   Medications Last Reviewed:  7/3/2019     TREATMENT:   THERAPEUTIC EXERCISE: (0 minutes):  Exercises per grid below to improve strength and coordination. Required moderate verbal and tactile cues to promote proper body breathing techniques. Progressed repetitions and complexity of movement as indicated. MANUAL THERAPY: (45  minutes): Soft tissue mobilization was utilized and necessary because of the patient's painful spasm and restricted motion of soft tissue. (Used abbreviations: MET - muscle energy technique; SCS- Strain counter strain; CTM- Connective tissue mobilizations; CR- Contract/relax; SP- Sustained pressure, TrP- Trigger point release, IASTM- Instrument assisted soft tissue mobilizations, TDN- Trigger point dry needling). MODALITIES: (10 minutes): *  Electrical Stimulation Therapy (IFC NMES to vaginal canal via small internal electrode) was provided with intensity adjusted throughout treatment to patient tolerance. Hot pack provided. Skin intact after treatment    CTM to bilateral adductors with rolling and strumming.     Nerve flossing to pudendal n. SCS and Trigger point to bilateral deep transverse perineum and superficial transverse, levator ani and oI  perineum to reduce tone and improve tissue elasticity. CTM to sacrotuberous ligament, piriformis, and multifidus in prone  obturator internus via external palpation TrP(not performed)  Lumbar trunk  rotation overpressure B (not performed)     Exercises:  Patient instructed in pelvic floor exercises listed below:    Date:  7/3/2019   Activity/Exercise     Prone extension    Multifidus     Left external rotation stretch     Adductor stetch    Cat cow    rena pose    Hip flexor stretch    rena pose hands to the right and left    PF drop in supine with biofeedback assist for visual feedback via external anal sensors      Lumbar hip distraction     Hip flexion piriformis stretch        [employer] Portal  The following educational topics were reviewed with patient:  None today. Treatment/Session Summary:   · Response to Treatment: Pt continue to report pain in the pelvic floor region with some pain at the adductors after treatment. I plan to call MD in regards to her pain level at this time. · Communication/Consultation:  None today  · Equipment provided today:  None today  · Recommendations/Intent for next treatment session: Next visit will focus on increase dilator if possible.     Treatment Plan of Care Effective Dates:  5/10/2019 to 8/8/2019   Total Treatment Billable Duration:  55 minutes   PT Patient Time In/Time Out  Time In: 1300  Time Out: [address] [facility], PT    Future Appointments   Date Time Provider Jose Cruz Maier   7/10/2019  1:00 PM Frank Griggs PT [facility] SEL[facility]   7/17/2019  1:00 PM Frank Griggs PT [facility]   7/24/2019  1:00 PM Frank Griggs PT [facility]   7/31/2019  1:00 PM Frank Griggs PT [facility]

## 2019-07-10 ENCOUNTER — HOSPITAL ENCOUNTER (OUTPATIENT)
Dept: PHYSICAL THERAPY | Age: 34
Discharge: HOME | End: 2019-07-10
Payer: COMMERCIAL

## 2019-07-10 PROCEDURE — 97014 ELECTRIC STIMULATION THERAPY: CPT

## 2019-07-10 PROCEDURE — 97140 MANUAL THERAPY 1/> REGIONS: CPT

## 2019-07-10 NOTE — PROGRESS NOTES
Nani Rojas  : 1985  [facility]  Secondary:  [address] at [facility]  [address] [facility], [address] [place] [address] [address], [address]  [phone]   [phone]       Yannick Route  : 1985  [facility]  Secondary:  [address]  at [facility]  [address] [facility], [address] [facility] [address], [address]  [phone]   [phone]       OUTPATIENT PHYSICAL THERAPY: Daily Treatment Note 7/10/2019  MEDICAL/REFERRING DIAGNOSIS:  Pelvic and perineal pain [R10.2]   REFERRING PHYSICIAN: Ephraim Davis MD  Pre-treatment Symptoms/Complaints:   Pt reported that she had intercourse last night. Feels like her pelvic, back, and legs are hurting. Pain: Initial:    /10 pelvis   Post Session:  6/10   Medications Last Reviewed:  7/10/2019     TREATMENT:   THERAPEUTIC EXERCISE: (0 minutes):  Exercises per grid below to improve strength and coordination. Required moderate verbal and tactile cues to promote proper body breathing techniques. Progressed repetitions and complexity of movement as indicated. MANUAL THERAPY: (30  minutes): Soft tissue mobilization was utilized and necessary because of the patient's painful spasm and restricted motion of soft tissue. (Used abbreviations: MET - muscle energy technique; SCS- Strain counter strain; CTM- Connective tissue mobilizations; CR- Contract/relax; SP- Sustained pressure, TrP- Trigger point release, IASTM- Instrument assisted soft tissue mobilizations, TDN- Trigger point dry needling). MODALITIES: (10 minutes): *  Electrical Stimulation Therapy (IFC NMES to vaginal canal via small internal electrode) was provided with intensity adjusted throughout treatment to patient tolerance. Skin intact after treatment    CTM to bilateral adductors with rolling and strumming. Nerve flossing to pudendal n.   SCS and Trigger point to bilateral deep transverse perineum and superficial transverse, levator ani and oI  perineum to reduce tone and improve tissue elasticity. CTM to sacrotuberous ligament, piriformis, and multifidus in prone  obturator internus via external palpation TrP(not performed)  Lumbar trunk  rotation overpressure B (not performed)     Exercises:  Patient instructed in pelvic floor exercises listed below:    Date:  7/10/2019   Activity/Exercise     Prone extension    Multifidus     Left external rotation stretch     Adductor stetch    Cat cow    rena pose    Hip flexor stretch    rena pose hands to the right and left    PF drop in supine with biofeedback assist for visual feedback via external anal sensors      Lumbar hip distraction     Hip flexion piriformis stretch        [employer] Portal  The following educational topics were reviewed with patient:  None today. Treatment/Session Summary:   · Response to Treatment: Pt reported no change with palpation in the pelvic floor. Will try dilator next visit. Did well with NMES. · Communication/Consultation:  None today  · Equipment provided today:  None today  · Recommendations/Intent for next treatment session: Next visit will focus on increase dilator if possible.     Treatment Plan of Care Effective Dates:  5/10/2019 to 8/8/2019   Total Treatment Billable Duration:  40 minutes   PT Patient Time In/Time Out  Time In: 1766  Time Out: [facility],     Future Appointments   Date Time Provider Jose Cruz Maier   7/17/2019  1:00 PM Madina Ponce PT [facility] SELIN   7/24/2019  1:00 PM MALIA Shabazz   7/31/2019  1:00 PM MALIA Shabazz

## 2019-07-17 ENCOUNTER — HOSPITAL ENCOUNTER (OUTPATIENT)
Dept: PHYSICAL THERAPY | Age: 34
Discharge: HOME | End: 2019-07-17
Payer: COMMERCIAL

## 2019-07-17 PROCEDURE — 97140 MANUAL THERAPY 1/> REGIONS: CPT

## 2019-07-17 NOTE — PROGRESS NOTES
Xiao Rojas  : 1985  Primary: [facility][[place] 2[facility]  Secondary:  [address]  at [place] [id number] [facility], [address] [place] [address] [address], [address] [place] [address]  [phone]   [phone]       Lena Anderson  : 1985  Primary: Sharonarnel [facility][note 2]  Secondary:  [address]  at [facility]  [address] [facility], [address] [place] [address] [address], [address] [facility]  [phone]   [phone]       OUTPATIENT PHYSICAL THERAPY: Daily Treatment Note 2019  MEDICAL/REFERRING DIAGNOSIS:  Pelvic and perineal pain [R10.2]   REFERRING PHYSICIAN: Hilda Negrete MD  Pre-treatment Symptoms/Complaints:   Pt reported that she had intercourse on Monday. Had an orgasm and it was painful. Pain: Initial:    /10 pelvis   Post Session:  7/10   Medications Last Reviewed:  2019     TREATMENT:   THERAPEUTIC EXERCISE: (0 minutes):  Exercises per grid below to improve strength and coordination. Required moderate verbal and tactile cues to promote proper body breathing techniques. Progressed repetitions and complexity of movement as indicated. MANUAL THERAPY: (53  minutes): Soft tissue mobilization was utilized and necessary because of the patient's painful spasm and restricted motion of soft tissue. (Used abbreviations: MET - muscle energy technique; SCS- Strain counter strain; CTM- Connective tissue mobilizations; CR- Contract/relax; SP- Sustained pressure, TrP- Trigger point release, IASTM- Instrument assisted soft tissue mobilizations, TDN- Trigger point dry needling). MODALITIES: (0 minutes): *  Electrical Stimulation Therapy (IFC NMES to vaginal canal via small internal electrode) was provided with intensity adjusted throughout treatment to patient tolerance. Skin intact after treatment    CTM to bilateral adductors with rolling and strumming. Nerve flossing to pudendal n.   SCS and Trigger point to bilateral deep transverse perineum and superficial transverse, levator ani and oI  perineum to reduce tone and improve tissue elasticity. CTM to sacrotuberous ligament, piriformis, and multifidus in prone  obturator internus via external palpation TrP  Lumbar trunk  rotation overpressure B     Exercises:  Patient instructed in pelvic floor exercises listed below:    Date:  7/17/2019   Activity/Exercise     Prone extension    Multifidus     Left external rotation stretch     Adductor stetch    Cat cow    rena pose    Hip flexor stretch    rena pose hands to the right and left    PF drop in supine with biofeedback assist for visual feedback via external anal sensors      Lumbar hip distraction     Hip flexion piriformis stretch        [employer]Bridge Portal  The following educational topics were reviewed with patient:  None today. Treatment/Session Summary:   · Response to Treatment: Pt reported pain increased to a 7/10 after manual therapy. Pt slow progress. · Communication/Consultation:  None today  · Equipment provided today:  None today  · Recommendations/Intent for next treatment session: Next visit will focus on increase dilator if possible.     Treatment Plan of Care Effective Dates:  5/10/2019 to 8/8/2019   Total Treatment Billable Duration:  53 minutes   PT Patient Time In/Time Out  Time In: 1305  Time Out: [address], PT    Future Appointments   Date Time Provider Jose Cruz Maier   7/24/2019  1:00 PM Samantha Hu PT [facility] SFE   7/31/2019  1:00 PM Samantha Hu PT [facility] SFE   8/7/2019  2:00 PM Saamntha Hu PT [facility]FE   8/14/2019  8:00 AM Samantha Hu PT [facility]FE   8/21/2019  1:00 PM Samantha uH PT [facility] SFE   8/28/2019  1:00 PM Samantha Hu PT [facility] S[facility]

## 2019-07-24 ENCOUNTER — HOSPITAL ENCOUNTER (OUTPATIENT)
Dept: PHYSICAL THERAPY | Age: 34
Discharge: HOME | End: 2019-07-24
Payer: COMMERCIAL

## 2019-07-24 PROCEDURE — 97140 MANUAL THERAPY 1/> REGIONS: CPT

## 2019-07-24 NOTE — PROGRESS NOTES
Radhika Rojas  : 1985  Primary: [facility]  Secondary:  [address]  at [facility]  27[address] [facility], [address] [facility] [address], [address]  [phone]   [phone]       Ival Copper  : 1985  Primary: [facility]  Secondary:  [address]  at [facility]  27[address] [facility], 301 [facility] [address], [address]  [phone]   [phone]       OUTPATIENT PHYSICAL THERAPY: Daily Treatment Note 2019  MEDICAL/REFERRING DIAGNOSIS:  Pelvic and perineal pain [R10.2]   REFERRING PHYSICIAN: Mary Solomon MD  Pre-treatment Symptoms/Complaints:   Pt reports pain in vulvar region and some burning discomfort. Is planning on contacting Dr. Maribel Potts about her recent X-Ray on her coccyx and her artifact in the left lower abdominal quadrant. Pain: Initial:   5.5 /10 pelvis   Post Session:  5.5/10   Medications Last Reviewed:  2019     TREATMENT:   THERAPEUTIC EXERCISE: (0 minutes):  Exercises per grid below to improve strength and coordination. Required moderate verbal and tactile cues to promote proper body breathing techniques. Progressed repetitions and complexity of movement as indicated. MANUAL THERAPY: (53  minutes): Soft tissue mobilization was utilized and necessary because of the patient's painful spasm and restricted motion of soft tissue. (Used abbreviations: MET - muscle energy technique; SCS- Strain counter strain; CTM- Connective tissue mobilizations; CR- Contract/relax; SP- Sustained pressure, TrP- Trigger point release, IASTM- Instrument assisted soft tissue mobilizations, TDN- Trigger point dry needling). MODALITIES: (0 minutes): *  Electrical Stimulation Therapy (IFC NMES to vaginal canal via small internal electrode) was provided with intensity adjusted throughout treatment to patient tolerance. Skin intact after treatment    CTM to bilateral adductors with rolling and strumming.     Nerve flossing to pudendal n. SCS and Trigger point to bilateral deep transverse perineum and superficial transverse, levator ani and oI  perineum to reduce tone and improve tissue elasticity. CTM to sacrotuberous ligament, piriformis, and multifidus in prone  obturator internus via external palpation TrP  Lumbar trunk  rotation overpressure B     Exercises:  Patient instructed in pelvic floor exercises listed below:    Date:  7/24/2019   Activity/Exercise     Prone extension    Multifidus     Left external rotation stretch     Adductor stetch    Cat cow    rena pose    Hip flexor stretch    rena pose hands to the right and left    PF drop in supine with biofeedback assist for visual feedback via external anal sensors      Lumbar hip distraction     Hip flexion piriformis stretch        [employer] Portal  The following educational topics were reviewed with patient:  None today. Treatment/Session Summary:   · Response to Treatment: Pt reports no change in symptoms. Pt again forgot her dilator. Will educate further on therawand next visit. · Communication/Consultation:  None today  · Equipment provided today:  None today  · Recommendations/Intent for next treatment session: Next visit will focus on increase dilator if possible.     Treatment Plan of Care Effective Dates:  5/10/2019 to 8/8/2019   Total Treatment Billable Duration:  53 minutes   PT Patient Time In/Time Out  Time In: 1300  Time Out: [facility], PT    Future Appointments   Date Time Provider Jose Cruz Maier   7/31/2019  1:00 PM Rachel Keen PT [facility] SFE   8/7/2019  2:00 PM Rachel Keen PT [facility] SFE   8/14/2019  8:00 AM Rachel Keen PT [facility]FE   8/21/2019  1:00 PM Rachel Keen PT [facility]   8/28/2019  1:00 PM Rachel Keen, PT [facility] S[facility]

## 2019-07-31 ENCOUNTER — APPOINTMENT (OUTPATIENT)
Dept: PHYSICAL THERAPY | Age: 34
End: 2019-07-31
Payer: COMMERCIAL

## 2019-08-07 ENCOUNTER — HOSPITAL ENCOUNTER (OUTPATIENT)
Dept: PHYSICAL THERAPY | Age: 34
Discharge: HOME | End: 2019-08-07
Payer: COMMERCIAL

## 2019-08-07 PROCEDURE — 97140 MANUAL THERAPY 1/> REGIONS: CPT

## 2019-08-07 NOTE — PROGRESS NOTES
Viv Rojas  : 1985  Primary: [facility]  Secondary:  [address]  at [facility]  27[address] [facility], [facility] [address], [address]  [phone]   [phone]       Amina Cody  : 1985  Primary: [facility]  Secondary:  [address]  at [facility]  [address] [facility], [address] [place] [address] [address]  [phone]   [phone]       OUTPATIENT PHYSICAL THERAPY: Daily Treatment Note 2019  MEDICAL/REFERRING DIAGNOSIS:  Pelvic and perineal pain [R10.2]   REFERRING PHYSICIAN: Sarika Wells MD  Pre-treatment Symptoms/Complaints:   Pt got the results of the her X-Ray from Dr. Aliza Atkins office. She states that she does not have an artifact in the left lower abdominal quadrant. She does states that there is an issue with the coccyx healing. 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 Has been on vacation and has not done her dilators. Pain: Initial:   5.5 /10 pelvis   Post Session:  5.5/10   Medications Last Reviewed:  2019     TREATMENT:   THERAPEUTIC EXERCISE: (0 minutes):  Exercises per grid below to improve strength and coordination. Required moderate verbal and tactile cues to promote proper body breathing techniques. Progressed repetitions and complexity of movement as indicated. MANUAL THERAPY: (53  minutes): Soft tissue mobilization was utilized and necessary because of the patient's painful spasm and restricted motion of soft tissue. (Used abbreviations: MET - muscle energy technique; SCS- Strain counter strain; CTM- Connective tissue mobilizations; CR- Contract/relax; SP- Sustained pressure, TrP- Trigger point release, IASTM- Instrument assisted soft tissue mobilizations, TDN- Trigger point dry needling). MODALITIES: (0 minutes):       *  Electrical Stimulation Therapy (IFC NMES to vaginal canal via small internal electrode) was provided with intensity adjusted throughout treatment to patient tolerance. Skin intact after treatment    CTM to bilateral adductors with rolling and strumming. Nerve flossing to pudendal n. SCS and Trigger point to bilateral deep transverse perineum and superficial transverse, levator ani and oI  perineum to reduce tone and improve tissue elasticity. CTM to sacrotuberous ligament, piriformis, and multifidus in prone  Right psoas release.      Exercises:  Patient instructed in pelvic floor exercises listed below:    Date:  8/7/2019   Activity/Exercise     Prone extension    Multifidus     Left external rotation stretch     Adductor stetch    Cat cow    rena pose    Hip flexor stretch    rena pose hands to the right and left    PF drop in supine with biofeedback assist for visual feedback via external anal sensors      Lumbar hip distraction     Hip flexion piriformis stretch        [employer] Portal  The following educational topics were reviewed with patient:  None today. Treatment/Session Summary:   · Response to Treatment: Minimal change noted today at the pelvic floor and with adductors. Attempted to do left psoas release, but pt unable to tolerated. I encouraged pt to participate in stabilization exercises at home. Would like to begin stability in the clinic next visit. Re-eval next visit. · Equipment provided today:  None today  · Recommendations/Intent for next treatment session: Next visit will focus on increase dilator if possible.     Treatment Plan of Care Effective Dates:  5/10/2019 to 8/8/2019   Total Treatment Billable Duration:  53 minutes   PT Patient Time In/Time Out  Time In: [address] PT    Future Appointments   Date Time Provider Jose Cruz Maier   8/14/2019  8:00 AM MALIA Mercedes   8/21/2019  1:00 PM MALIA Mercedes   8/28/2019  1:00 PM MALIA Mercedes

## 2019-08-14 ENCOUNTER — HOSPITAL ENCOUNTER (OUTPATIENT)
Dept: PHYSICAL THERAPY | Age: 34
Discharge: HOME | End: 2019-08-14
Payer: COMMERCIAL

## 2019-08-14 PROCEDURE — 97140 MANUAL THERAPY 1/> REGIONS: CPT

## 2019-08-14 PROCEDURE — 97010 HOT OR COLD PACKS THERAPY: CPT

## 2019-08-14 NOTE — THERAPY RECERTIFICATION
Anish Rojas  : 1985  Primary: Kristen Mckenzie  Secondary:  [address] at [place] [id number] [facility], [address] [place] [address],[address], [address]  [phone]   [phone]          OUTPATIENT PHYSICAL THERAPY:Recertification [id number]    ICD-10: Treatment Diagnosis: Myalgia (M79.1)Fibromyalgia (M79.7)Other lack of coordination (R27.8)   Precautions/Allergies:   Codeine; Dermagraft [cult skin subst, human-bovine]; and Percocet [oxycodone-acetaminophen]   MD Orders: Eval and treat MEDICAL/REFERRING DIAGNOSIS:  Pelvic and perineal pain [R10.2]   DATE OF ONSET: Chronic  REFERRING PHYSICIAN: Juanita Davies MD  RETURN PHYSICIAN APPOINTMENT: TBD      RE-Evaluation:  2019  Ms. Robyn Tapia has been seen in physical therapy since 2019. Pascual Dunn She recently had botox injections with Dr. Luis Perdomo back in March. I feel with palpation the tissue feel pliable with minimal tone, but Ms. Rojas continue to have significant pan with even gentle palpation throughout the pelvic floor mm. Her coccyx pain remains unchanged. She did have improvements on her self report Pain Disability Index. She will return to Dr. Luis Perdomo for increased botox treatments soon. I am hopeful that this round of injections will produce a greater result. Pt continues to have poor rehab potential. She is slowly working towards her PT goals. Continue PT 1x / week to address pelvis and vulvar impairments. Re-Evaluation  5/10/2019  Ms. Robyn Tapia has been to physical therapy since 2019 for 20 visits. She continues to progress slowly but steadily. She is now able to tolerate palpation into the pelvic floor for up to 8 min with 1 digit. We are now able to palpate bilateral levator ani and obturator internus. She is able to participate in penetrative intercourse, but continues to have pain. She has been instructed to attempt dilator insertion to and add friction if able to do so.  She recently was advised to consider a x-ray of her coccyx which reveal a posterior inferior sacral fracture and some type of artifact in the left abdominal region. She will be sending her images to her Dr. Jean-Paul Bailey in [place]. She also saw a URO/GYN in [facility] and will be having Botox, n. Block, and Due to her deficits she continues to be a good candidate for PT. Continue PT 1 x week. Re-Evaluation  2/8/2019  Ms. Rojas has been to therapy for 10 visits one time per week. She has made minimal gains with PT. She presents with good resting tone with biofeedback. She is only able to tolerate insertion of 1 digit into the pelvic floor for 5 min. After her visit, she reports discomfort for 3-4 hours. She has been able to tolerate active gentle exercises such as the Nu-step. She continues to have increased pain with palpation throughout the superficial and deep layers of the pelvic floor left greater than right. She did have penetrative intercourse with her  a few weeks ago resulting in pain for 2 days. She has many barriers for rehab which include numerous surgeries for endometriosis and previous attempts with PT. I expect slow and steady progress. She is motivated with PT. Due to her deficits she continues to be a good candidate for PT. INITIAL ASSESSMENT:  Ms. Jinnie Cheadle returns to therapy after having surgery with Dr. Jean-Paul Bailey and botox with Dr. Eusebia Rivas. She is now 3 weeks post op her endometrial surgery. She had previous Botox prior to surgery in the pelvic floor mm. . Her surgical and therapy history is very long and complex. She has had multiple surgery's and multiple encoutners with pelvic floor rehabilitation. Today in the clinic, I was only able to palpate at the introitus and the first layer of pelvic floor mm. Pt reported significant pain with palpation at the vaginal opening, labia majora, and abdomen. Therefore, I suggested we wait two weeks until her next visit. She was agreeable with POC.   Pt is an excellent candidate for pelvic floor therapy due to her limitations with sitting, standing, penetrative intercourse, PMH, and significant level of pain. Pt would benefit from skilled PT. PROBLEM LIST (Impacting functional limitations):  1. Decreased Strength  2. Decreased ADL/Functional Activities  3. Increased Pain  4. Decreased Flexibility/Joint Mobility  5. Decreased Surprise with Home Exercise Program INTERVENTIONS PLANNED:  1. Neuromuscular re-education  2. Biofeedback as needed  3. HEP  4. Bladder retraining  5. Bladder education  6. Electrical Stimulation  7. Home Exercise Program (HEP)  8. Manual Therapy  9. Range of Motion (ROM)  10. Therapeutic Exercise/Strengthening  11. Ultrasound (US)  12. low level light therapy   TREATMENT PLAN:  Effective Dates:8/14/2019 to 11/12.2019 (90 days). Frequency/Duration: 2 times a week for 90 Days  GOALS: (Goals have been discussed and agreed upon with patient.)  Short-Term Functional Goals: Time Frame: in 4 weeks  1. Patient will demonstrate independence with home exercise program.(met)  Discharge Goals: Time Frame:in 12 weeks  1. Pt able to tolerate size 2 Amielle dilator for 8 min in order to tolerate vaginal penetration. (working towards)  2. Pt report pain with palpation on the left side of the pelivc floor less than 2/10. (not met)  3. Pt will tolerate 30 min of aerobic activity without increased pelvic pain. (working towards)  Rehabilitation Potential For Stated Goals: Poor  Regarding Dayana Rojas's therapy, I certify that the treatment plan above will be carried out by a therapist or under their direction. Thank you for this referral,  Claudia Hinojosa PT     Referring Physician Signature: Denia Pantoja MD              Date                    The information in this section was collected on 8/14/2019  (except where otherwise noted). HISTORY:   History of Present Injury/Illness (Reason for Referral):  Dx at 13 with endometriosis.  That was when she had her first laprascropy. Has had 4 laparoscopy. First vaginal delivery 2009. Second child was born 2010. Had a vaginal delivery. Did do an ablation  which lasted for about one year. 2014 had a hysterectomy. Got sick after surgery. 8 weeks later had a vaginal dehiscence. Was septic then. Did not rebuild the cuff. The endo starts to attach the vaginal cuff. Started seeing Rcoco Landrum for pelvic floor therapy. Hershal Backer is having difficulty with palpation. Nov 2016 had a excision surgery for endometriosis. They build up the cuff. They tacked the left ovary. Eventually took out the left ovary in 2017 and found endo in the pelvis. In 2017, was dx with fibro. Javier Colin tried to ablation of the ilioinguinal nerve. Had necrosis around the hip bone. Recently had Botox with . Dr Sally Lew had a cytoscopy and the bladder was healthy. Went to see Dr. Eliu Chew to  redo surgery in the pelvis for possible endo. Deadend nerve and it feels better. Vaginal valium in the past was helpful. Needling does help pain, soft tissue, and deep tissues. Unable to stand for any period of time. Does better with movement. Had tailbone pain for a while and does use cushions. [facility]els [facility] at the [facility]. 8/14/2019  Pt states that she feels like the botox is wearing off as she feels that the labia feel more heavy and painful. Did have intercourse with her  and she feels very sore in the vulvar area. Past Medical History/Comorbidities:   Ms. Twan Danielson  has a past medical history of Anxiety (9/6/2013), Bladder pain, Chronic pain, Endometriosis, Gluten intolerance, High-tone pelvic floor dysfunction, IBS (irritable bowel syndrome), Migraine, Nausea & vomiting, Ovarian cyst, PUD (peptic ulcer disease) (12/2013 ), Unspecified adverse effect of anesthesia, UTI (urinary tract infection), and Vaginal cuff dehiscence.   Ms. Twan Danielson  has a past surgical history that includes pr endometrial cryoablation; hx other surgical; hx bilateral salpingectomy; hx other surgical (11/2016); hx total laparoscopic hysterectomy; hx breast augmentation (2003); hx gyn (2000, 2004); hx dilation and curettage; hx pelvic laparoscopy (11/03/2016); hx oophorectomy (Left); hx other surgical (10/30/2018); and hx other surgical (11/06/2018). Social History/Living Environment:    Has 3 children, works during the day at [employer]  Prior Level of Function/Work/Activity:  Has been having pain for many years. Previous Treatment Approaches:          Pelvic floor therapy. Ambulatory/Rehab Services H2 Model Falls Risk Assessment    Risk Factors:       No Risk Factors Identified Ability to Rise from Chair:       (0)  Ability to rise in a single movement    Falls Prevention Plan:       No modifications necessary   Total: (5 or greater = High Risk): 0    ©2010 [facility]. All Rights Reserved. [facility] Patent #[id number]. Federal Law prohibits the replication, distribution or use without written permission from [facility] [employer] (formerly [employer])     Current Medications:    Current Outpatient Medications:     benzonatate (TESSALON) 100 mg capsule, , Disp: , Rfl: 0    pregabalin (LYRICA) 75 mg capsule, Take 75 mg by mouth two (2) times a day., Disp: , Rfl:     sertraline (ZOLOFT) 100 mg tablet, Take 150 mg by mouth nightly., Disp: , Rfl:     diazePAM (VALIUM) 10 mg tablet, 10 mg.  Vaginal suppository--uses every other night, Disp: , Rfl:     omega 3-DHA-EPA-fish oil 1,000 mg (120 mg-180 mg) capsule, Take 1 Cap by mouth., Disp: , Rfl:     melatonin 5 mg cap capsule, Take 10 mg by mouth nightly., Disp: , Rfl:     estradiol (ESTRACE) 1 mg tablet, TK 1 T PO QHS, Disp: , Rfl: 11    methen-sod phos-meth blue-hyos (UROGESIC-BLUE) 81.6-40.8-0.12 mg tab, May take 1-3 tabs per day as needed for bladder pain, Disp: 60 Tab, Rfl: 2    methocarbamol (ROBAXIN) 500 mg tablet, Take 500 mg by mouth two (2) times a day., Disp: , Rfl:     UBIDECARENONE/VITAMIN E MIXED (COQ10  PO), Take  by mouth nightly., Disp: , Rfl:     VIT B2/NIACIN/B6/B12/DEXPANTH (B COMPLEX SL), by SubLINGual route daily. , Disp: , Rfl:     traMADol (ULTRAM) 50 mg tablet, Take 1-2 Tabs by mouth every six (6) hours as needed for Pain. Max Daily Amount: 400 mg., Disp: 60 Tab, Rfl: 0    clonazePAM (KLONOPIN) 0.5 mg tablet, Take  by mouth nightly as needed. , Disp: , Rfl:     promethazine (PHENERGAN) 25 mg tablet, Take 25 mg by mouth every six (6) hours as needed for Nausea., Disp: , Rfl:     ibuprofen (MOTRIN) 800 mg tablet, Take 1 Tab by mouth every six (6) hours as needed. (Patient taking differently: Take 800 mg by mouth every six (6) hours as needed. Last dose 12/17/17), Disp: 30 Tab, Rfl: 0    magnesium oxide (MAG-OX) 400 mg tablet, Take 400 mg by mouth daily. , Disp: , Rfl:     loratadine (CLARITIN) 10 mg tablet, Take 10 mg by mouth every evening., Disp: , Rfl:     LACTOBACILLUS ACIDOPHILUS (PROBIOTIC PO), Take  by mouth., Disp: , Rfl:     multivitamin (ONE A DAY) tablet, Take 1 Tab by mouth daily. , Disp: , Rfl:    Date Last Reviewed:  8/14/2019   Voiding Patterns:  Patient voids every 4 hours during the day and 1 times during the night. Patient reports that she does not empties bladder. Does not leak. Fluid Intake:  Patient drinks 1 gallon of water per day. She consumes 1 cups of caffeinated beverages per day. Patient not limit fluid intake to control bladder. Bowel Habits:  Patient demonstrates constipation. Probiotic, citricel, linzes, mirilax, smooth move. Bowel movements are soft  Personal / Social History:  · Sexually active?  Yes, but very painful       Number of Personal Factors/Comorbidities that affect the Plan of Care: 3+: HIGH COMPLEXITY   EXAMINATION:   External Observation:     · Anal Gerald: present  · Skin Integrity: WNL  · Q-tip Test: Painful  Palpation:       8/14/2019  No palpation        Right Left   Bulbocavernosus Moderate tenderness Moderate tenderness   Ischocavernosus Min tenderness Min tenderness   Superficial Transverse Perineal Moderate tenderness m   Sphincter Uhrovaginal     Compressor Urethra      Obturator Internus NT due to vulvar pain after intercourse NT due to vulvar pain after intercourse   Iliococcygeus NT due to vulvar pain after intercourse NT due to vulvar pain after intercourse   Coccygeus     Pubococcygeus NT due to vulvar pain after intercourse NT due to vulvar pain after intercourse   Levator Ani NT due to vulvar pain after intercourse NT due to vulvar pain after intercourse   Adductor tenderness Tenderness    Glut medius  Tenderness at medial insersion   Lower abdomen/pelvis Moderate tenderness Significant tenderness   psoas Min tenderness Unable to palpate due to pain    iliacus Min tenderness Moderate tenderness    coccxy Moderate tenderness Significant tenderness               SEMG evaluation:   Date: 6/26/2019   Resting Tone: starting at 0.5   Quality of Resting Tone: good   5 Second Hold: 5 uV   10 Second Hold: NT uV   Quality of Recruitment: Good    Quality of Relaxation: Good    Quality of Holding: Fair    Stability of Hold: Fair    Stability of Rest: good after manual        Body Structures Involved:  1. Muscles Body Functions Affected:  1. Genitourinary  2. Neuromusculoskeletal Activities and Participation Affected:  1. Mobility  2. Self Care  3. Interpersonal Interactions and Relationships   Number of elements that affect the Plan of Care: 4+: HIGH COMPLEXITY   CLINICAL PRESENTATION:   Presentation: Evolving clinical presentation with unstable and unpredictable characteristics: HIGH COMPLEXITY   CLINICAL DECISION MAKING:   Outcome Measure: Tool Used: Pain Disability Index (PFDI-20)  Score:  Initial: 56 Most Recent: 47 (Date: 8/14/2019 )   Interpretation of Score: This survey asks questions concerning certain  bowel, bladder, or pelvic symptoms and how much this symptoms interfere with daily activiites.   Each section is scored on a 0-4 scale, 4 representing the greatest disability. The scores of each section are added together for a total score out of 70. Score 0 1-13 14-27 28-41 42-55 56-69 70   Modifier CH CI CJ CK CL CM CN     Medical Necessity:   · Patient is expected to demonstrate progress in coordination to decrease assistance required with pelvic floor drops. Reason for Services/Other Comments:  · Patient continues to require skilled intervention due to pelvic pain.    Use of outcome tool(s) and clinical judgement create a POC that gives a: Difficult prediction of patient's progress: HIGH COMPLEXITY   TREATMENT:

## 2019-08-14 NOTE — PROGRESS NOTES
Sabine Watkins Bob  : 1985  Primary: [employer] [employer]  Secondary:  [address]  at [place] [id number] [facility], [address] [place] [address] [address]  [phone]   [phone]       Diego Reyes  : 1985  Primary: [address] [employer]  Secondary:  [address]  at [address], [address] [place] [address] [address]  [phone]   [phone]       OUTPATIENT PHYSICAL THERAPY: Daily Treatment Note 2019  MEDICAL/REFERRING DIAGNOSIS:  Pelvic and perineal pain [R10.2]   REFERRING PHYSICIAN: Yancy Montes De Oca MD  Pre-treatment Symptoms/Complaints:   Pt states that she has more pain today. Did have intercourse yesterday. See evaluation for full symptom explanation. Pain: Initial:   7/10 vulvar pain   7/10 pelvic pain   Post Session:  7/10 vulva  6.5 pelvic pain      Medications Last Reviewed:  2019     TREATMENT:   THERAPEUTIC EXERCISE: (0 minutes):  Exercises per grid below to improve strength and coordination. Required moderate verbal and tactile cues to promote proper body breathing techniques. Progressed repetitions and complexity of movement as indicated. MANUAL THERAPY: (45  minutes): Soft tissue mobilization was utilized and necessary because of the patient's painful spasm and restricted motion of soft tissue. (Used abbreviations: MET - muscle energy technique; SCS- Strain counter strain; CTM- Connective tissue mobilizations; CR- Contract/relax; SP- Sustained pressure, TrP- Trigger point release, IASTM- Instrument assisted soft tissue mobilizations, TDN- Trigger point dry needling). : MODALITIES: (10 minutes): *  Hot Pack Therapy in order to provide analgesia and relieve muscle spasm. Hot pack applied to pelvis due to pain and increased mm tone    Skin intact after treatment    CTM to bilateral adductors with rolling and strumming. Nerve flossing to pudendal n.   SCS and Trigger point to bilateral deep transverse perineum and superficial transverse, levator ani and oI  perineum to reduce tone and improve  tissue elasticity. (not performed due to patient request)    CTM to sacrotuberous ligament, piriformis, and multifidus in prone  Right psoas release.      Exercises:  Patient instructed in pelvic floor exercises listed below:    Date:  8/14/2019   Activity/Exercise     Prone extension    Multifidus     Left external rotation stretch     Adductor stetch    Cat cow    rena pose    Hip flexor stretch    rena pose hands to the right and left    PF drop in supine with biofeedback assist for visual feedback via external anal sensors      Lumbar hip distraction     Hip flexion piriformis stretch        [employer] Portal  The following educational topics were reviewed with patient:  None today. Treatment/Session Summary:   · Response to Treatment: pt requested no internal work today. Externally, pt continues to make limited change. · Equipment provided today:  None today  · Recommendations/Intent for next treatment session: Next visit will focus on increase dilator if possible.     Treatment Plan of Care Effective Dates:  8/14/2019 to  11/12/2019   Total Treatment Billable Duration:  55 minutes     PT Patient Time In/Time Out  Time In: 9387  Time Out: [address] PT    Future Appointments   Date Time Provider Jose Cruz Maier   8/21/2019  1:00 PM Zohreh Serna PT [facility]   8/28/2019  1:00 PM Zohreh Serna PT [facility]   9/5/2019 10:00 AM Zohreh Serna PT [facility]   9/12/2019 10:00 AM Zohreh Serna PT [facility]   9/17/2019  9:00 AM Zohreh Serna PT [facility]   9/26/2019 10:00 AM Zohreh Serna PT [facility]   2/13/2020 10:00 AM MD Salvatore Perez

## 2019-08-30 ENCOUNTER — HOSPITAL ENCOUNTER (OUTPATIENT)
Dept: PHYSICAL THERAPY | Age: 34
Discharge: HOME | End: 2019-08-30
Payer: COMMERCIAL

## 2019-08-30 PROCEDURE — 97140 MANUAL THERAPY 1/> REGIONS: CPT

## 2019-08-30 NOTE — PROGRESS NOTES
Lalit Rojas  : 1985  Primary: Verludivina Figuero[employer]  Secondary:  [address]  at [facility]  [address] [facility], [address] [place] [address] [address]  [phone]   [phone]       Humberto Vázquez  : 1985  Primary: Caleb Figuero[employer]  Secondary:  [address]  at [facility]  [address] [facility], [address] [place] [address] [address]  [phone]   [phone]       OUTPATIENT PHYSICAL THERAPY: Daily Treatment Note 2019  MEDICAL/REFERRING DIAGNOSIS:  Pelvic and perineal pain [R10.2]   REFERRING PHYSICIAN: Sven Bucio MD  Pre-treatment Symptoms/Complaints:   Is taking pain meds but still having pain. Pain is across the low back, pelvis, and vulva. Energy level is low. Pt reports that she had a n block and 300 units of Botox. Pt reports having to have 4 weeks of pelvic rest, but pt does state that she is to have therapy. Pain: Initial:   /10 vulvar pain   7/10 pelvic pain   Post Session:  8/10 vulva  7/10 pelvis       Medications Last Reviewed:  2019      Right Left   Bulbocavernosus 5/10 5/10   Ischocavernosus 7/10 7/10   Superficial Transverse Perineal     Sphincter Uhrovaginal     Compressor Urethra      Obturator Internus 9/10 6/10   Iliococcygeus 8/10 8/10   Coccygeus     Pubococcygeus 4/10 0/10    Levator Ani     Adductor                      * indicates pain / 10     TREATMENT:   THERAPEUTIC EXERCISE: (0 minutes):  Exercises per grid below to improve strength and coordination. Required moderate verbal and tactile cues to promote proper body breathing techniques. Progressed repetitions and complexity of movement as indicated. MANUAL THERAPY: (25  minutes): Soft tissue mobilization was utilized and necessary because of the patient's painful spasm and restricted motion of soft tissue.     (Used abbreviations: MET - muscle energy technique; SCS- Strain counter strain; CTM- Connective tissue mobilizations; CR- Contract/relax; SP- Sustained pressure, TrP- Trigger point release, IASTM- Instrument assisted soft tissue mobilizations, TDN- Trigger point dry needling). : MODALITIES: ( minutes): *  Hot Pack Therapy in order to provide analgesia and relieve muscle spasm. Hot pack applied to pelvis due to pain and increased mm tone    Skin intact after treatment    CTM to bilateral adductors with rolling and strumming. Nerve flossing to pudendal n. SCS and Trigger point to bilateral deep transverse perineum and superficial transverse, levator ani and oI  perineum to reduce tone and improve  tissue elasticity  CTM to sacrotuberous ligament, piriformis, and multifidus in prone (not performed)  Right psoas release. (not performed)     Exercises:  Patient instructed in pelvic floor exercises listed below:    Date:  8/30/2019   Activity/Exercise     Prone extension    Multifidus     Left external rotation stretch     Adductor stetch    Cat cow    rena pose    Hip flexor stretch    rena pose hands to the right and left    PF drop in supine with biofeedback assist for visual feedback via external anal sensors      Lumbar hip distraction     Hip flexion piriformis stretch        [employer] Portal  The following educational topics were reviewed with patient:  None today. Treatment/Session Summary:   · Response to Treatment: Pt states that her pain did increase. Will recheck her mm tone again next week. No significant change in mm after Botox. · Equipment provided today:  None today  · Recommendations/Intent for next treatment session: Next visit will focus on increase dilator if possible.     Treatment Plan of Care Effective Dates:  8/14/2019 to  11/12/2019   Total Treatment Billable Duration:  30   minutes     PT Patient Time In/Time Out  Time In: 1300  Time Out: [facility], PT    Future Appointments   Date Time Provider Jose Cruz Maier   9/5/2019 10:00 AM Robert Greer PT [facility]   9/12/2019 10:00 AM Marilee Pimentel Betsy Schwab, PT [facility]   9/17/2019  9:00 AM Caryl Lombard, PT [facility]   9/26/2019 10:00 AM Caryl Lombard, PT [facility]   2/13/2020 10:00 AM MD Greyson Crespo

## 2019-09-03 NOTE — PROGRESS NOTES
I have reviewed the notes, assessments, and/or procedures performed by Devonna Epley, PT, I concur with her/his documentation of Briana Avery.

## 2019-09-05 ENCOUNTER — HOSPITAL ENCOUNTER (OUTPATIENT)
Dept: PHYSICAL THERAPY | Age: 34
Discharge: HOME | End: 2019-09-05
Payer: COMMERCIAL

## 2019-09-05 PROCEDURE — 97140 MANUAL THERAPY 1/> REGIONS: CPT

## 2019-09-05 NOTE — PROGRESS NOTES
Don Rojas  : 1985  Primary: [facility]  Secondary:  [address]  at [facility]  [address] [facility], [address] [place] [address] [address], [facility] [address]. 91.  [phone]   [phone]       Ruddy Sears  : 1985  Primary: [facility]  Secondary:  [address]  at [facility]  [address] [facility], [address] [place] [address] [address], [facility] [address].  [phone]   [phone]       OUTPATIENT PHYSICAL THERAPY: Daily Treatment Note 2019  MEDICAL/REFERRING DIAGNOSIS:  Pelvic and perineal pain [R10.2]   REFERRING PHYSICIAN: Elizabeth Devlin MD  Pre-treatment Symptoms/Complaints:  Feeling better. Was able to walk 2.5 miles yesterday. Feels some fullness at pelvis. Pain: Initial:  7 /10 vulvar pain   6/10 pelvic pain   Post Session:  7/10 vulva  6/10 pelvis       Medications Last Reviewed:  2019      Right Left   Bulbocavernosus /10 5/10   Ischocavernosus /10 7/10   Superficial Transverse Perineal /10 7/10   Sphincter Uhrovaginal     Compressor Urethra      Obturator Internus 9/10 9/10   Iliococcygeus /10 7/10   Coccygeus     Pubococcygeus /10 5/10    Levator Ani     Adductor                      * indicates pain / 10     TREATMENT:   THERAPEUTIC EXERCISE: (3  minutes):  Exercises per grid below to improve strength and coordination. Required moderate verbal and tactile cues to promote proper body breathing techniques. Progressed repetitions and complexity of movement as indicated. MANUAL THERAPY: (40   minutes): Soft tissue mobilization was utilized and necessary because of the patient's painful spasm and restricted motion of soft tissue. (Used abbreviations: MET - muscle energy technique; SCS- Strain counter strain; CTM- Connective tissue mobilizations; CR- Contract/relax; SP- Sustained pressure, TrP- Trigger point release, IASTM- Instrument assisted soft tissue mobilizations, TDN- Trigger point dry needling).    : MODALITIES: (10 minutes):      * Cold Pack Therapy in order to reduce inflammation and edema. Cold pack applied to the vulva to decrease pain and inflammation. CTM to bilateral adductors with rolling and strumming. Nerve flossing to pudendal n. SCS and Trigger point to bilateral deep transverse perineum and superficial transverse, levator ani and oI  perineum to reduce tone and improve  tissue elasticity  CTM to sacrotuberous ligament, piriformis, and multifidus in prone (not performed)  Right psoas release. (not performed)     Exercises:  Patient instructed in pelvic floor exercises listed below:    Date:  9/5/2019   Activity/Exercise     Prone extension    Multifidus     Left external rotation stretch     Adductor stetch    Cat cow    rena pose    Hip flexor stretch    rena pose hands to the right and left    PF drop in supine with biofeedback assist for visual feedback via external anal sensors      Lumbar hip distraction     Hip adduction  X 10        [employer] Portal  The following educational topics were reviewed with patient:  None today. Treatment/Session Summary:   · Response to Treatment: Pt states that the pain was different but still has the same intensity. . Will recheck her mm tone again next week. No significant change in mm after Botox. · Equipment provided today:  None today  · Recommendations/Intent for next treatment session: Next visit will focus on increase dilator if possible.     Treatment Plan of Care Effective Dates:  8/14/2019 to  11/12/2019   Total Treatment Billable Duration:  43   minutes     PT Patient Time In/Time Out  Time In: 1000  Time Out: Endy [address], PT    Future Appointments   Date Time Provider Jose Cruz Maier   9/12/2019 10:00 AM Ronaldo Lopez, PT [facility]   9/17/2019  9:00 AM Ronaldo Lopez, PT [facility]   9/26/2019 10:00 AM Ronaldo Lopez, PT [facility]   2/13/2020 10:00 AM MD Rod Meade

## 2019-09-12 ENCOUNTER — HOSPITAL ENCOUNTER (OUTPATIENT)
Dept: PHYSICAL THERAPY | Age: 34
Discharge: HOME | End: 2019-09-12
Payer: COMMERCIAL

## 2019-09-12 PROCEDURE — 97140 MANUAL THERAPY 1/> REGIONS: CPT

## 2019-09-17 ENCOUNTER — HOSPITAL ENCOUNTER (OUTPATIENT)
Dept: PHYSICAL THERAPY | Age: 34
Discharge: HOME | End: 2019-09-17
Payer: COMMERCIAL

## 2019-09-17 PROCEDURE — 97140 MANUAL THERAPY 1/> REGIONS: CPT

## 2019-09-17 NOTE — PROGRESS NOTES
Juan Jose Rojas  : 1985  Primary: Natasha Mckenzie  Secondary:  [address]  at [facility]  [address] [facility], [address] [place] [address] [address], [place] [address].  [phone]   [phone]       Pavel Rose  : 1985  Primary: Natasha Mckenzie  Secondary:  [address]  at [facility]  27[address] [facility], [address] [facility] [address], [place] [address].  [phone]   [phone]       OUTPATIENT PHYSICAL THERAPY: Daily Treatment Note 2019  MEDICAL/REFERRING DIAGNOSIS:  Pelvic and perineal pain [R10.2]   REFERRING PHYSICIAN: Romy Pinedo MD   POC 2019 to 2019  Pre-treatment Symptoms/Complaints: Pt is doing the vel oil packs. Has done it for the past three days. Pain: Initial:  7 /10 vulvar pain   6/10 pelvic pain   Post Session:  7/10 vulva  6/10 pelvis       Medications Last Reviewed:  2019      Right Left   Bulbocavernosus 4/10 5/10   Ischocavernosus 6/10 6/10   Superficial Transverse Perineal 6/10 7/10   Sphincter Uhrovaginal     Compressor Urethra      Obturator Internus NA NA   Iliococcygeus NA unable to tolerate palpation  NA unable to tolerated palaption   Coccygeus     Pubococcygeus 4/10 5/10    Levator Ani     Adductor Decreased tone noted Decreased tone noted                    * indicates pain / 10     TREATMENT:   THERAPEUTIC EXERCISE: (0 minutes):  Exercises per grid below to improve strength and coordination. Required moderate verbal and tactile cues to promote proper body breathing techniques. Progressed repetitions and complexity of movement as indicated. MANUAL THERAPY: (43   minutes): Soft tissue mobilization was utilized and necessary because of the patient's painful spasm and restricted motion of soft tissue.     (Used abbreviations: MET - muscle energy technique; SCS- Strain counter strain; CTM- Connective tissue mobilizations; CR- Contract/relax; SP- Sustained pressure, TrP- Trigger point release, IASTM- Instrument assisted soft tissue mobilizations, TDN- Trigger point dry needling). : MODALITIES: (0 minutes):      *  Cold Pack Therapy in order to reduce inflammation and edema. Cold pack applied to the vulva to decrease pain and inflammation. Gentle sinking, combining planes and tensioning into to abdomen to decrease tension at connective tissue and fascia  Gentle tension loading to scar of laparoscopy at abdomen  CTM to bilateral adductors with rolling and strumming. Nerve flossing to pudendal n. SCS and Trigger point to bilateral deep transverse perineum and superficial transverse, levator ani and oI  perineum to reduce tone and improve  tissue elasticity (unable to tolerate palpation to OI or perineum. CTM to sacrotuberous ligament, piriformis, and multifidus in prone (not performed)  Right psoas release. (not performed)     Exercises:  Patient instructed in pelvic floor exercises listed below:    Date:  9/17/2019   Activity/Exercise     Prone extension    Multifidus     Left external rotation stretch     Adductor stetch    Cat cow    rena pose    Hip flexor stretch    rena pose hands to the right and left    PF drop in supine with biofeedback assist for visual feedback via external anal sensors      Lumbar hip distraction     Hip adduction         [employer] Portal  The following educational topics were reviewed with patient:  None today. Treatment/Session Summary:   · Response to Treatment: Pt 15 min late today. No significant change noted at this time. Did have some increased discomfort at left lower quadrant. · Equipment provided today:  None today  · Recommendations/Intent for next treatment session: Next visit will focus on increase dilator if possible.     Treatment Plan of Care Effective Dates:  8/14/2019 to  11/12/2019   Total Treatment Billable Duration:  43   minutes     PT Patient Time In/Time Out  Time In: 0810  Time Out: [address], PT    Future Appointments   Date Time Provider Jose Cruz Maier   9/25/2019  9:00 AM Natalee Conway, PT [facility]   10/4/2019 12:00 PM Natalee Conway, PT [facility]   10/10/2019  9:00 AM Natalee Conway, PT [facility]   10/18/2019 12:00 PM Natalee Conway, PT [facility]   10/25/2019 12:00 PM Natalee Conway, PT [facility]   11/1/2019 12:00 PM Natalee Conway, PT [facility]   2/13/2020 10:00 AM MD Raad Garduno

## 2019-09-25 ENCOUNTER — HOSPITAL ENCOUNTER (OUTPATIENT)
Dept: PHYSICAL THERAPY | Age: 34
Discharge: HOME | End: 2019-09-25
Payer: COMMERCIAL

## 2019-09-25 PROCEDURE — 97140 MANUAL THERAPY 1/> REGIONS: CPT

## 2019-09-25 NOTE — PROGRESS NOTES
Андрей Rojas  : 1985  Primary: [facility]  Secondary:  [address]  at [facility]  [address] [facility], [address] [place] [address] [address], [address].  [phone]   [phone]       Angela Bradshaw  : 1985  Primary: [facility]  Secondary:  [address]  at [facility]  27[address] [facility], [address] [facility] [address], [address].  [phone]   [phone]       OUTPATIENT PHYSICAL THERAPY: Daily Treatment Note 2019  MEDICAL/REFERRING DIAGNOSIS:  Pelvic and perineal pain [R10.2]   REFERRING PHYSICIAN: Savanna Reese MD  Pre-treatment Symptoms/Complaints: Pt states that's she has some anxiety not that she is 4 weeks post op and will have to start dilators and participate in intercourse. Has been using the vel oil packs 3 days prior to PT. Pain: Initial:  7 /10 vulvar pain   6/10 pelvic pain   Post Session:  7/10 vulva  6/10 pelvis       Medications Last Reviewed:  2019      Right Left   Bulbocavernosus tenderness tenderness   Ischocavernosus tenderness tenderness   Superficial Transverse Perineal tenderness tenderness   Sphincter Uhrovaginal     Compressor Urethra      Obturator Internus 3/10 Radiating discomfort to the left lower quadrant   Iliococcygeus tenderness tenderness   Coccygeus     Pubococcygeus     Levator Ani tenderness tenderness   Adductor Decreased tone noted Decreased tone noted                    * indicates pain / 10     TREATMENT:   THERAPEUTIC EXERCISE: (0 minutes):  Exercises per grid below to improve strength and coordination. Required moderate verbal and tactile cues to promote proper body breathing techniques. Progressed repetitions and complexity of movement as indicated. MANUAL THERAPY: (53   minutes): Soft tissue mobilization was utilized and necessary because of the patient's painful spasm and restricted motion of soft tissue.     (Used abbreviations: MET - muscle energy technique; SCS- Strain counter strain; CTM- Connective tissue mobilizations; CR- Contract/relax; SP- Sustained pressure, TrP- Trigger point release, IASTM- Instrument assisted soft tissue mobilizations, TDN- Trigger point dry needling). : MODALITIES: (8 minutes):      *  Cold Pack Therapy in order to reduce inflammation and edema. Cold pack applied to the vulva to decrease pain and inflammation. Gentle sinking, combining planes and tensioning into to abdomen to decrease tension at connective tissue and fascia  Gentle tension loading to scar of laparoscopy at abdomen  CTM to bilateral adductors with rolling and strumming.  (not performed)   Nerve flossing to pudendal n. SCS and Trigger point to bilateral deep transverse perineum and superficial transverse, levator ani and oI  perineum to reduce tone and improve  tissue elasticity  CTM to sacrotuberous ligament, piriformis, and multifidus in prone (not performed)  Right psoas release. (not performed)     Exercises:  Patient instructed in pelvic floor exercises listed below:    Date:  9/25/2019   Activity/Exercise     Prone extension    Multifidus     Left external rotation stretch     Adductor stetch    Cat cow    rena pose    Hip flexor stretch    rena pose hands to the right and left    PF drop in supine with biofeedback assist for visual feedback via external anal sensors      Lumbar hip distraction     Hip adduction         [employer] Portal  The following educational topics were reviewed with patient:  None today. Treatment/Session Summary:   · Response to Treatment: Pt continues to have increase pain at the abdomen and the vulvar. Has made good improvements with week with palaption at the pelvic floor mm. · Equipment provided today:  None today  · Recommendations/Intent for next treatment session: Next visit will focus on increase dilator if possible.     Treatment Plan of Care Effective Dates:  8/14/2019 to  11/12/2019   Total Treatment Billable Duration:  60   minutes PT Patient Time In/Time Out  Time In: 0900  Time Out: [address], PT    Future Appointments   Date Time Provider Jose Cruz Maier   10/4/2019 12:00 PM Alen Baum, PT [facility]   10/10/2019  9:00 AM Alen Baum, PT [facility]   10/18/2019 12:00 PM Alen Baum, PT [facility]   10/25/2019 12:00 PM Alen Baum, PT [facility]   11/1/2019 12:00 PM Alendeny Baum, PT [facility]   2/13/2020 10:00 AM MD Hawa Scanlon

## 2019-10-04 ENCOUNTER — HOSPITAL ENCOUNTER (OUTPATIENT)
Dept: PHYSICAL THERAPY | Age: 34
Discharge: HOME | End: 2019-10-04
Payer: COMMERCIAL

## 2019-10-04 PROCEDURE — 97110 THERAPEUTIC EXERCISES: CPT

## 2019-10-04 NOTE — PROGRESS NOTES
Nabil Pena Bob  : 1985  Primary: Bob Mckenzie  Secondary:  [address] [place]  at [facility]  [address] [facility], [address] [place] [address] [address], [address]  [phone]   [phone]       Beverly Finn  : 1985  Primary: Bob Trujillo [facility]  Secondary:  [address]  at [facility]  [address] [facility], [address] [place] [address] [address], [address]  [phone]   [phone]       OUTPATIENT PHYSICAL THERAPY: Daily Treatment Note 10/4/2019  MEDICAL/REFERRING DIAGNOSIS:  Pelvic and perineal pain [R10.2]   REFERRING PHYSICIAN: Tiera Biggs MD  Pre-treatment Symptoms/Complaints: Pt has been having some vaginal bleeding. Did try to have itercourse. Lasted about 10 min and was in pain for about 2 days   Pain: Initial:  7 /10 vulvar pain   6/10 pelvic pain   Post Session:  7/10 vulva  6/10 pelvis       Medications Last Reviewed:  10/4/2019      Right Left   Bulbocavernosus tenderness tenderness   Ischocavernosus tenderness tenderness   Superficial Transverse Perineal tenderness tenderness   Sphincter Uhrovaginal     Compressor Urethra      Obturator Internus 3/10 Radiating discomfort to the left lower quadrant   Iliococcygeus tenderness tenderness   Coccygeus     Pubococcygeus     Levator Ani tenderness tenderness   Adductor Decreased tone noted Decreased tone noted                    * indicates pain / 10     TREATMENT:   THERAPEUTIC EXERCISE: (0 minutes):  Exercises per grid below to improve strength and coordination. Required moderate verbal and tactile cues to promote proper body breathing techniques. Progressed repetitions and complexity of movement as indicated. MANUAL THERAPY: (55   minutes): Soft tissue mobilization was utilized and necessary because of the patient's painful spasm and restricted motion of soft tissue.     (Used abbreviations: MET - muscle energy technique; SCS- Strain counter strain; CTM- Connective tissue mobilizations; CR- Contract/relax; SP- Sustained pressure, TrP- Trigger point release, IASTM- Instrument assisted soft tissue mobilizations, TDN- Trigger point dry needling). : MODALITIES: (8 minutes):      *  Cold Pack Therapy in order to reduce inflammation and edema. Cold pack applied to the vulva to decrease pain and inflammation. Gentle sinking, combining planes and tensioning into to abdomen to decrease tension at connective tissue and fascia  Gentle tension loading to scar of laparoscopy at abdomen  CTM to bilateral adductors with rolling and strumming. Nerve flossing to pudendal n. SCS and Trigger point to bilateral deep transverse perineum and superficial transverse, levator ani and oI  perineum to reduce tone and improve  tissue elasticity  CTM to sacrotuberous ligament, piriformis, and multifidus in prone (not performed)  Right psoas release. (not performed)     Exercises:  Patient instructed in pelvic floor exercises listed below:    Date:  10/4/2019   Activity/Exercise     Prone extension    Multifidus     Left external rotation stretch     Adductor stetch    Cat cow    rena pose    Hip flexor stretch    rena pose hands to the right and left    PF drop in supine with biofeedback assist for visual feedback via external anal sensors      Lumbar hip distraction     Hip adduction         [employer] Portal  The following educational topics were reviewed with patient:  None today. Treatment/Session Summary:   · Response to Treatment:Pt continues to have increased pain with palpation at the pelvis and pelvic floor. Progressing slowly. · Equipment provided today:  None today  · Recommendations/Intent for next treatment session: Next visit will focus on increase dilator if possible.     Treatment Plan of Care Effective Dates:  8/14/2019 to  11/12/2019   Total Treatment Billable Duration:  60   minutes     PT Patient Time In/Time Out  Time In: 1200  Time Out: [address] PT    Future Appointments   Date Time Provider Jose Cruz Maier   10/10/2019  9:00 AM Craige Feather, PT [facility]   10/18/2019 12:00 PM Craige Feather, PT [facility]   10/25/2019 12:00 PM Craige Feather, PT [facility]   11/1/2019 12:00 PM Craige Feather, PT [facility] [place]   2/13/2020 10:00 AM MD Lindsey Gay Edmondson

## 2019-10-08 NOTE — PROGRESS NOTES
I have reviewed the notes, assessments, and/or procedures performed by PT, I concur with her/his documentation of Christy Culver.

## 2019-10-10 ENCOUNTER — HOSPITAL ENCOUNTER (OUTPATIENT)
Dept: PHYSICAL THERAPY | Age: 34
Discharge: HOME | End: 2019-10-10
Payer: COMMERCIAL

## 2019-10-10 PROCEDURE — 97140 MANUAL THERAPY 1/> REGIONS: CPT

## 2019-10-10 NOTE — PROGRESS NOTES
Simon Rojas  : 1985  Primary: Kenya Haq [facility]  Secondary:  [address] at [place]  [address] [place] [address], [address] [place] [address] [address], [address]  [phone]   [phone]       Derrick Rizvi  : 1985  Primary: Kenya Haq [facility]  Secondary:  [address] at [place] [address] [facility], [address] [facility] [address], [address]  [phone]   [phone]       OUTPATIENT PHYSICAL THERAPY: Daily Treatment Note 10/10/2019  MEDICAL/REFERRING DIAGNOSIS:  Pelvic and perineal pain [R10.2]   REFERRING PHYSICIAN: Aliza Patricia MD  Pre-treatment Symptoms/Complaints: Pt states that her left quadrant is more painful. Pain: Initial:  7 /10 vulvar pain   6/10 pelvic pain   Post Session:  7/10 vulva  6/10 pelvis       Medications Last Reviewed:  10/10/2019      Right Left   Bulbocavernosus tenderness tenderness   Ischocavernosus tenderness tenderness   Superficial Transverse Perineal tenderness tenderness   Sphincter Uhrovaginal     Compressor Urethra      Obturator Internus 3/10 Radiating discomfort to the left lower quadrant   Iliococcygeus tenderness tenderness   Coccygeus     Pubococcygeus     Levator Ani tenderness tenderness   Adductor Decreased tone noted Decreased tone noted                    Small mole noted on right sided labia major noted today. 10/10/2019  * indicates pain / 10     TREATMENT:   THERAPEUTIC EXERCISE: (0 minutes):  Exercises per grid below to improve strength and coordination. Required moderate verbal and tactile cues to promote proper body breathing techniques. Progressed repetitions and complexity of movement as indicated. MANUAL THERAPY: (55   minutes): Soft tissue mobilization was utilized and necessary because of the patient's painful spasm and restricted motion of soft tissue.     (Used abbreviations: MET - muscle energy technique; SCS- Strain counter strain; CTM- Connective tissue mobilizations; CR- Contract/relax; SP- Sustained pressure, TrP- Trigger point release, IASTM- Instrument assisted soft tissue mobilizations, TDN- Trigger point dry needling). : MODALITIES: (8 minutes):      *  Cold Pack Therapy in order to reduce inflammation and edema. Cold pack applied to the vulva to decrease pain and inflammation. Gentle sinking, combining planes and tensioning into to abdomen to decrease tension at connective tissue and fascia  Gentle tension loading to scar of laparoscopy at abdomen  CTM to bilateral adductors with rolling and strumming. Nerve flossing to pudendal n. SCS and Trigger point to bilateral deep transverse perineum and superficial transverse, levator ani and oI  perineum to reduce tone and improve  tissue elasticity  Bilateral lumbar distraction in prone   CTM to sacrotuberous ligament, piriformis, and multifidus in prone (not performed)  Right psoas release. (not performed)     Exercises:  Patient instructed in pelvic floor exercises listed below:    Date:  10/10/2019   Activity/Exercise     Prone extension    Multifidus     Left external rotation stretch     Adductor stetch    Cat cow    rena pose    Hip flexor stretch    rena pose hands to the right and left    PF drop in supine with biofeedback assist for visual feedback via external anal sensors      Lumbar hip distraction     Hip adduction         [employer] Portal  The following educational topics were reviewed with patient:  None today. Treatment/Session Summary:   · Response to Treatment: Pt instructed to bring dilators next visit. Will also clear iliac spine next visit. Pt continues to present with slow progression. · Equipment provided today:  None today  · Recommendations/Intent for next treatment session: Next visit will focus on increase dilator if possible.     Treatment Plan of Care Effective Dates:  8/14/2019 to  11/12/2019   Total Treatment Billable Duration:  55   minutes     PT Patient Time In/Time Out  Time In: 0900  Time Out: [address] [facility], PT    Future Appointments   Date Time Provider Jose Cruz Livier   10/18/2019 12:00 PM Louisa Glover, PT [facility]   10/25/2019 12:00 PM Louisa Glover, PT [facility]   11/1/2019 12:00 PM Louisa Glover, PT [facility]   11/8/2019 11:00 AM Louisa Glover, PT [facility]   11/15/2019  1:00 PM Louisa Glover, PT [facility]   11/22/2019 11:00 AM Louisa Glover, PT [facility]   11/27/2019  1:00 PM Louisa Glover, PT [facility]   2/13/2020 10:00 AM Dillon Cushing, MD Pal[facility] Cocking

## 2019-10-18 ENCOUNTER — HOSPITAL ENCOUNTER (OUTPATIENT)
Dept: PHYSICAL THERAPY | Age: 34
Discharge: HOME | End: 2019-10-18
Payer: COMMERCIAL

## 2019-10-18 PROCEDURE — 97140 MANUAL THERAPY 1/> REGIONS: CPT

## 2019-10-18 NOTE — PROGRESS NOTES
Jenel Aschoff Sterling  : 1985  Primary: Olesya Snell [facility]  Secondary:  [address]  at [facility]  [facility], [address], [place] [address]  [phone]   [phone]       Yaz Maddox  : 1985  Primary: Olesya [facility]  Secondary:  [address]  at [facility] 52, [address], [address]  [phone]   [phone]       OUTPATIENT PHYSICAL THERAPY: Daily Treatment Note 10/18/2019  MEDICAL/REFERRING DIAGNOSIS:  Pelvic and perineal pain [R10.2]   REFERRING PHYSICIAN: Diego Pride MD  Pre-treatment Symptoms/Complaints: Pt states that she had intercourse and she reprots feeling like their is a razor blade cutting in opening. Is unable have deep penetration. Did not do dilators. Popped the blood vessle on the right labia. Did have intercourse last week and she states that she was high on drugs. She states that her  is able to enter the vaginal opening about 3-4 inches. Pain: Initial:  7 /10 vulvar pain   6/10 pelvic pain   Post Session:  7/10 vulva  6/10 pelvis       Medications Last Reviewed:  10/18/2019     See evaluation note from today      * indicates pain / 10     TREATMENT:   THERAPEUTIC EXERCISE: (0 minutes):  Exercises per grid below to improve strength and coordination. Required moderate verbal and tactile cues to promote proper body breathing techniques. Progressed repetitions and complexity of movement as indicated. MANUAL THERAPY: (55   minutes): Soft tissue mobilization was utilized and necessary because of the patient's painful spasm and restricted motion of soft tissue. (Used abbreviations: MET - muscle energy technique; SCS- Strain counter strain; CTM- Connective tissue mobilizations; CR- Contract/relax; SP- Sustained pressure, TrP- Trigger point release, IASTM- Instrument assisted soft tissue mobilizations, TDN- Trigger point dry needling).    : MODALITIES: (0 minutes): *  Cold Pack Therapy in order to reduce inflammation and edema. Cold pack applied to the vulva to decrease pain and inflammation. Gentle sinking, combining planes and tensioning into to abdomen to decrease tension at connective tissue and fascia  Gentle tension loading to scar of laparoscopy at abdomen  CTM to bilateral adductors with rolling and strumming. Nerve flossing to pudendal n. SCS and Trigger point to bilateral deep transverse perineum and superficial transverse, levator ani and oI  perineum to reduce tone and improve  tissue elasticity  Bilateral lumbar distraction in prone   CTM to sacrotuberous ligament, piriformis, and multifidus in prone (not performed)  Right psoas release. (not performed)     Exercises:  Patient instructed in pelvic floor exercises listed below:    Date:  10/18/2019   Activity/Exercise     Prone extension    Multifidus     Left external rotation stretch     Adductor stetch    Cat cow    rena pose    Hip flexor stretch    rena pose hands to the right and left    PF drop in supine with biofeedback assist for visual feedback via external anal sensors      Lumbar hip distraction     Hip adduction         MedBridge Portal  The following educational topics were reviewed with patient:  None today. Treatment/Session Summary:   · Response to Treatment: Still waiting for pt to bring dilators. Pt continues to present with slow progression. · Equipment provided today:  None today  · Recommendations/Intent for next treatment session: Next visit will focus on increase dilator if possible.     Treatment Plan of Care Effective Dates: 10/18/2019 to 1/16/2020    Total Treatment Billable Duration:  55   minutes     PT Patient Time In/Time Out  Time In: 1200  Time Out: Eva 48, PT    Future Appointments   Date Time Provider Jose Cruz Maier   10/25/2019 12:00 PM Steven Morales, PT [facility]   11/1/2019 12:00 PM Steven Morales, PT [facility]   11/8/2019 11:00 AM Thelma Tejada, PT [facility]   11/15/2019  1:00 PM Thelma Tejada, PT [facility]   11/22/2019 11:00 AM Thelma Tejada, PT [facility]   11/27/2019  1:00 PM Thelma Tejada, PT [facility]   2/13/2020 10:00 AM MD Jose Enrique Polo

## 2019-10-21 NOTE — THERAPY RECERTIFICATION
Antonia Voss Bob  : 1985  Primary: Avi Mckenzie  Secondary:  [address] at [place] [id number] [facility], [address] [place] [address],[address], [facility] [address].  [phone]   [phone]          OUTPATIENT PHYSICAL THERAPY:Recertification     ICD-10: Treatment Diagnosis: Myalgia (M79.1)Fibromyalgia (M79.7)Other lack of coordination (R27.8)   Precautions/Allergies:   Codeine; Dermagraft [cult skin subst, human-bovine]; and Percocet [oxycodone-acetaminophen]   MD Orders: Eval and treat MEDICAL/REFERRING DIAGNOSIS:  Pelvic and perineal pain [R10.2]   DATE OF ONSET: Chronic  REFERRING PHYSICIAN: Atif Miles MD  RETURN PHYSICIAN APPOINTMENT: TBD      RE-Evaluation:  10/18/2019  Ms. Anurag Hall has been seen in PT since 2019. She has now been post Botox x2. She her progress seems to minimal even after the Botox. Functionally, she continues to be unable to exercise without increased discomfort. She continues to have coccyx and low back. She is unable to participate in intercourse. With gentle palpation throughout the pelvic floor mm, she continues to report significant amount of pain. I feel at this point, therapy has not been significantly helpful. She does respond well the soft tissue at the adductors. She might benefit from a massage therapist.  According to Alexandra Muniz, she reports that Dr. Maria Eugenia Denise would like her to attend therapy due to the recent Botox injections. I feel that normally therapy would be important as well as dilator use. She has been non complaint with her dilators. I am awaiting a follow up with Dr. Maria Eugenia Denise, but at this point, she has not made an appointment. At, this point,  I will see Alexandra Muniz for 1 x per week one more month to see if any changes occur from the Botox. At that point, I will re-assess for possible D/C. RE-Evaluation:  2019  Ms. Anurag Hall has been seen in physical therapy since 2019. Leslie Gary  She recently had botox injections with Dr. Briana Pizarro back in March. I feel with palpation the tissue feel pliable with minimal tone, but Ms. Rojas continue to have significant pan with even gentle palpation throughout the pelvic floor mm. Her coccyx pain remains unchanged. She did have improvements on her self report Pain Disability Index. She will return to Dr. Briana Pizarro for increased botox treatments soon. I am hopeful that this round of injections will produce a greater result. Pt continues to have poor rehab potential. She is slowly working towards her PT goals. Continue PT 1x / week to address pelvis and vulvar impairments. Re-Evaluation  5/10/2019  Ms. Maggie Campuzano has been to physical therapy since 11/30/2019 for 20 visits. She continues to progress slowly but steadily. She is now able to tolerate palpation into the pelvic floor for up to 8 min with 1 digit. We are now able to palpate bilateral levator ani and obturator internus. She is able to participate in penetrative intercourse, but continues to have pain. She has been instructed to attempt dilator insertion to and add friction if able to do so. She recently was advised to consider a x-ray of her coccyx which reveal a posterior inferior sacral fracture and some type of artifact in the left abdominal region. She will be sending her images to her Dr. Tatianna Ruiz in [place]. She also saw a URO/GYN in [facility] and will be having Botox, n. Block, and Due to her deficits she continues to be a good candidate for PT. Continue PT 1 x week. Re-Evaluation  2/8/2019  Ms. Rojas has been to therapy for 10 visits one time per week. She has made minimal gains with PT. She presents with good resting tone with biofeedback. She is only able to tolerate insertion of 1 digit into the pelvic floor for 5 min. After her visit, she reports discomfort for 3-4 hours. She has been able to tolerate active gentle exercises such as the Nu-step.   She continues to have increased pain with palpation throughout the superficial and deep layers of the pelvic floor left greater than right. She did have penetrative intercourse with her  a few weeks ago resulting in pain for 2 days. She has many barriers for rehab which include numerous surgeries for endometriosis and previous attempts with PT. I expect slow and steady progress. She is motivated with PT. Due to her deficits she continues to be a good candidate for PT. INITIAL ASSESSMENT:  Ms. José Deleon returns to therapy after having surgery with Dr. Diego Moreno and botox with Dr. Christopher Voss. She is now 3 weeks post op her endometrial surgery. She had previous Botox prior to surgery in the pelvic floor mm. . Her surgical and therapy history is very long and complex. She has had multiple surgery's and multiple encoutners with pelvic floor rehabilitation. Today in the clinic, I was only able to palpate at the introitus and the first layer of pelvic floor mm. Pt reported significant pain with palpation at the vaginal opening, labia majora, and abdomen. Therefore, I suggested we wait two weeks until her next visit. She was agreeable with POC. Pt is an excellent candidate for pelvic floor therapy due to her limitations with sitting, standing, penetrative intercourse, PMH, and significant level of pain. Pt would benefit from skilled PT. PROBLEM LIST (Impacting functional limitations):  1. Decreased Strength  2. Decreased ADL/Functional Activities  3. Increased Pain  4. Decreased Flexibility/Joint Mobility  5. Decreased Andrews with Home Exercise Program INTERVENTIONS PLANNED:  1. Neuromuscular re-education  2. Biofeedback as needed  3. HEP  4. Bladder retraining  5. Bladder education  6. Electrical Stimulation  7. Home Exercise Program (HEP)  8. Manual Therapy  9. Range of Motion (ROM)  10. Therapeutic Exercise/Strengthening  11. Ultrasound (US)  12. low level light therapy   TREATMENT PLAN:  Effective Dates:  10/18/2019 to 1/16/2020 (90 days). Frequency/Duration: 1 times a week for 90 Days  GOALS: (Goals have been discussed and agreed upon with patient.)  Short-Term Functional Goals: Time Frame: in 4 weeks  1. Patient will demonstrate independence with home exercise program.(met)  Discharge Goals: Time Frame:in 12 weeks  1. Pt able to tolerate size 2 Amielle dilator for 8 min in order to tolerate vaginal penetration. (working towards)  2. Pt report pain with palpation on the left side of the pelivc floor less than 2/10. (not met)  3. Pt will tolerate 30 min of aerobic activity without increased pelvic pain. (working towards)  Rehabilitation Potential For Stated Goals: Poor  Regarding Dayana Rojas's therapy, I certify that the treatment plan above will be carried out by a therapist or under their direction. Thank you for this referral,  Angelo Horta, PT     Referring Physician Signature: Rd Galan MD              Date                    The information in this section was collected on 10/21/2019  (except where otherwise noted). HISTORY:   History of Present Injury/Illness (Reason for Referral):  Dx at 13 with endometriosis. That was when she had her first laprascropy. Has had 4 laparoscopy. First vaginal delivery 2009. Second child was born 2010. Had a vaginal delivery. Did do an ablation  which lasted for about one year. 2014 had a hysterectomy. Got sick after surgery. 8 weeks later had a vaginal dehiscence. Was septic then. Did not rebuild the cuff. The endo starts to attach the vaginal cuff. Started seeing Brandi Wynne for pelvic floor therapy. Pancho Fishman is having difficulty with palpation. Nov 2016 had a excision surgery for endometriosis. They build up the cuff. They tacked the left ovary. Eventually took out the left ovary in 2017 and found endo in the pelvis. In 2017, was dx with fibro. Brisa Colvin tried to ablation of the ilioinguinal nerve. Had necrosis around the hip bone.  Recently had Botox with . Dr Mikki Hall had a cytoscopy and the bladder was healthy. Went to see Dr. Nilo Varela to  redo surgery in the pelvis for possible endo. Deadend nerve and it feels better. Vaginal valium in the past was helpful. Needling does help pain, soft tissue, and deep tissues. Unable to stand for any period of time. Does better with movement. Had tailbone pain for a while and does use cushions. Feels burring at the [facility]. 8/14/2019  Pt states that she feels like the botox is wearing off as she feels that the labia feel more heavy and painful. Did have intercourse with her  and she feels very sore in the vulvar area. Past Medical History/Comorbidities:   Ms. Quinton Corona  has a past medical history of Anxiety (9/6/2013), Bladder pain, Chronic pain, Endometriosis, Gluten intolerance, High-tone pelvic floor dysfunction, IBS (irritable bowel syndrome), Migraine, Nausea & vomiting, Ovarian cyst, PUD (peptic ulcer disease) (12/2013 ), Unspecified adverse effect of anesthesia, UTI (urinary tract infection), and Vaginal cuff dehiscence. Ms. Quinton Corona  has a past surgical history that includes pr endometrial cryoablation; hx other surgical; hx bilateral salpingectomy; hx other surgical (11/2016); hx total laparoscopic hysterectomy; hx breast augmentation (2003); hx gyn (2000, 2004); hx dilation and curettage; hx pelvic laparoscopy (11/03/2016); hx oophorectomy (Left); hx other surgical (10/30/2018); and hx other surgical (11/06/2018). Social History/Living Environment:    Has 3 children, works during the day at [employer]  Prior Level of Function/Work/Activity:  Has been having pain for many years. Previous Treatment Approaches:          Pelvic floor therapy.       Ambulatory/Rehab Services H2 Model Falls Risk Assessment    Risk Factors:       No Risk Factors Identified Ability to Rise from Chair:       (0)  Ability to rise in a single movement    Falls Prevention Plan:       No modifications necessary   Total: (5 or greater = High Risk): 0 ©2010 [facility] . [facility] States Patent #[id number]. Federal Law prohibits the replication, distribution or use without written permission from [facility] [facility] [place]     Current Medications:    Current Outpatient Medications:     benzonatate (TESSALON) 100 mg capsule, , Disp: , Rfl: 0    pregabalin (LYRICA) 75 mg capsule, Take 75 mg by mouth two (2) times a day., Disp: , Rfl:     sertraline (ZOLOFT) 100 mg tablet, Take 150 mg by mouth nightly., Disp: , Rfl:     diazePAM (VALIUM) 10 mg tablet, 10 mg. Vaginal suppository--uses every other night, Disp: , Rfl:     omega 3-DHA-EPA-fish oil 1,000 mg (120 mg-180 mg) capsule, Take 1 Cap by mouth., Disp: , Rfl:     melatonin 5 mg cap capsule, Take 10 mg by mouth nightly., Disp: , Rfl:     estradiol (ESTRACE) 1 mg tablet, TK 1 T PO QHS, Disp: , Rfl: 11    methen-sod phos-meth blue-hyos (UROGESIC-BLUE) 81.6-40.8-0.12 mg tab, May take 1-3 tabs per day as needed for bladder pain, Disp: 60 Tab, Rfl: 2    methocarbamol (ROBAXIN) 500 mg tablet, Take 500 mg by mouth two (2) times a day., Disp: , Rfl:     UBIDECARENONE/VITAMIN E MIXED (COQ10  PO), Take  by mouth nightly., Disp: , Rfl:     VIT B2/NIACIN/B6/B12/DEXPANTH (B COMPLEX SL), by SubLINGual route daily. , Disp: , Rfl:     traMADol (ULTRAM) 50 mg tablet, Take 1-2 Tabs by mouth every six (6) hours as needed for Pain. Max Daily Amount: 400 mg., Disp: 60 Tab, Rfl: 0    clonazePAM (KLONOPIN) 0.5 mg tablet, Take  by mouth nightly as needed. , Disp: , Rfl:     promethazine (PHENERGAN) 25 mg tablet, Take 25 mg by mouth every six (6) hours as needed for Nausea., Disp: , Rfl:     ibuprofen (MOTRIN) 800 mg tablet, Take 1 Tab by mouth every six (6) hours as needed. (Patient taking differently: Take 800 mg by mouth every six (6) hours as needed. Last dose 12/17/17), Disp: 30 Tab, Rfl: 0    magnesium oxide (MAG-OX) 400 mg tablet, Take 400 mg by mouth daily. , Disp: , Rfl:     loratadine (CLARITIN) 10 mg tablet, Take 10 mg by mouth every evening., Disp: , Rfl:     LACTOBACILLUS ACIDOPHILUS (PROBIOTIC PO), Take  by mouth., Disp: , Rfl:     multivitamin (ONE A DAY) tablet, Take 1 Tab by mouth daily. , Disp: , Rfl:    Date Last Reviewed:  10/21/2019   Voiding Patterns:  Patient voids every 4 hours during the day and 1 times during the night. Patient reports that she does not empties bladder. Does not leak. Fluid Intake:  Patient drinks 1 gallon of water per day. She consumes 1 cups of caffeinated beverages per day. Patient not limit fluid intake to control bladder. Bowel Habits:  Patient demonstrates constipation. Probiotic, citricel, linzes, mirilax, smooth move. Bowel movements are soft  Personal / Social History:  · Sexually active?  Yes, but very painful       Number of Personal Factors/Comorbidities that affect the Plan of Care: 3+: HIGH COMPLEXITY   EXAMINATION:   External Observation:     · Anal Buckfield: present  · Skin Integrity: WNL  · Q-tip Test: Painful  Palpation:       8/14/2019  No palpation        Right Left   Bulbocavernosus Moderate tenderness Moderate tenderness   Ischocavernosus Min tenderness Min tenderness   Superficial Transverse Perineal Moderate tenderness Moderate tenderness   Sphincter Uhrovaginal     Compressor Urethra      Obturator Internus Pain = 6/10 Pain = 7/10   Iliococcygeus Pain = 5/10 Pain = 6/10   Coccygeus     Pubococcygeus Pain = 4/10 Pain = 5/10   Levator Ani     Adductor tenderness Tenderness    Glut medius  Tenderness at medial insersion   Lower abdomen/pelvis Moderate tenderness Significant tenderness   psoas Min tenderness Unable to palpate due to pain    iliacus Min tenderness Moderate tenderness    coccxy Moderate tenderness Significant tenderness               SEMG evaluation:   Date: 6/26/2019   Resting Tone: starting at 0.5   Quality of Resting Tone: good   5 Second Hold: 5 uV   10 Second Hold: NT uV   Quality of Recruitment: Good    Quality of Relaxation: Good    Quality of Holding: Fair    Stability of Hold: Fair    Stability of Rest: good after manual        Body Structures Involved:  1. Muscles Body Functions Affected:  1. Genitourinary  2. Neuromusculoskeletal Activities and Participation Affected:  1. Mobility  2. Self Care  3. Interpersonal Interactions and Relationships   Number of elements that affect the Plan of Care: 4+: HIGH COMPLEXITY   CLINICAL PRESENTATION:   Presentation: Evolving clinical presentation with unstable and unpredictable characteristics: HIGH COMPLEXITY   CLINICAL DECISION MAKING:   Outcome Measure: Tool Used: Pain Disability Index (PFDI-20)  Score:  Initial: 56 Most Recent: 47 (Date: 8/14/2019 )   Interpretation of Score: This survey asks questions concerning certain  bowel, bladder, or pelvic symptoms and how much this symptoms interfere with daily activiites. Each section is scored on a 0-4 scale, 4 representing the greatest disability. The scores of each section are added together for a total score out of 70. Score 0 1-13 14-27 28-41 42-55 56-69 70   Modifier CH CI CJ CK CL CM CN     Medical Necessity:   · Patient is expected to demonstrate progress in coordination to decrease assistance required with pelvic floor drops. Reason for Services/Other Comments:  · Patient continues to require skilled intervention due to pelvic pain.    Use of outcome tool(s) and clinical judgement create a POC that gives a: Difficult prediction of patient's progress: HIGH COMPLEXITY   TREATMENT:

## 2019-10-25 ENCOUNTER — HOSPITAL ENCOUNTER (OUTPATIENT)
Dept: PHYSICAL THERAPY | Age: 34
Discharge: HOME | End: 2019-10-25
Payer: COMMERCIAL

## 2019-10-25 PROCEDURE — 97140 MANUAL THERAPY 1/> REGIONS: CPT

## 2019-10-25 NOTE — PROGRESS NOTES
Careyrachell Rojas  : 1985  Primary: Elybeckie Bebeto [facility]  Secondary:  [address]  at [facility]  [address] [facility], [address] [place] [address] 83[address], [address]  [phone]   [phone]       Kaylan Chew  : 1985  Primary: Garret Bebeto [facility]  Secondary:  [address]  at [facility]  [address] [facility], [address] [facility] [address], [address]  [phone]   [phone]       OUTPATIENT PHYSICAL THERAPY: Daily Treatment Note 10/25/2019  MEDICAL/REFERRING DIAGNOSIS:  Pelvic and perineal pain [R10.2]   REFERRING PHYSICIAN: Analia Lopez MD  Pre-treatment Symptoms/Complaints: Pt states that she had intercourse and her  reported that he was able to penetrate a bit deeper. She states that she focused on relaxation. Is planning on getting a massage next week. Pain: Initial:  7 /10 vulvar pain   610 pelvic pain   Post Session:  7/10 vulva  6/10 pelvis       Medications Last Reviewed:  10/25/2019     See evaluation note from today      * indicates pain / 10     TREATMENT:   THERAPEUTIC EXERCISE: (0 minutes):  Exercises per grid below to improve strength and coordination. Required moderate verbal and tactile cues to promote proper body breathing techniques. Progressed repetitions and complexity of movement as indicated. MANUAL THERAPY: (55   minutes): Soft tissue mobilization was utilized and necessary because of the patient's painful spasm and restricted motion of soft tissue. (Used abbreviations: MET - muscle energy technique; SCS- Strain counter strain; CTM- Connective tissue mobilizations; CR- Contract/relax; SP- Sustained pressure, TrP- Trigger point release, IASTM- Instrument assisted soft tissue mobilizations, TDN- Trigger point dry needling). : MODALITIES: (0 minutes):      *  Cold Pack Therapy in order to reduce inflammation and edema. Cold pack applied to the vulva to decrease pain and inflammation.      Gentle sinking, combining planes and tensioning into to abdomen to decrease tension at connective tissue and fascia  Gentle tension loading to scar of laparoscopy at abdomen  CTM to bilateral adductors with rolling and strumming. Nerve flossing to pudendal n. SCS and Trigger point to bilateral deep transverse perineum and superficial transverse, levator ani and oI  perineum to reduce tone and improve  tissue elasticity  Bilateral lumbar distraction in prone   CTM to sacrotuberous ligament, piriformis, and multifidus in prone (not performed)  Right psoas release. (not performed)     Exercises:  Patient instructed in pelvic floor exercises listed below:    Date:  10/25/2019   Activity/Exercise     Prone extension    Multifidus     Left external rotation stretch     Adductor stetch    Cat cow    rena pose    Hip flexor stretch    rena pose hands to the right and left    PF drop in supine with biofeedback assist for visual feedback via external anal sensors      Lumbar hip distraction     Hip adduction         MedBridge Portal  The following educational topics were reviewed with patient:  None today. Treatment/Session Summary:   · Response to Treatment: Pt was able to tolerate internal soft tissue slightly better. Will continue to encourage dilators. · Equipment provided today:  None today  · Recommendations/Intent for next treatment session: Next visit will focus on increase dilator if possible.     Treatment Plan of Care Effective Dates: 10/18/2019 to 1/16/2020    Total Treatment Billable Duration:  55   minutes     PT Patient Time In/Time Out  Time In: 1200  Time Out: Mari Paulino, PT    Future Appointments   Date Time Provider Jose Cruz Maier   11/1/2019 12:00 PM Joseph Yen PT [facility]   11/8/2019 11:00 AM Joseph Yen PT [facility]   11/15/2019  1:00 PM Joseph Yen PT [facility]   11/22/2019 11:00 AM Joseph Yen PT [facility]   11/27/2019  1:00 PM Joseph Yen PT [facility] 2/13/2020 10:00 AM MD Jose Enrique Polo

## 2019-11-01 ENCOUNTER — HOSPITAL ENCOUNTER (OUTPATIENT)
Dept: PHYSICAL THERAPY | Age: 34
Discharge: HOME | End: 2019-11-01
Payer: COMMERCIAL

## 2019-11-01 PROCEDURE — 97014 ELECTRIC STIMULATION THERAPY: CPT

## 2019-11-01 PROCEDURE — 97140 MANUAL THERAPY 1/> REGIONS: CPT

## 2019-11-01 NOTE — PROGRESS NOTES
Ta Rojas  : 1985  Primary: Mary Mckenzie  Secondary:  [address]  at [facility]  [address] [facility], [address] [place] [address] [address], [address]  [phone]   [phone]       Lynnann Runner  : 1985  Primary: Mary Mensah [facility]  Secondary:  [address]  at [facility]  [address] [facility], [address] [facility] [address], [address]  [phone]   [phone]       OUTPATIENT PHYSICAL THERAPY: Daily Treatment Note 2019  MEDICAL/REFERRING DIAGNOSIS:  Pelvic and perineal pain [R10.2]   REFERRING PHYSICIAN: Carito Kern MD  Pre-treatment Symptoms/Complaints: Pt states that she had a flare this week. Has more pain today. Pain: Initial:  7 /10 oelvis   Post Session:  6/10 pelvis        Medications Last Reviewed:  2019    Increased pain at left levator ani to signifcant  * indicates pain / 10     TREATMENT:   THERAPEUTIC EXERCISE: (0 minutes):  Exercises per grid below to improve strength and coordination. Required moderate verbal and tactile cues to promote proper body breathing techniques. Progressed repetitions and complexity of movement as indicated. MANUAL THERAPY: (55   minutes): Soft tissue mobilization was utilized and necessary because of the patient's painful spasm and restricted motion of soft tissue. (Used abbreviations: MET - muscle energy technique; SCS- Strain counter strain; CTM- Connective tissue mobilizations; CR- Contract/relax; SP- Sustained pressure, TrP- Trigger point release, IASTM- Instrument assisted soft tissue mobilizations, TDN- Trigger point dry needling).   :MODALITIES: (15 minutes): *  Electrical Stimulation Therapy (15 ) was provided with intensity adjusted throughout treatment to patient tolerance. with hot pack to abdomen/pelvis to decrease pain and mm tone.         Gentle sinking, combining planes and tensioning into to abdomen to decrease tension at connective tissue and fascia (not performed)  Gentle tension loading to scar of laparoscopy at abdomen (not performed)  CTM to bilateral adductors with rolling and strumming. Nerve flossing to pudendal n. SCS and Trigger point to bilateral deep transverse perineum and superficial transverse, levator ani and oI  perineum to reduce tone and improve  tissue elasticity  Bilateral lumbar distraction in prone   CTM to sacrotuberous ligament, piriformis, and multifidus in prone (not performed)  Right psoas release. (not performed)     Exercises:  Patient instructed in pelvic floor exercises listed below:    Date:  11/1/2019   Activity/Exercise     Prone extension    Multifidus     Left external rotation stretch     Adductor stetch    Cat cow    rena pose    Hip flexor stretch    rnea pose hands to the right and left    PF drop in supine with biofeedback assist for visual feedback via external anal sensors      Lumbar hip distraction     Hip adduction         [employer] Portal  The following educational topics were reviewed with patient:  None today. Treatment/Session Summary:   · Response to Treatment: Pt did have increased tone at levator ani B left greater than right. Continues to make slow progress. · Equipment provided today:  None today  · Recommendations/Intent for next treatment session: Next visit will focus on increase dilator if possible.     Treatment Plan of Care Effective Dates: 10/18/2019 to 1/16/2020    Total Treatment Billable Duration:  55   minutes     PT Patient Time In/Time Out  Time In: 1200  Time Out: [address], PT    Future Appointments   Date Time Provider Jose Cruz Maier   11/8/2019 11:00 AM Ned Bullock, PT [facility]   11/15/2019  1:00 PM Ned Bullock PT [facility]   11/22/2019 11:00 AM Ned Bullock PT [facility]   11/27/2019  1:00 PM Ned Bullock, PT [facility]   2/13/2020 10:00 AM MD Josep Escobar

## 2019-11-08 ENCOUNTER — HOSPITAL ENCOUNTER (OUTPATIENT)
Dept: PHYSICAL THERAPY | Age: 34
Discharge: HOME | End: 2019-11-08
Payer: COMMERCIAL

## 2019-11-08 PROCEDURE — 97140 MANUAL THERAPY 1/> REGIONS: CPT

## 2019-11-08 NOTE — PROGRESS NOTES
Chary Rojas  : 1985  Primary: [facility]  Secondary:  [address]  at [address], [address] [place] [address] [address], [facility] [address]  [phone]   [phone]       Kana Thomas  : 1985  Primary: [facility]  Secondary:  [address]  at [address], [address] [place] [address] [address], [address]  [phone]   [phone]       OUTPATIENT PHYSICAL THERAPY: Daily Treatment Note 2019  MEDICAL/REFERRING DIAGNOSIS:  Pelvic and perineal pain [R10.2]   REFERRING PHYSICIAN: Hans Resenidz MD  Pre-treatment Symptoms/Complaints: Pt states that she was very busy yesterday and very tired today. Was able to participate in intercourse. Pain: Initial:  7 /10 oelvis   Post Session:  6/10 pelvis        Medications Last Reviewed:  2019    Increased pain at left levator ani to signifcant  * indicates pain / 10     TREATMENT:   THERAPEUTIC EXERCISE: (0 minutes):  Exercises per grid below to improve strength and coordination. Required moderate verbal and tactile cues to promote proper body breathing techniques. Progressed repetitions and complexity of movement as indicated. MANUAL THERAPY: (53   minutes): Soft tissue mobilization was utilized and necessary because of the patient's painful spasm and restricted motion of soft tissue. (Used abbreviations: MET - muscle energy technique; SCS- Strain counter strain; CTM- Connective tissue mobilizations; CR- Contract/relax; SP- Sustained pressure, TrP- Trigger point release, IASTM- Instrument assisted soft tissue mobilizations, TDN- Trigger point dry needling).   :MODALITIES: (0 minutes): *  Electrical Stimulation Therapy (15 ) was provided with intensity adjusted throughout treatment to patient tolerance. with hot pack to abdomen/pelvis to decrease pain and mm tone.       Gentle sinking, combining planes and tensioning into to abdomen to decrease tension at connective tissue and fascia   Gentle tension loading to scar of laparoscopy at abdomen (not performed)  CTM to bilateral adductors with rolling and strumming. Nerve flossing to pudendal n. SCS and Trigger point to bilateral deep transverse perineum and superficial transverse, levator ani and oI  perineum to reduce tone and improve  tissue elasticity  Bilateral lumbar distraction in prone   CTM to sacrotuberous ligament, piriformis, and multifidus in prone (not performed)  Right psoas release. (not performed)     Exercises:  Patient instructed in pelvic floor exercises listed below:    Date:  11/8/2019   Activity/Exercise     Prone extension    Multifidus     Left external rotation stretch     Adductor stetch    Cat cow    rena pose    Hip flexor stretch    rean pose hands to the right and left    PF drop in supine with biofeedback assist for visual feedback via external anal sensors      Lumbar hip distraction     Hip adduction         [employer] Portal  The following educational topics were reviewed with patient:  None today. Treatment/Session Summary:   · Response to Treatment: Pt did well today with CTM. Will again urge pt to bring dilator next session. · Equipment provided today:  None today  · Recommendations/Intent for next treatment session: Next visit will focus on increase dilator if possible.     Treatment Plan of Care Effective Dates: 10/18/2019 to 1/16/2020    Total Treatment Billable Duration:  55   minutes     PT Patient Time In/Time Out  Time In: 1110  Time Out: 200  Oretha Seip, PT    Future Appointments   Date Time Provider Jose Cruz Maier   11/15/2019  1:00 PM Cherri Ford PT [facility]   11/22/2019 11:00 AM MALIA Velazquez   11/27/2019  1:00 PM MALIA Velazquez OLEG   2/13/2020 10:00 AM MD Karime Turcios Seek

## 2019-11-15 ENCOUNTER — HOSPITAL ENCOUNTER (OUTPATIENT)
Dept: PHYSICAL THERAPY | Age: 34
Discharge: HOME | End: 2019-11-15
Payer: COMMERCIAL

## 2019-11-15 PROCEDURE — 97110 THERAPEUTIC EXERCISES: CPT

## 2019-11-15 PROCEDURE — 97140 MANUAL THERAPY 1/> REGIONS: CPT

## 2019-11-15 NOTE — PROGRESS NOTES
Ta Rojas  : 1985  Primary: Mary Mckenzie  Secondary:  [address]  at [facility]  [address] [facility], [address] [place] [address] [address], [address]  [phone]   [phone]       Lynnann Runner  : 1985  Primary: Mary Mensah [facility]  Secondary:  [address]  at [facility]  [address] [facility], [address] [place] [address] [address], [address]  [phone]   [phone]       OUTPATIENT PHYSICAL THERAPY: Daily Treatment Note 11/15/2019  MEDICAL/REFERRING DIAGNOSIS:  Pelvic and perineal pain [R10.2]   REFERRING PHYSICIAN: Carito Kern MD  Pre-treatment Symptoms/Complaints: Pt states that she has been doing ok this week. Pain: Initial:  7 /10 oelvis   Post Session:  6/10 pelvis        Medications Last Reviewed:  11/15/2019    Increased pain at left levator ani to signifcant  * indicates pain / 10     TREATMENT:   THERAPEUTIC EXERCISE: (8 minutes):  Exercises per grid below to improve strength and coordination. Required moderate verbal and tactile cues to promote proper body breathing techniques. Progressed repetitions and complexity of movement as indicated. MANUAL THERAPY: (45  minutes): Soft tissue mobilization was utilized and necessary because of the patient's painful spasm and restricted motion of soft tissue. (Used abbreviations: MET - muscle energy technique; SCS- Strain counter strain; CTM- Connective tissue mobilizations; CR- Contract/relax; SP- Sustained pressure, TrP- Trigger point release, IASTM- Instrument assisted soft tissue mobilizations, TDN- Trigger point dry needling).   :MODALITIES: (0 minutes): *  Electrical Stimulation Therapy (15 ) was provided with intensity adjusted throughout treatment to patient tolerance. with hot pack to abdomen/pelvis to decrease pain and mm tone.       Gentle sinking, combining planes and tensioning into to abdomen to decrease tension at connective tissue and fascia   Gentle tension loading to scar of laparoscopy at abdomen (not performed)  CTM to bilateral adductors with rolling and strumming. Nerve flossing to pudendal n. SCS and Trigger point to bilateral deep transverse perineum and superficial transverse, levator ani and oI  perineum to reduce tone and improve  tissue elasticity  Bilateral lumbar distraction in prone   CTM to sacrotuberous ligament, piriformis, and multifidus in prone (not performed)  Right psoas release. (not performed)  Cervical manipulation c1-2, and c 4-5 with consent in supine   Thoracic maniupation in prone A-P in prone.     Exercises:  Patient instructed in pelvic floor exercises listed below:    Date:  11/15/2019   Activity/Exercise     Prone extension    Multifidus     Left external rotation stretch     Adductor stetch    Cat cow    rena pose    Hip flexor stretch    rena pose hands to the right and left    PF drop in supine with biofeedback assist for visual feedback via external anal sensors      Lumbar hip distraction     Hip adduction     Dilator 2, 8min       [employer] Portal  The following educational topics were reviewed with patient:  None today. Treatment/Session Summary:   · Response to Treatment: pt did well with dilator today. Minimal pain. Making steady progress. · Equipment provided today:  None today  · Recommendations/Intent for next treatment session: Next visit will focus on increase dilator if possible.     Treatment Plan of Care Effective Dates: 10/18/2019 to 1/16/2020    Total Treatment Billable Duration:  53   minutes     PT Patient Time In/Time Out  Time In: 1300  Time Out: [facility], PT    Future Appointments   Date Time Provider Jose Cruz Maier   11/22/2019 11:00 AM Ember Adler PT [facility]   11/27/2019  1:00 PM Ember Adler PT [facility] SFE   2/13/2020 10:00 AM MD Malia Sanchez

## 2019-11-22 ENCOUNTER — HOSPITAL ENCOUNTER (OUTPATIENT)
Dept: PHYSICAL THERAPY | Age: 34
Discharge: HOME | End: 2019-11-22
Payer: COMMERCIAL

## 2019-11-22 PROCEDURE — 97140 MANUAL THERAPY 1/> REGIONS: CPT

## 2019-11-22 PROCEDURE — 97110 THERAPEUTIC EXERCISES: CPT

## 2019-11-22 NOTE — PROGRESS NOTES
Carey Rojas  : 1985  Primary: Elybeckie Bebeto [facility]  Secondary:  [address]  at [facility]  [address] [facility], [address] [place] [address] [address], [address]  [phone]   [phone]       Kaylan Chew  : 1985  Primary: Garret Marreroob [facility]  Secondary:  [address]  at [facility]  [address] [facility], [address] [facility] [address], [address]  [phone]   [phone]       OUTPATIENT PHYSICAL THERAPY: Daily Treatment Note 2019  MEDICAL/REFERRING DIAGNOSIS:  Pelvic and perineal pain [R10.2]   REFERRING PHYSICIAN: Analia Lopez MD  Pre-treatment Symptoms/Complaints: Pt states that she has been doing ok this week. Pain: Initial:  7 /10 oelvis   Post Session:  6/10 pelvis        Medications Last Reviewed:  2019    Increased pain at left levator ani to signifcant  * indicates pain / 10     TREATMENT:   THERAPEUTIC EXERCISE: (8 minutes):  Exercises per grid below to improve strength and coordination. Required moderate verbal and tactile cues to promote proper body breathing techniques. Progressed repetitions and complexity of movement as indicated. MANUAL THERAPY: (45  minutes): Soft tissue mobilization was utilized and necessary because of the patient's painful spasm and restricted motion of soft tissue. (Used abbreviations: MET - muscle energy technique; SCS- Strain counter strain; CTM- Connective tissue mobilizations; CR- Contract/relax; SP- Sustained pressure, TrP- Trigger point release, IASTM- Instrument assisted soft tissue mobilizations, TDN- Trigger point dry needling).   :MODALITIES: (0 minutes): *  Electrical Stimulation Therapy (15 ) was provided with intensity adjusted throughout treatment to patient tolerance. with hot pack to abdomen/pelvis to decrease pain and mm tone.       Gentle sinking, combining planes and tensioning into to abdomen to decrease tension at connective tissue and fascia   Gentle tension loading to scar of laparoscopy at abdomen (not performed)  CTM to bilateral adductors with rolling and strumming. Nerve flossing to pudendal n. SCS and Trigger point to bilateral deep transverse perineum and superficial transverse, levator ani and oI  perineum to reduce tone and improve  tissue elasticity  Bilateral lumbar distraction in prone   CTM to sacrotuberous ligament, piriformis, and multifidus in prone (not performed)  Right psoas release. (not performed)  Cervical manipulation c1-2, and c 4-5 with consent in supine (not performed)  Thoracic maniupation in prone A-P in prone. (not performed)     Exercises:  Patient instructed in pelvic floor exercises listed below:    Date:  11/22/2019   Activity/Exercise     Prone extension    Multifidus     Left external rotation stretch     Adductor stetch    Cat cow    rena pose    Hip flexor stretch    rena pose hands to the right and left    PF drop in supine with biofeedback assist for visual feedback via external anal sensors      Lumbar hip distraction     Hip adduction     Dilator 2, 8min       [employer] Portal  The following educational topics were reviewed with patient:  None today. Treatment/Session Summary:   · Response to Treatment: pt did well with dilator today. Has some increased burning at the opening today initially but did resolve. · Equipment provided today:  None today  · Recommendations/Intent for next treatment session: Next visit will focus on increase dilator if possible.     Treatment Plan of Care Effective Dates: 10/18/2019 to 1/16/2020    Total Treatment Billable Duration:  53   minutes     PT Patient Time In/Time Out  Time In: 1100  Time Out: 100 Darling Wilson PT    Future Appointments   Date Time Provider Jose Cruz Maier   11/27/2019  1:00 PM Guillermo Morales PT [facility]   2/13/2020 10:00 AM MD Robina Noble

## 2019-11-27 ENCOUNTER — HOSPITAL ENCOUNTER (OUTPATIENT)
Dept: PHYSICAL THERAPY | Age: 34
Discharge: HOME | End: 2019-11-27
Payer: COMMERCIAL

## 2019-11-27 PROCEDURE — 97110 THERAPEUTIC EXERCISES: CPT

## 2019-11-27 PROCEDURE — 97140 MANUAL THERAPY 1/> REGIONS: CPT

## 2019-11-27 NOTE — PROGRESS NOTES
Ronn Rojas  : 1985  Primary: [facility]  Secondary:  [address]  at [place] [address] [facility], [address] [place] [address] [address], [address]  [phone]   [phone]       Felicita Broderick  : 1985  Primary: [facility]  Secondary:  [address]  at [facility]  [address] [facility], [address] [place] [address] [address], [address]  [phone]   [phone]       OUTPATIENT PHYSICAL THERAPY: Daily Treatment Note 2019  MEDICAL/REFERRING DIAGNOSIS:  Pelvic and perineal pain [R10.2]   REFERRING PHYSICIAN: Dillon Cushing, MD  Pre-treatment Symptoms/Complaints: Pt states she had a massage yesterday and feels like she is more sore. But she feels looser. Does have some discomfort at the vagina. Pain: Initial:  7 /10 oelvis   Post Session:  6/10 pelvis        Medications Last Reviewed:  2019    Increased pain at left levator ani to signifcant  * indicates pain / 10     TREATMENT:   THERAPEUTIC EXERCISE: (8 minutes):  Exercises per grid below to improve strength and coordination. Required moderate verbal and tactile cues to promote proper body breathing techniques. Progressed repetitions and complexity of movement as indicated. MANUAL THERAPY: (45  minutes): Soft tissue mobilization was utilized and necessary because of the patient's painful spasm and restricted motion of soft tissue. (Used abbreviations: MET - muscle energy technique; SCS- Strain counter strain; CTM- Connective tissue mobilizations; CR- Contract/relax; SP- Sustained pressure, TrP- Trigger point release, IASTM- Instrument assisted soft tissue mobilizations, TDN- Trigger point dry needling).   :MODALITIES: (0 minutes): *  Electrical Stimulation Therapy (15 ) was provided with intensity adjusted throughout treatment to patient tolerance. with hot pack to abdomen/pelvis to decrease pain and mm tone.       Gentle sinking, combining planes and tensioning into to abdomen to decrease tension at connective tissue and fascia   Gentle tension loading to scar of laparoscopy at abdomen (not performed)  CTM to bilateral adductors with rolling and strumming. Nerve flossing to pudendal n. SCS and Trigger point to bilateral deep transverse perineum and superficial transverse, levator ani and oI  perineum to reduce tone and improve  tissue elasticity  Bilateral lumbar distraction in prone   CTM to sacrotuberous ligament, piriformis, and multifidus in prone (not performed)  Right psoas release. (not performed)  Cervical manipulation c1-2, and c 4-5 with consent in supine (not performed)  Thoracic maniupation in prone A-P in prone. (not performed)     Exercises:  Patient instructed in pelvic floor exercises listed below:    Date:  11/27/2019   Activity/Exercise     Prone extension    Multifidus     Left external rotation stretch     Adductor stetch    Cat cow    rena pose    Hip flexor stretch    rena pose hands to the right and left    PF drop in supine with biofeedback assist for visual feedback via external anal sensors      Lumbar hip distraction     Hip adduction     Dilator 2, 8min       [employer] Portal  The following educational topics were reviewed with patient:  None today. Treatment/Session Summary:   · Response to Treatment: Pt did have increased discomfort on the right side of the pelvic floor mm. Her increased discomfort appeared to be at the compressor urehtra and ischocavernosus after treatment today. · Equipment provided today:  None today  · Recommendations/Intent for next treatment session: Next visit will focus on increase dilator if possible.     Treatment Plan of Care Effective Dates: 10/18/2019 to 1/16/2020    Total Treatment Billable Duration:  53   minutes     PT Patient Time In/Time Out  Time In: 1300  Time Out: [facility], PT    Future Appointments   Date Time Provider Jose Cruz Maier   12/5/2019  9:00 AM Gerard Gil, PT [facility] 12/13/2019 11:00 AM Mukesh Chavarria, PT [facility] [place]   12/19/2019  1:00 PM Mukesh Chavarria, PT [facility]PT S[facility]   12/31/2019 11:00 AM Mukesh Chavarria, PT S[facility]ORPT SFE   2/13/2020 10:00 AM MD Carine Funez

## 2019-11-27 NOTE — THERAPY RECERTIFICATION
Simon Rojas  : 1985  Primary: Kenya Haq [facility]  Secondary:  [address] at [place] [id number] [facility], [address] [place] [address],[address], [facility] [address].  [phone]   [phone]          OUTPATIENT PHYSICAL THERAPY:Re-evaluation 10/18/2019    ICD-10: Treatment Diagnosis: Myalgia (M79.1)Fibromyalgia (M79.7)Other lack of coordination (R27.8)   Precautions/Allergies:   Codeine; Dermagraft [cult skin subst, human-bovine]; and Percocet [oxycodone-acetaminophen]   MD Orders: Eval and treat MEDICAL/REFERRING DIAGNOSIS:  Pelvic and perineal pain [R10.2]   DATE OF ONSET: Chronic  REFERRING PHYSICIAN: Aliza Patricia MD  RETURN PHYSICIAN APPOINTMENT: TBD      RE-Evaluation :  2019  Ms. Ana Maria Shelton has been seen in PT since 2018. She has started to make some improvements since Botox x 2. She continues to have pain with palpation at the lower quadrant left greater then right as well as throughout the pelvic floor. However, she is able to tolerate slightly increased pressure. Her progress is slow but has started to become steady. We have started to do some dilators and she is also has seen a massage therapist. Her complaints with insertion has improved. Today, after therapy she had some increased pelvic pain as well as vulva pain. She plans on getting Botox on Monday with Dr. Ernesto Beauchamp. I recommend therapy soon after her treatment. If tolerable I feel that she would benefit from PT x 2 per week. Pt would benefit from skilled therapy to address these deficits for return to previous level of function. RE-Evaluation:  10/18/2019  Ms. Ana Maria Shelton has been seen in PT since 2018. She has now been post Botox x2. She her progress seems to minimal even after the Botox. Functionally, she continues to be unable to exercise without increased discomfort. She continues to have coccyx and low back. She is unable to participate in intercourse.   With gentle palpation throughout the pelvic floor mm, she continues to report significant amount of pain. I feel at this point, therapy has not been significantly helpful. She does respond well the soft tissue at the adductors. She might benefit from a massage therapist.  According to Darion Mendoza, she reports that Dr. Ernesto Beauchamp would like her to attend therapy due to the recent Botox injections. I feel that normally therapy would be important as well as dilator use. She has been non complaint with her dilators. I am awaiting a follow up with Dr. Ernesto Beauchamp, but at this point, she has not made an appointment. At, this point,  I will see Darion Mendoza for 1 x per week one more month to see if any changes occur from the Botox. At that point, I will re-assess for possible D/C. RE-Evaluation:  8/14/2019  Ms. Ana Maria Shelton has been seen in physical therapy since 11/30/2018. Anselmo Young She recently had botox injections with Dr. Ernesto Beauchamp back in March. I feel with palpation the tissue feel pliable with minimal tone, but Ms. Rojas continue to have significant pan with even gentle palpation throughout the pelvic floor mm. Her coccyx pain remains unchanged. She did have improvements on her self report Pain Disability Index. She will return to Dr. Ernesto Beauchamp for increased botox treatments soon. I am hopeful that this round of injections will produce a greater result. Pt continues to have poor rehab potential. She is slowly working towards her PT goals. Continue PT 1x / week to address pelvis and vulvar impairments. Re-Evaluation  5/10/2019  Ms. Ana Maria Shelton has been to physical therapy since 11/30/2018for 20 visits. She continues to progress slowly but steadily. She is now able to tolerate palpation into the pelvic floor for up to 8 min with 1 digit. We are now able to palpate bilateral levator ani and obturator internus. She is able to participate in penetrative intercourse, but continues to have pain.  She has been instructed to attempt dilator insertion to and add friction if able to do so. She recently was advised to consider a x-ray of her coccyx which reveal a posterior inferior sacral fracture and some type of artifact in the left abdominal region. She will be sending her images to her Dr. Ramos Melgoza in [place]. She also saw a [facility]RO[facility]GYN in [facility] and will be having Botox, n. Block, and Due to her deficits she continues to be a good candidate for PT. Continue PT 1 x week. Re-Evaluation  2/8/2019  Ms. Rojas has been to therapy for 10 visits one time per week. She has made minimal gains with PT. She presents with good resting tone with biofeedback. She is only able to tolerate insertion of 1 digit into the pelvic floor for 5 min. After her visit, she reports discomfort for 3-4 hours. She has been able to tolerate active gentle exercises such as the Nu-step. She continues to have increased pain with palpation throughout the superficial and deep layers of the pelvic floor left greater than right. She did have penetrative intercourse with her  a few weeks ago resulting in pain for 2 days. She has many barriers for rehab which include numerous surgeries for endometriosis and previous attempts with PT. I expect slow and steady progress. She is motivated with PT. Due to her deficits she continues to be a good candidate for PT. INITIAL ASSESSMENT:  Ms. Areli Roberts returns to therapy after having surgery with Dr. Ramos Melgoza and botox with Dr. Baron Watts. She is now 3 weeks post op her endometrial surgery. She had previous Botox prior to surgery in the pelvic floor mm. . Her surgical and therapy history is very long and complex. She has had multiple surgery's and multiple encoutners with pelvic floor rehabilitation. Today in the clinic, I was only able to palpate at the introitus and the first layer of pelvic floor mm. Pt reported significant pain with palpation at the vaginal opening, labia majora, and abdomen.  Therefore, I suggested we wait two weeks until her next visit.  She was agreeable with POC. Pt is an excellent candidate for pelvic floor therapy due to her limitations with sitting, standing, penetrative intercourse, PMH, and significant level of pain. Pt would benefit from skilled PT. PROBLEM LIST (Impacting functional limitations):  1. Decreased Strength  2. Decreased ADL/Functional Activities  3. Increased Pain  4. Decreased Flexibility/Joint Mobility  5. Decreased Sargent with Home Exercise Program INTERVENTIONS PLANNED:  1. Neuromuscular re-education  2. Biofeedback as needed  3. HEP  4. Bladder retraining  5. Bladder education  6. Electrical Stimulation  7. Home Exercise Program (HEP)  8. Manual Therapy  9. Range of Motion (ROM)  10. Therapeutic Exercise/Strengthening  11. Ultrasound (US)  12. low level light therapy   TREATMENT PLAN:  Effective Dates:  10/18/2019 to 1/16/2020 (90 days). Frequency/Duration: 1 times a week for 90 Days  GOALS: (Goals have been discussed and agreed upon with patient.)  Short-Term Functional Goals: Time Frame: in 4 weeks  1. Patient will demonstrate independence with home exercise program.(met)  Discharge Goals: Time Frame:in 12 weeks  1. Pt able to tolerate size 2 Amielle dilator for 8 min in order to tolerate vaginal penetration. (working towards)  2. Pt report pain with palpation on the left side of the pelivc floor less than 2/10. (not met)  3. Pt will tolerate 30 min of aerobic activity without increased pelvic pain. (working towards)  Rehabilitation Potential For Stated Goals: Poor  Regarding Dayana SMITH Bob's therapy, I certify that the treatment plan above will be carried out by a therapist or under their direction. Thank you for this referral,  Guille Lopez PT     Referring Physician Signature: Dillon Cushing, MD              Date                    The information in this section was collected on 11/27/2019  (except where otherwise noted).    HISTORY:   History of Present Injury/Illness (Reason for Referral):  Dx at 13 with endometriosis. That was when she had her first laprascropy. Has had 4 laparoscopy. First vaginal delivery 2009. Second child was born 2010. Had a vaginal delivery. Did do an ablation  which lasted for about one year. 2014 had a hysterectomy. Got sick after surgery. 8 weeks later had a vaginal dehiscence. Was septic then. Did not rebuild the cuff. The endo starts to attach the vaginal cuff. Started seeing Roylene List for pelvic floor therapy. Lea Boxer is having difficulty with palpation. Nov 2016 had a excision surgery for endometriosis. They build up the cuff. They tacked the left ovary. Eventually took out the left ovary in 2017 and found endo in the pelvis. In 2017, was dx with fibro. Gillian Montenegro tried to ablation of the ilioinguinal nerve. Had necrosis around the hip bone. Recently had Botox with . Dr Teagan Schmitt had a cytoscopy and the bladder was healthy. Went to see Dr. Amari Antunez to  redo surgery in the pelvis for possible endo. Deadend nerve and it feels better. Vaginal valium in the past was helpful. Needling does help pain, soft tissue, and deep tissues. Unable to stand for any period of time. Does better with movement. Had tailbone pain for a while and does use cushions. Feels burring at the [facility]. 8/14/2019  Pt states that she feels like the botox is wearing off as she feels that the labia feel more heavy and painful. Did have intercourse with her  and she feels very sore in the vulvar area. Past Medical History/Comorbidities:   Ms. Donavan Isbell  has a past medical history of Anxiety (9/6/2013), Bladder pain, Chronic pain, Endometriosis, Gluten intolerance, High-tone pelvic floor dysfunction, IBS (irritable bowel syndrome), Migraine, Nausea & vomiting, Ovarian cyst, PUD (peptic ulcer disease) (12/2013 ), Unspecified adverse effect of anesthesia, UTI (urinary tract infection), and Vaginal cuff dehiscence.   Ms. Donavan Isbell  has a past surgical history that includes pr endometrial cryoablation; hx other surgical; hx bilateral salpingectomy; hx other surgical (11/2016); hx total laparoscopic hysterectomy; hx breast augmentation (2003); hx gyn (2000, 2004); hx dilation and curettage; hx pelvic laparoscopy (11/03/2016); hx oophorectomy (Left); hx other surgical (10/30/2018); and hx other surgical (11/06/2018). Social History/Living Environment:    Has 3 children, works during the day at [employer]  Prior Level of Function/Work/Activity:  Has been having pain for many years. Previous Treatment Approaches:          Pelvic floor therapy. Ambulatory/Rehab Services H2 Model Falls Risk Assessment    Risk Factors:       No Risk Factors Identified Ability to Rise from Chair:       (0)  Ability to rise in a single movement    Falls Prevention Plan:       No modifications necessary   Total: (5 or greater = High Risk): 0    ©2010 [facility] of [employer]. All Rights Reserved. [facility] Patent #[id number]. Federal Law prohibits the replication, distribution or use without written permission from [facility] [employer]     Current Medications:    Current Outpatient Medications:     benzonatate (TESSALON) 100 mg capsule, , Disp: , Rfl: 0    pregabalin (LYRICA) 75 mg capsule, Take 75 mg by mouth two (2) times a day., Disp: , Rfl:     sertraline (ZOLOFT) 100 mg tablet, Take 150 mg by mouth nightly., Disp: , Rfl:     diazePAM (VALIUM) 10 mg tablet, 10 mg. Vaginal suppository--uses every other night, Disp: , Rfl:     omega 3-DHA-EPA-fish oil 1,000 mg (120 mg-180 mg) capsule, Take 1 Cap by mouth., Disp: , Rfl:     melatonin 5 mg cap capsule, Take 10 mg by mouth nightly., Disp: , Rfl:     estradiol (ESTRACE) 1 mg tablet, TK 1 T PO QHS, Disp: , Rfl: 11    methen-sod phos-meth blue-hyos (UROGESIC-BLUE) 81.6-40.8-0.12 mg tab, May take 1-3 tabs per day as needed for bladder pain, Disp: 60 Tab, Rfl: 2    methocarbamol (ROBAXIN) 500 mg tablet, Take 500 mg by mouth two (2) times a day. , Disp: , Rfl:     UBIDECARENONE/VITAMIN E MIXED (COQ10  PO), Take  by mouth nightly., Disp: , Rfl:     VIT B2/NIACIN/B6/B12/DEXPANTH (B COMPLEX SL), by SubLINGual route daily. , Disp: , Rfl:     traMADol (ULTRAM) 50 mg tablet, Take 1-2 Tabs by mouth every six (6) hours as needed for Pain. Max Daily Amount: 400 mg., Disp: 60 Tab, Rfl: 0    clonazePAM (KLONOPIN) 0.5 mg tablet, Take  by mouth nightly as needed. , Disp: , Rfl:     promethazine (PHENERGAN) 25 mg tablet, Take 25 mg by mouth every six (6) hours as needed for Nausea., Disp: , Rfl:     ibuprofen (MOTRIN) 800 mg tablet, Take 1 Tab by mouth every six (6) hours as needed. (Patient taking differently: Take 800 mg by mouth every six (6) hours as needed. Last dose 12/17/17), Disp: 30 Tab, Rfl: 0    magnesium oxide (MAG-OX) 400 mg tablet, Take 400 mg by mouth daily. , Disp: , Rfl:     loratadine (CLARITIN) 10 mg tablet, Take 10 mg by mouth every evening., Disp: , Rfl:     LACTOBACILLUS ACIDOPHILUS (PROBIOTIC PO), Take  by mouth., Disp: , Rfl:     multivitamin (ONE A DAY) tablet, Take 1 Tab by mouth daily. , Disp: , Rfl:    Date Last Reviewed:  11/27/2019   Voiding Patterns:  Patient voids every 4 hours during the day and 1 times during the night. Patient reports that she does not empties bladder. Does not leak. Fluid Intake:  Patient drinks 1 gallon of water per day. She consumes 1 cups of caffeinated beverages per day. Patient not limit fluid intake to control bladder. Bowel Habits:  Patient demonstrates constipation. Probiotic, citricel, linzes, mirilax, smooth move. Bowel movements are soft  Personal / Social History:  · Sexually active?  Yes, but very painful       Number of Personal Factors/Comorbidities that affect the Plan of Care: 3+: HIGH COMPLEXITY   EXAMINATION:   External Observation:     · Anal Julian: present  · Skin Integrity: WNL  · Q-tip Test: Painful  Palpation:       8/14/2019  No palpation        Right Left   Bulbocavernosus Moderate tenderness Moderate tenderness   Ischocavernosus Min tenderness Min tenderness   Superficial Transverse Perineal Moderate tenderness Moderate tenderness   Sphincter Uhrovaginal     Compressor Urethra      Obturator Internus Pain = 6/10 Pain = 7/10   Iliococcygeus Pain = 5/10 Pain = 6/10   Coccygeus     Pubococcygeus Pain = 4/10 Pain = 5/10   Levator Ani     Adductor tenderness Tenderness    Glut medius  Tenderness at medial insersion   Lower abdomen/pelvis Moderate tenderness Significant tenderness   psoas Min tenderness Unable to palpate due to pain    iliacus Min tenderness Moderate tenderness    coccxy Moderate tenderness Significant tenderness             11/27/2019         Right Left   Bulbocavernosus Miderate tenderness Min tenderness   Ischocavernosus Mod tenderness Min tenderness   Superficial Transverse Perineal Min  tenderness Moderate tenderness   Sphincter Uhrovaginal     Compressor Urethra      Obturator Internus Pain = 5/10 Pain = 6/10   Iliococcygeus Pain = 5/10 Pain = 6/10   Coccygeus     Pubococcygeus Pain = 4/10 Pain = 4/10   Levator Ani     Adductor tenderness Tenderness    Glut medius  Tenderness at medial insersion   Lower abdomen/pelvis Moderate tenderness Significant tenderness   psoas Min tenderness Unable to palpate due to pain    iliacus Min tenderness Moderate tenderness    coccxy NT NT            Dilator:  Pain at opening 6/10 pain at cuff 5/10     SEMG evaluation:   Date: 6/26/2019   Resting Tone: starting at 0.5   Quality of Resting Tone: good   5 Second Hold: 5 uV   10 Second Hold: NT uV   Quality of Recruitment: Good    Quality of Relaxation: Good    Quality of Holding: Fair    Stability of Hold: Fair    Stability of Rest: good after manual        Body Structures Involved:  1. Muscles Body Functions Affected:  1. Genitourinary  2. Neuromusculoskeletal Activities and Participation Affected:  1. Mobility  2. Self Care  3.  Interpersonal Interactions and Relationships   Number of elements that affect the Plan of Care: 4+: HIGH COMPLEXITY   CLINICAL PRESENTATION:   Presentation: Evolving clinical presentation with unstable and unpredictable characteristics: HIGH COMPLEXITY   CLINICAL DECISION MAKING:   Outcome Measure: Tool Used: Pain Disability Index (PFDI-20)  Score:  Initial: 56 Most Recent: 47 (Date: 8/14/2019 )   Interpretation of Score: This survey asks questions concerning certain  bowel, bladder, or pelvic symptoms and how much this symptoms interfere with daily activiites. Each section is scored on a 0-4 scale, 4 representing the greatest disability. The scores of each section are added together for a total score out of 70. Score 0 1-13 14-27 28-41 42-55 56-69 70   Modifier CH CI CJ CK CL CM CN     Medical Necessity:   · Patient is expected to demonstrate progress in coordination to decrease assistance required with pelvic floor drops. Reason for Services/Other Comments:  · Patient continues to require skilled intervention due to pelvic pain.    Use of outcome tool(s) and clinical judgement create a POC that gives a: Difficult prediction of patient's progress: HIGH COMPLEXITY   TREATMENT:

## 2019-12-05 ENCOUNTER — HOSPITAL ENCOUNTER (OUTPATIENT)
Dept: PHYSICAL THERAPY | Age: 34
Discharge: HOME | End: 2019-12-05
Payer: COMMERCIAL

## 2019-12-05 PROCEDURE — 97140 MANUAL THERAPY 1/> REGIONS: CPT

## 2019-12-05 NOTE — PROGRESS NOTES
Ta Rojas  : 1985  Primary: Mendonlali Mckenzie  Secondary:  [address]  at [facility]  [address] [facility], [address] [place] [address] [address], [facility] [address].  [phone]   [phone]       Lynnann Runner  : 1985  Primary: Mary Mensah [facility]  Secondary:  [address]  at [facility]  [address] [facility], [address] [place] [address] [address], [place] [address]  [phone]   [phone]       OUTPATIENT PHYSICAL THERAPY: Daily Treatment Note 2019  MEDICAL/REFERRING DIAGNOSIS:  Pelvic and perineal pain [R10.2]   REFERRING PHYSICIAN: Carito Kern MD  Pre-treatment Symptoms/Complaints: Pt states she had botox on Monday with Dr. Randi Power.  She says she has not been able to have a bowel movement for 4 days. Has tried numerous things such as a suppository, mirlax, enimas, without success. Is in moderate pelvic and vaginal pain. Pain: Initial:  7 /10 oelvis   Post Session:  6/10 pelvis        Medications Last Reviewed:  2019    Increased pain at left levator ani to signifcant  * indicates pain / 10     TREATMENT:   THERAPEUTIC EXERCISE: (0 minutes):  Exercises per grid below to improve strength and coordination. Required moderate verbal and tactile cues to promote proper body breathing techniques. Progressed repetitions and complexity of movement as indicated. MANUAL THERAPY: (45  minutes): Soft tissue mobilization was utilized and necessary because of the patient's painful spasm and restricted motion of soft tissue. (Used abbreviations: MET - muscle energy technique; SCS- Strain counter strain; CTM- Connective tissue mobilizations; CR- Contract/relax; SP- Sustained pressure, TrP- Trigger point release, IASTM- Instrument assisted soft tissue mobilizations, TDN- Trigger point dry needling).   :MODALITIES: (10 minutes): MODALITIES: (hb10 minutes): *  Hot Pack Therapy in order to provide analgesia and relieve muscle spasm. lower abdomen.  Skin intact       Gentle sinking, combining planes and tensioning into to abdomen to decrease tension at connective tissue and fascia   Gentle tension loading to scar of laparoscopy at abdomen (not performed)  CTM to bilateral adductors with rolling and strumming. Nerve flossing to pudendal n. (not performed)  SCS and Trigger point to bilateral deep transverse perineum and superficial transverse, levator ani and oI  perineum to reduce tone and improve  tissue elasticity  Bilateral lumbar distraction in prone (not performed)  CTM to sacrotuberous ligament, piriformis, and multifidus in prone (not performed)  Right psoas release. (not performed)  Cervical manipulation c1-2, and c 4-5 with consent in supine (not performed)  Thoracic maniupation in prone A-P in prone. (not performed)     Exercises:  Patient instructed in pelvic floor exercises listed below:    Date:  12/5/2019   Activity/Exercise     Prone extension    Multifidus     Left external rotation stretch     Adductor stetch    Cat cow    rena pose    Hip flexor stretch    rena pose hands to the right and left    PF drop in supine with biofeedback assist for visual feedback via external anal sensors      Lumbar hip distraction     Hip adduction     Dilator        [employer] Portal  The following educational topics were reviewed with patient:  None today. Treatment/Session Summary:   · Response to Treatment: mild pressure used through out the pelvic floor today as patient did report increased discomfort. Will procede gently as pt did just receive botox. · Equipment provided today:  None today  · Recommendations/Intent for next treatment session: Next visit will focus on increase dilator if possible.     Treatment Plan of Care Effective Dates: 10/18/2019 to 1/16/2020    Total Treatment Billable Duration:  53   minutes     PT Patient Time In/Time Out  Time In: 0900  Time Out: 1201 Isaac Hardy, PT    Future Appointments   Date Time Provider Jose Cruz Maier   12/13/2019 11:00 AM Ned Bullock, PT [facility]   12/19/2019  1:00 PM Ned Bullock, PT [facility]   12/31/2019 11:00 AM Ned Bullock, PT [facility]   2/13/2020 10:00 AM MD Josep Escobar

## 2019-12-12 ENCOUNTER — HOSPITAL ENCOUNTER (OUTPATIENT)
Dept: PHYSICAL THERAPY | Age: 34
Discharge: HOME | End: 2019-12-12
Payer: COMMERCIAL

## 2019-12-12 PROCEDURE — 97140 MANUAL THERAPY 1/> REGIONS: CPT

## 2019-12-12 NOTE — PROGRESS NOTES
Radha Rojas  : 1985  Primary: [facility] [place]  Secondary:  [address]  [facility] [place]  [address] [place] [address], [address] [place] [address] [address]  [phone]   [phone]       Claudetta Mulch  : 1985  Primary: [facility] [place]  Secondary:  [address]  [facility] [place]  [address] [facility], [facility] [address]  [phone]   [phone]       OUTPATIENT PHYSICAL THERAPY: Daily Treatment Note 2019  MEDICAL/REFERRING DIAGNOSIS:  Pelvic and perineal pain [R10.2]   REFERRING PHYSICIAN: Claus Epstein MD  Pre-treatment Symptoms/Complaints: Pt had a bowel movement the day after therapy. She took two Linzese which helped. Felt like this got to be more normal.  Consistnacy is still off but feels more normal.  The weather is affecting her pain level. Lower back seems to be more painful. Pain: Initial:  7 /10 pelvis  7.5 on the back Post Session:  6/10 pelvis        Medications Last Reviewed:  2019    Increased pain at left levator ani to signifcant  * indicates pain / 10     TREATMENT:   THERAPEUTIC EXERCISE: (0 minutes):  Exercises per grid below to improve strength and coordination. Required moderate verbal and tactile cues to promote proper body breathing techniques. Progressed repetitions and complexity of movement as indicated. MANUAL THERAPY: (55  minutes): Soft tissue mobilization was utilized and necessary because of the patient's painful spasm and restricted motion of soft tissue. (Used abbreviations: MET - muscle energy technique; SCS- Strain counter strain; CTM- Connective tissue mobilizations; CR- Contract/relax; SP- Sustained pressure, TrP- Trigger point release, IASTM- Instrument assisted soft tissue mobilizations, TDN- Trigger point dry needling).   :MODALITIES: (10 minutes): MODALITIES: (hb10 minutes): *  Hot Pack Therapy in order to provide analgesia and relieve muscle spasm.   lower abdomen. Skin intact       Gentle sinking, combining planes and tensioning into to abdomen to decrease tension at connective tissue and fascia   Gentle tension loading to scar of laparoscopy at abdomen   CTM to bilateral adductors with rolling and strumming. Nerve flossing to pudendal n. (not performed)  SCS and Trigger point to bilateral deep transverse perineum and superficial transverse, levator ani and oI  perineum to reduce tone and improve  tissue elasticity  Bilateral lumbar distraction in prone (not performed)  CTM to sacrotuberous ligament, piriformis, and multifidus in prone (not performed)  Right psoas release. (not performed)  Cervical manipulation c1-2, and c 4-5 with consent in supine (not performed)  Thoracic maniupation in prone A-P in prone. (not performed)     Exercises:  Patient instructed in pelvic floor exercises listed below:    Date:  12/12/2019   Activity/Exercise     Prone extension    Multifidus     Left external rotation stretch     Adductor stetch    Cat cow    rena pose    Hip flexor stretch    rena pose hands to the right and left    PF drop in supine with biofeedback assist for visual feedback via external anal sensors      Lumbar hip distraction     Hip adduction     Dilator        [employer] Portal  The following educational topics were reviewed with patient:  None today. Treatment/Session Summary:   · Response to Treatment: mild pressure at the vaginal canal reproduced minimal discomfort. Has made slight improvements since the Botox. Pt is now oprogressing slowly but steady. · Equipment provided today:  None today  · Recommendations/Intent for next treatment session: Next visit will focus on increase dilator if possible.     Treatment Plan of Care Effective Dates: 10/18/2019 to 1/16/2020    Total Treatment Billable Duration:  55   minutes     PT Patient Time In/Time Out  Time In: 0900  Time Out: [address], PT    Future Appointments   Date Time Provider Jose Cruz Maier   12/19/2019  1:00 PM Yoli Rviera PT Located within [facility]   12/31/2019 11:00 AM Yoli Rivera PT [facility]   2/13/2020 10:00 AM MD Laurita Park

## 2019-12-19 ENCOUNTER — APPOINTMENT (OUTPATIENT)
Dept: PHYSICAL THERAPY | Age: 34
End: 2019-12-19
Payer: COMMERCIAL

## 2019-12-31 ENCOUNTER — HOSPITAL ENCOUNTER (OUTPATIENT)
Dept: PHYSICAL THERAPY | Age: 34
End: 2019-12-31
Payer: COMMERCIAL

## 2021-02-24 ENCOUNTER — TRANSCRIBE ORDER (OUTPATIENT)
Dept: SCHEDULING | Age: 36
End: 2021-02-24

## 2021-02-24 DIAGNOSIS — Z12.31 SCREENING MAMMOGRAM FOR HIGH-RISK PATIENT: Primary | ICD-10-CM

## 2021-02-24 DIAGNOSIS — Z80.3 FAMILY HISTORY OF BREAST CANCER: Primary | ICD-10-CM

## 2021-03-18 PROBLEM — R41.3 SHORT-TERM MEMORY LOSS: Status: ACTIVE | Noted: 2021-03-18

## 2021-03-18 PROBLEM — S06.0X0A CONCUSSION WITHOUT LOSS OF CONSCIOUSNESS: Status: ACTIVE | Noted: 2021-03-18

## 2021-05-13 ENCOUNTER — HOSPITAL ENCOUNTER (OUTPATIENT)
Dept: MAMMOGRAPHY | Age: 36
Discharge: HOME | End: 2021-05-13
Attending: INTERNAL MEDICINE
Payer: COMMERCIAL

## 2021-05-13 DIAGNOSIS — Z12.31 SCREENING MAMMOGRAM FOR HIGH-RISK PATIENT: ICD-10-CM

## 2021-05-13 PROCEDURE — 77063 BREAST TOMOSYNTHESIS BI: CPT

## 2021-06-22 PROBLEM — F07.81 POST CONCUSSION SYNDROME: Status: ACTIVE | Noted: 2021-06-22

## 2021-06-22 PROBLEM — G44.311 INTRACTABLE ACUTE POST-TRAUMATIC HEADACHE: Status: ACTIVE | Noted: 2021-06-22

## 2021-07-30 PROBLEM — M54.2 NECK PAIN: Status: ACTIVE | Noted: 2021-07-30

## 2021-07-30 PROBLEM — M54.81 OCCIPITAL NEURALGIA OF RIGHT SIDE: Status: ACTIVE | Noted: 2021-07-30

## 2021-10-20 ENCOUNTER — HOSPITAL ENCOUNTER (OUTPATIENT)
Dept: LAB | Age: 36
Discharge: HOME | End: 2021-10-20

## 2021-10-20 PROCEDURE — 88312 SPECIAL STAINS GROUP 1: CPT

## 2021-10-20 PROCEDURE — 88305 TISSUE EXAM BY PATHOLOGIST: CPT

## 2022-03-18 PROBLEM — S06.0X0A CONCUSSION WITHOUT LOSS OF CONSCIOUSNESS: Status: ACTIVE | Noted: 2021-03-18

## 2022-03-18 PROBLEM — M54.2 NECK PAIN: Status: ACTIVE | Noted: 2021-07-30

## 2022-03-19 PROBLEM — N80.9 ENDOMETRIOSIS DETERMINED BY LAPAROSCOPY: Status: ACTIVE | Noted: 2018-04-16

## 2022-03-19 PROBLEM — F07.81 POST CONCUSSION SYNDROME: Status: ACTIVE | Noted: 2021-06-22

## 2022-03-19 PROBLEM — R41.3 SHORT-TERM MEMORY LOSS: Status: ACTIVE | Noted: 2021-03-18

## 2022-03-19 PROBLEM — M54.81 OCCIPITAL NEURALGIA OF RIGHT SIDE: Status: ACTIVE | Noted: 2021-07-30

## 2022-03-19 PROBLEM — G44.311 INTRACTABLE ACUTE POST-TRAUMATIC HEADACHE: Status: ACTIVE | Noted: 2021-06-22

## 2022-03-19 PROBLEM — R10.2 VAGINAL PAIN: Status: ACTIVE | Noted: 2018-09-26

## 2022-03-19 PROBLEM — R39.15 URINARY URGENCY: Status: ACTIVE | Noted: 2018-09-26

## 2022-03-20 PROBLEM — N94.89 HIGH-TONE PELVIC FLOOR DYSFUNCTION: Status: ACTIVE | Noted: 2018-09-26

## 2022-03-20 PROBLEM — M62.89 HIGH-TONE PELVIC FLOOR DYSFUNCTION: Status: ACTIVE | Noted: 2018-09-26

## 2022-05-31 ENCOUNTER — TELEPHONE (OUTPATIENT)
Dept: NEUROLOGY | Age: 37
End: 2022-05-31

## 2022-06-21 ENCOUNTER — TELEPHONE (OUTPATIENT)
Dept: NEUROLOGY | Age: 37
End: 2022-06-21

## 2023-01-25 ENCOUNTER — HOSPITAL ENCOUNTER (OUTPATIENT)
Dept: MAMMOGRAPHY | Age: 38
Discharge: HOME | End: 2023-01-28
Payer: COMMERCIAL

## 2023-01-25 DIAGNOSIS — Z12.31 ENCOUNTER FOR SCREENING MAMMOGRAM FOR BREAST CANCER: ICD-10-CM

## 2023-01-25 PROCEDURE — 77063 BREAST TOMOSYNTHESIS BI: CPT

## 2023-02-06 ENCOUNTER — HOSPITAL ENCOUNTER (OUTPATIENT)
Dept: MAMMOGRAPHY | Age: 38
Discharge: HOME | End: 2023-02-09
Payer: COMMERCIAL

## 2023-02-06 DIAGNOSIS — R92.8 ABNORMAL SCREENING MAMMOGRAM: ICD-10-CM

## 2023-02-06 PROCEDURE — 77065 DX MAMMO INCL CAD UNI: CPT

## 2023-07-25 ENCOUNTER — TELEPHONE (OUTPATIENT)
Dept: NEUROLOGY | Age: 38
End: 2023-07-25

## 2023-12-05 ENCOUNTER — INITIAL CONSULT (OUTPATIENT)
Age: 38
End: 2023-12-05
Payer: COMMERCIAL

## 2023-12-05 VITALS
WEIGHT: 136 LBS | HEIGHT: 69 IN | DIASTOLIC BLOOD PRESSURE: 68 MMHG | SYSTOLIC BLOOD PRESSURE: 118 MMHG | BODY MASS INDEX: 20.14 KG/M2 | HEART RATE: 78 BPM

## 2023-12-05 DIAGNOSIS — I47.10 SVT (SUPRAVENTRICULAR TACHYCARDIA): Primary | ICD-10-CM

## 2023-12-05 DIAGNOSIS — Z76.89 ENCOUNTER TO ESTABLISH CARE: ICD-10-CM

## 2023-12-05 DIAGNOSIS — Q79.60 EHLERS-DANLOS DISEASE: ICD-10-CM

## 2023-12-05 PROCEDURE — 93000 ELECTROCARDIOGRAM COMPLETE: CPT | Performed by: INTERNAL MEDICINE

## 2023-12-05 PROCEDURE — 99244 OFF/OP CNSLTJ NEW/EST MOD 40: CPT | Performed by: INTERNAL MEDICINE

## 2023-12-05 RX ORDER — METOPROLOL SUCCINATE 25 MG/1
25 TABLET, EXTENDED RELEASE ORAL DAILY
Qty: 30 TABLET | Refills: 11 | Status: SHIPPED | OUTPATIENT
Start: 2023-12-05

## 2023-12-05 ASSESSMENT — ENCOUNTER SYMPTOMS
COUGH: 0
VOMITING: 0
NAUSEA: 1
SUSPICIOUS LESIONS: 0
SORE THROAT: 0
SHORTNESS OF BREATH: 0
DIARRHEA: 1

## 2023-12-05 NOTE — PROGRESS NOTES
PCP office  - Onset of palpitations coincides with increased IZAGUIRRE and fatigue. Will trial metoprolol succinate 25mg daily for symptomatic improvement. Next steps in mgmt pending response. - Obtain baseline TTE.  - ECG today unremarkable. -     Echo (TTE) complete (PRN contrast/bubble/strain/3D); Future  -     metoprolol succinate (TOPROL XL) 25 MG extended release tablet; Take 1 tablet by mouth daily, Disp-30 tablet, R-11Normal  Encounter to establish care  -     EKG 12 lead  Janell-Danlos disease  - Previously diagnosed with EDS. Has never had echo. Will obtain baseline TTE as above. -     Echo (TTE) complete (PRN contrast/bubble/strain/3D); Future       Return in about 4 weeks (around 1/2/2024) for Routine follow up. Thank you for allowing me to participate in this patient's care. Please call or contact me if there are any questions or concerns regarding the above.       Era Sesay,   12/05/23  2:38 PM      Proofread, but unrecognized errors may exist.

## 2024-01-16 ENCOUNTER — OFFICE VISIT (OUTPATIENT)
Age: 39
End: 2024-01-16
Payer: COMMERCIAL

## 2024-01-16 VITALS
HEART RATE: 68 BPM | DIASTOLIC BLOOD PRESSURE: 80 MMHG | SYSTOLIC BLOOD PRESSURE: 138 MMHG | BODY MASS INDEX: 19.7 KG/M2 | WEIGHT: 133 LBS | HEIGHT: 69 IN

## 2024-01-16 DIAGNOSIS — Q79.60 EHLERS-DANLOS SYNDROME: Chronic | ICD-10-CM

## 2024-01-16 DIAGNOSIS — I47.10 SVT (SUPRAVENTRICULAR TACHYCARDIA): ICD-10-CM

## 2024-01-16 DIAGNOSIS — I49.3 PVC (PREMATURE VENTRICULAR CONTRACTION): ICD-10-CM

## 2024-01-16 DIAGNOSIS — R00.2 PALPITATIONS: Primary | ICD-10-CM

## 2024-01-16 PROCEDURE — 99214 OFFICE O/P EST MOD 30 MIN: CPT | Performed by: INTERNAL MEDICINE

## 2024-01-16 NOTE — PROGRESS NOTES
[facility] [place]  [address] [place] [address], [address]  [place] [address]  PHONE: [phone]      24    NAME:  Em Arredondo  : 1985  MRN: [id number]         SUBJECTIVE:   Em Arredondo is a 38 y.o. female seen for a follow up visit regarding the following:     Chief Complaint   Patient presents with    Results     echo    supraventricular tachycardia            HPI:  Follow up  Results (echo) and supraventricular tachycardia   .    38 y.o. female with PMH Janell Danlos syndrome, POTS presenting for follow up evaluation of palpitations. Patient continues to have palpitations, chest tightness and IZAGUIRRE as previously described. No syncope. She was reluctant to start on medication over concern that her heart rate or blood pressure may drop significantly.         Key CAD CHF Meds            metoprolol succinate (TOPROL XL) 25 MG extended release tablet    Sig - Route: Take 1 tablet by mouth daily - Oral    Patient not taking: Reported on 2024          Key Antihyperglycemic Medications       Patient is on no antihyperglycemic meds.                Past Medical History, Past Surgical History, Family history, Social History, and Medications were all reviewed with the patient today and updated as necessary.     Prior to Admission medications    Medication Sig Start Date End Date Taking? Authorizing Provider   Cholecalciferol 50 MCG (2000) TABS Take by mouth   Yes Automatic Reconciliation, Ar   clonazePAM (KLONOPIN) 0.5 MG tablet Take by mouth as needed for Anxiety.   Yes Automatic Reconciliation, Ar   loratadine (CLARITIN) 10 MG tablet Take 1 tablet by mouth every evening   Yes Automatic Reconciliation, Ar   magnesium oxide (MAG-OX) 400 (240 Mg) MG tablet Take 1 tablet by mouth daily   Yes Automatic Reconciliation, Ar   Melatonin 5 MG CAPS Take 5 mg by mouth   Yes Automatic Reconciliation, Ar   pantoprazole (PROTONIX) 20 MG tablet Take 1 tablet by mouth daily 21  Yes Automatic

## 2024-04-02 ENCOUNTER — TRANSCRIBE ORDERS (OUTPATIENT)
Dept: SCHEDULING | Age: 39
End: 2024-04-02

## 2024-04-02 DIAGNOSIS — Z12.31 SCREENING MAMMOGRAM FOR HIGH-RISK PATIENT: Primary | ICD-10-CM

## 2024-05-20 ENCOUNTER — HOSPITAL ENCOUNTER (OUTPATIENT)
Dept: MAMMOGRAPHY | Age: 39
Discharge: HOME | End: 2024-05-23
Payer: COMMERCIAL

## 2024-05-20 DIAGNOSIS — Z12.31 SCREENING MAMMOGRAM FOR HIGH-RISK PATIENT: ICD-10-CM

## 2024-05-20 PROCEDURE — 77063 BREAST TOMOSYNTHESIS BI: CPT

## 2025-04-18 ENCOUNTER — TRANSCRIBE ORDERS (OUTPATIENT)
Dept: SCHEDULING | Age: 40
End: 2025-04-18

## 2025-04-18 DIAGNOSIS — Z12.31 ENCOUNTER FOR SCREENING MAMMOGRAM FOR MALIGNANT NEOPLASM OF BREAST: Primary | ICD-10-CM

## 2025-07-24 ENCOUNTER — HOSPITAL ENCOUNTER (OUTPATIENT)
Dept: MAMMOGRAPHY | Age: 40
Discharge: HOME | End: 2025-07-27
Payer: COMMERCIAL

## 2025-07-24 DIAGNOSIS — Z12.31 ENCOUNTER FOR SCREENING MAMMOGRAM FOR MALIGNANT NEOPLASM OF BREAST: ICD-10-CM

## 2025-07-24 PROCEDURE — 77067 SCR MAMMO BI INCL CAD: CPT

## 2025-08-27 ENCOUNTER — TRANSCRIBE ORDERS (OUTPATIENT)
Dept: SCHEDULING | Age: 40
End: 2025-08-27

## 2025-08-27 DIAGNOSIS — Z12.31 ENCOUNTER FOR SCREENING MAMMOGRAM FOR HIGH-RISK PATIENT: Primary | ICD-10-CM

## (undated) DEVICE — SYRINGE CATH TIP 50ML

## (undated) DEVICE — CONTROL SYRINGE LUER-LOCK TIP: Brand: MONOJECT

## (undated) DEVICE — CYSTO/BLADDER IRRIGATION SET, REGULATING CLAMP

## (undated) DEVICE — KENDALL SCD EXPRESS SLEEVES, KNEE LENGTH, MEDIUM: Brand: KENDALL SCD

## (undated) DEVICE — DRAPE,LITHOTOMY,STERILE: Brand: MEDLINE

## (undated) DEVICE — SYR 5ML 1/5 GRAD LL NSAF LF --

## (undated) DEVICE — 1840 FOAM BLOCK NEEDLE COUNTER: Brand: DEVON

## (undated) DEVICE — 2000CC GUARDIAN II: Brand: GUARDIAN

## (undated) DEVICE — PLASTIC ADHESIVE BANDAGE: Brand: CURITY

## (undated) DEVICE — CARDINAL HEALTH FLEXIBLE LIGHT HANDLE COVER: Brand: CARDINAL HEALTH

## (undated) DEVICE — NDL SPNE QNCKE 22GX5IN LF BLK --

## (undated) DEVICE — SOLUTION IRRIGATION H2O 0797305] ICU MEDICAL INC]

## (undated) DEVICE — CYSTO: Brand: MEDLINE INDUSTRIES, INC.

## (undated) DEVICE — NON-DEHP T-CONNECTOR EXTENSION SET, T HOUSING, ROTATING T-CONNECTOR: Brand: INTERLINK

## (undated) DEVICE — SOLUTION IV 1000ML 0.9% SOD CHL

## (undated) DEVICE — SYR 3ML LL TIP 1/10ML GRAD --

## (undated) DEVICE — SLIM BODY SKIN STAPLER: Brand: APPOSE ULC

## (undated) DEVICE — SYR 10ML LUER LOK 1/5ML GRAD --

## (undated) DEVICE — CATH URETH INTMIT 16FRX16IN --

## (undated) DEVICE — Device

## (undated) DEVICE — DRAPE TWL SURG 16X26IN BLU ORB04] ALLCARE INC]

## (undated) DEVICE — CATHETER URETH 16FR PVC 2 W F

## (undated) DEVICE — TRAY PREP DRY W/ PREM GLV 2 APPL 6 SPNG 2 UNDPD 1 OVERWRAP

## (undated) DEVICE — NEEDLE SPNL 22GA L3.5IN BLK HUB S STL REG WALL FIT STYL W/